# Patient Record
Sex: FEMALE | Race: WHITE | Employment: UNEMPLOYED | URBAN - METROPOLITAN AREA
[De-identification: names, ages, dates, MRNs, and addresses within clinical notes are randomized per-mention and may not be internally consistent; named-entity substitution may affect disease eponyms.]

---

## 2017-07-13 PROBLEM — M17.12 PRIMARY OSTEOARTHRITIS OF LEFT KNEE: Status: ACTIVE | Noted: 2017-07-13

## 2017-09-15 PROBLEM — S83.272A COMPLEX TEAR OF LATERAL MENISCUS OF LEFT KNEE AS CURRENT INJURY: Status: ACTIVE | Noted: 2017-09-15

## 2017-09-15 PROBLEM — M65.9 SYNOVITIS: Status: ACTIVE | Noted: 2017-09-15

## 2018-04-19 ENCOUNTER — OFFICE VISIT (OUTPATIENT)
Dept: ORTHOPEDIC SURGERY | Age: 43
End: 2018-04-19
Payer: COMMERCIAL

## 2018-04-19 VITALS — BODY MASS INDEX: 41.44 KG/M2 | WEIGHT: 264 LBS | HEIGHT: 67 IN | TEMPERATURE: 98 F

## 2018-04-19 DIAGNOSIS — M17.0 PRIMARY OSTEOARTHRITIS OF BOTH KNEES: Primary | ICD-10-CM

## 2018-04-19 PROCEDURE — 4004F PT TOBACCO SCREEN RCVD TLK: CPT | Performed by: ORTHOPAEDIC SURGERY

## 2018-04-19 PROCEDURE — G8427 DOCREV CUR MEDS BY ELIG CLIN: HCPCS | Performed by: ORTHOPAEDIC SURGERY

## 2018-04-19 PROCEDURE — G8417 CALC BMI ABV UP PARAM F/U: HCPCS | Performed by: ORTHOPAEDIC SURGERY

## 2018-04-19 PROCEDURE — 99213 OFFICE O/P EST LOW 20 MIN: CPT | Performed by: ORTHOPAEDIC SURGERY

## 2018-05-22 ENCOUNTER — OFFICE VISIT (OUTPATIENT)
Dept: ORTHOPEDIC SURGERY | Age: 43
End: 2018-05-22
Payer: COMMERCIAL

## 2018-05-22 DIAGNOSIS — M17.0 PRIMARY OSTEOARTHRITIS OF BOTH KNEES: Primary | ICD-10-CM

## 2018-05-22 PROCEDURE — 20610 DRAIN/INJ JOINT/BURSA W/O US: CPT | Performed by: ORTHOPAEDIC SURGERY

## 2018-07-09 ENCOUNTER — APPOINTMENT (OUTPATIENT)
Dept: GENERAL RADIOLOGY | Age: 43
End: 2018-07-09
Payer: COMMERCIAL

## 2018-07-09 ENCOUNTER — HOSPITAL ENCOUNTER (EMERGENCY)
Age: 43
Discharge: ANOTHER HOSPITAL | End: 2018-07-09
Attending: EMERGENCY MEDICINE
Payer: COMMERCIAL

## 2018-07-09 ENCOUNTER — APPOINTMENT (OUTPATIENT)
Dept: CT IMAGING | Age: 43
End: 2018-07-09
Payer: COMMERCIAL

## 2018-07-09 ENCOUNTER — HOSPITAL ENCOUNTER (OUTPATIENT)
Age: 43
Discharge: HOME | End: 2018-07-09
Payer: COMMERCIAL

## 2018-07-09 ENCOUNTER — APPOINTMENT (OUTPATIENT)
Dept: MRI IMAGING | Age: 43
DRG: 045 | End: 2018-07-09
Payer: COMMERCIAL

## 2018-07-09 ENCOUNTER — APPOINTMENT (OUTPATIENT)
Dept: CT IMAGING | Age: 43
DRG: 045 | End: 2018-07-09
Payer: COMMERCIAL

## 2018-07-09 ENCOUNTER — HOSPITAL ENCOUNTER (INPATIENT)
Age: 43
LOS: 1 days | Discharge: HOME | DRG: 045 | End: 2018-07-10
Attending: EMERGENCY MEDICINE | Admitting: HOSPITALIST
Payer: COMMERCIAL

## 2018-07-09 VITALS
OXYGEN SATURATION: 97 % | HEART RATE: 75 BPM | WEIGHT: 252.2 LBS | BODY MASS INDEX: 39.5 KG/M2 | TEMPERATURE: 97.9 F | SYSTOLIC BLOOD PRESSURE: 158 MMHG | DIASTOLIC BLOOD PRESSURE: 85 MMHG | RESPIRATION RATE: 14 BRPM

## 2018-07-09 DIAGNOSIS — R07.9 CHEST PAIN, UNSPECIFIED TYPE: Primary | ICD-10-CM

## 2018-07-09 DIAGNOSIS — R53.1 LEFT-SIDED WEAKNESS: ICD-10-CM

## 2018-07-09 DIAGNOSIS — I16.0 HYPERTENSIVE URGENCY: ICD-10-CM

## 2018-07-09 DIAGNOSIS — I63.9 CEREBROVASCULAR ACCIDENT (CVA), UNSPECIFIED MECHANISM (HCC): Primary | ICD-10-CM

## 2018-07-09 DIAGNOSIS — R52 PAIN: ICD-10-CM

## 2018-07-09 DIAGNOSIS — I63.9 CEREBROVASCULAR ACCIDENT (CVA), UNSPECIFIED MECHANISM (HCC): ICD-10-CM

## 2018-07-09 DIAGNOSIS — R07.9 CHEST PAIN, UNSPECIFIED TYPE: ICD-10-CM

## 2018-07-09 PROBLEM — F17.200 TOBACCO DEPENDENCY: Chronic | Status: ACTIVE | Noted: 2018-07-09

## 2018-07-09 PROBLEM — R29.90 STROKE-LIKE SYMPTOMS: Status: ACTIVE | Noted: 2018-07-09

## 2018-07-09 PROBLEM — E66.9 CLASS 2 OBESITY: Chronic | Status: ACTIVE | Noted: 2018-07-09

## 2018-07-09 PROBLEM — F32.A DEPRESSION: Chronic | Status: ACTIVE | Noted: 2018-07-09

## 2018-07-09 LAB
ACETAMINOPHEN LEVEL: <5 MCG/ML (ref 10–30)
ALBUMIN SERPL-MCNC: 3.9 G/DL (ref 3.5–5.2)
ALP BLD-CCNC: 83 U/L (ref 35–104)
ALT SERPL-CCNC: 10 U/L (ref 0–32)
AMPHETAMINE SCREEN, URINE: NOT DETECTED
AMYLASE: 30 U/L (ref 20–100)
ANION GAP SERPL CALCULATED.3IONS-SCNC: 12 MMOL/L (ref 7–16)
APTT: 29.5 SEC (ref 24.5–35.1)
AST SERPL-CCNC: 12 U/L (ref 0–31)
BACTERIA: ABNORMAL /HPF
BARBITURATE SCREEN URINE: NOT DETECTED
BENZODIAZEPINE SCREEN, URINE: NOT DETECTED
BILIRUB SERPL-MCNC: 0.2 MG/DL (ref 0–1.2)
BILIRUBIN DIRECT: <0.2 MG/DL (ref 0–0.3)
BILIRUBIN URINE: NEGATIVE
BILIRUBIN, INDIRECT: NORMAL MG/DL (ref 0–1)
BLOOD, URINE: NEGATIVE
BUN BLDV-MCNC: 7 MG/DL (ref 6–20)
CALCIUM SERPL-MCNC: 9 MG/DL (ref 8.6–10.2)
CANNABINOID SCREEN URINE: NOT DETECTED
CHLORIDE BLD-SCNC: 101 MMOL/L (ref 98–107)
CHOLESTEROL, FASTING: 201 MG/DL (ref 0–199)
CHP ED QC CHECK: YES
CK MB: 1.3 NG/ML (ref 0–4.3)
CLARITY: CLEAR
CO2: 27 MMOL/L (ref 22–29)
COCAINE METABOLITE SCREEN URINE: NOT DETECTED
COLOR: YELLOW
CREAT SERPL-MCNC: 0.7 MG/DL (ref 0.5–1)
D DIMER: <200 NG/ML DDU
EKG ATRIAL RATE: 66 BPM
EKG P AXIS: 43 DEGREES
EKG P-R INTERVAL: 146 MS
EKG Q-T INTERVAL: 412 MS
EKG QRS DURATION: 98 MS
EKG QTC CALCULATION (BAZETT): 431 MS
EKG R AXIS: 31 DEGREES
EKG T AXIS: 22 DEGREES
EKG VENTRICULAR RATE: 66 BPM
EPITHELIAL CELLS, UA: ABNORMAL /HPF
ETHANOL: <10 MG/DL (ref 0–0.08)
GFR AFRICAN AMERICAN: >60
GFR NON-AFRICAN AMERICAN: >60 ML/MIN/1.73
GLUCOSE BLD-MCNC: 83 MG/DL (ref 74–109)
GLUCOSE URINE: NEGATIVE MG/DL
HBA1C MFR BLD: 5.5 % (ref 4–5.6)
HCT VFR BLD CALC: 33.6 % (ref 34–48)
HDLC SERPL-MCNC: 34 MG/DL
HEMOGLOBIN: 10.1 G/DL (ref 11.5–15.5)
INR BLD: 1.1
KETONES, URINE: NEGATIVE MG/DL
LDL CHOLESTEROL CALCULATED: 126 MG/DL (ref 0–99)
LEUKOCYTE ESTERASE, URINE: ABNORMAL
LIPASE: 31 U/L (ref 13–60)
MAGNESIUM: 2.2 MG/DL (ref 1.6–2.6)
MCH RBC QN AUTO: 22 PG (ref 26–35)
MCHC RBC AUTO-ENTMCNC: 30.1 % (ref 32–34.5)
MCV RBC AUTO: 73 FL (ref 80–99.9)
METER GLUCOSE: 87 MG/DL (ref 70–110)
METER GLUCOSE: 93 MG/DL (ref 70–110)
METHADONE SCREEN, URINE: NOT DETECTED
NITRITE, URINE: NEGATIVE
OPIATE SCREEN URINE: NOT DETECTED
PDW BLD-RTO: 18.7 FL (ref 11.5–15)
PH UA: 6 (ref 5–9)
PHENCYCLIDINE SCREEN URINE: NOT DETECTED
PLATELET # BLD: 477 E9/L (ref 130–450)
PMV BLD AUTO: 9.6 FL (ref 7–12)
POTASSIUM SERPL-SCNC: 3.4 MMOL/L (ref 3.5–5)
PREGNANCY TEST URINE, POC: NORMAL
PRO-BNP: 243 PG/ML (ref 0–125)
PROPOXYPHENE SCREEN: NOT DETECTED
PROTEIN UA: NEGATIVE MG/DL
PROTHROMBIN TIME: 12.3 SEC (ref 9.3–12.4)
RBC # BLD: 4.6 E12/L (ref 3.5–5.5)
RBC UA: ABNORMAL /HPF (ref 0–2)
SALICYLATE, SERUM: <0.3 MG/DL (ref 0–30)
SODIUM BLD-SCNC: 140 MMOL/L (ref 132–146)
SPECIFIC GRAVITY UA: <=1.005 (ref 1–1.03)
TOTAL CK: 47 U/L (ref 20–180)
TOTAL PROTEIN: 6.8 G/DL (ref 6.4–8.3)
TRICYCLIC ANTIDEPRESSANTS SCREEN SERUM: NEGATIVE NG/ML
TRIGLYCERIDE, FASTING: 204 MG/DL (ref 0–149)
TROPONIN: <0.01 NG/ML (ref 0–0.03)
TROPONIN: <0.01 NG/ML (ref 0–0.03)
UROBILINOGEN, URINE: 0.2 E.U./DL
VLDLC SERPL CALC-MCNC: 41 MG/DL
WBC # BLD: 9.3 E9/L (ref 4.5–11.5)
WBC UA: ABNORMAL /HPF (ref 0–5)

## 2018-07-09 PROCEDURE — A0426 ALS 1: HCPCS

## 2018-07-09 PROCEDURE — 70498 CT ANGIOGRAPHY NECK: CPT

## 2018-07-09 PROCEDURE — 82150 ASSAY OF AMYLASE: CPT

## 2018-07-09 PROCEDURE — G8988 SELF CARE GOAL STATUS: HCPCS

## 2018-07-09 PROCEDURE — 70450 CT HEAD/BRAIN W/O DYE: CPT

## 2018-07-09 PROCEDURE — 2700000000 HC OXYGEN THERAPY PER DAY

## 2018-07-09 PROCEDURE — 85610 PROTHROMBIN TIME: CPT

## 2018-07-09 PROCEDURE — G8978 MOBILITY CURRENT STATUS: HCPCS

## 2018-07-09 PROCEDURE — 36415 COLL VENOUS BLD VENIPUNCTURE: CPT

## 2018-07-09 PROCEDURE — 82553 CREATINE MB FRACTION: CPT

## 2018-07-09 PROCEDURE — 80048 BASIC METABOLIC PNL TOTAL CA: CPT

## 2018-07-09 PROCEDURE — 85027 COMPLETE CBC AUTOMATED: CPT

## 2018-07-09 PROCEDURE — 97165 OT EVAL LOW COMPLEX 30 MIN: CPT

## 2018-07-09 PROCEDURE — 6370000000 HC RX 637 (ALT 250 FOR IP): Performed by: EMERGENCY MEDICINE

## 2018-07-09 PROCEDURE — 99285 EMERGENCY DEPT VISIT HI MDM: CPT

## 2018-07-09 PROCEDURE — 92523 SPEECH SOUND LANG COMPREHEN: CPT

## 2018-07-09 PROCEDURE — A0425 GROUND MILEAGE: HCPCS

## 2018-07-09 PROCEDURE — 71045 X-RAY EXAM CHEST 1 VIEW: CPT

## 2018-07-09 PROCEDURE — 81001 URINALYSIS AUTO W/SCOPE: CPT

## 2018-07-09 PROCEDURE — 6360000002 HC RX W HCPCS

## 2018-07-09 PROCEDURE — 0042T CT BRAIN PERFUSION: CPT

## 2018-07-09 PROCEDURE — 83880 ASSAY OF NATRIURETIC PEPTIDE: CPT

## 2018-07-09 PROCEDURE — G8989 SELF CARE D/C STATUS: HCPCS

## 2018-07-09 PROCEDURE — G8979 MOBILITY GOAL STATUS: HCPCS

## 2018-07-09 PROCEDURE — 6360000004 HC RX CONTRAST MEDICATION: Performed by: EMERGENCY MEDICINE

## 2018-07-09 PROCEDURE — 80307 DRUG TEST PRSMV CHEM ANLYZR: CPT

## 2018-07-09 PROCEDURE — G0480 DRUG TEST DEF 1-7 CLASSES: HCPCS

## 2018-07-09 PROCEDURE — 93005 ELECTROCARDIOGRAM TRACING: CPT | Performed by: EMERGENCY MEDICINE

## 2018-07-09 PROCEDURE — 84484 ASSAY OF TROPONIN QUANT: CPT

## 2018-07-09 PROCEDURE — 70496 CT ANGIOGRAPHY HEAD: CPT

## 2018-07-09 PROCEDURE — 97161 PT EVAL LOW COMPLEX 20 MIN: CPT

## 2018-07-09 PROCEDURE — 82550 ASSAY OF CK (CPK): CPT

## 2018-07-09 PROCEDURE — 85378 FIBRIN DEGRADE SEMIQUANT: CPT

## 2018-07-09 PROCEDURE — 83690 ASSAY OF LIPASE: CPT

## 2018-07-09 PROCEDURE — 83735 ASSAY OF MAGNESIUM: CPT

## 2018-07-09 PROCEDURE — 82962 GLUCOSE BLOOD TEST: CPT

## 2018-07-09 PROCEDURE — 80076 HEPATIC FUNCTION PANEL: CPT

## 2018-07-09 PROCEDURE — 83036 HEMOGLOBIN GLYCOSYLATED A1C: CPT

## 2018-07-09 PROCEDURE — 6370000000 HC RX 637 (ALT 250 FOR IP): Performed by: HOSPITALIST

## 2018-07-09 PROCEDURE — 2580000003 HC RX 258: Performed by: HOSPITALIST

## 2018-07-09 PROCEDURE — 80061 LIPID PANEL: CPT

## 2018-07-09 PROCEDURE — 85730 THROMBOPLASTIN TIME PARTIAL: CPT

## 2018-07-09 PROCEDURE — 2060000000 HC ICU INTERMEDIATE R&B

## 2018-07-09 PROCEDURE — G8987 SELF CARE CURRENT STATUS: HCPCS

## 2018-07-09 RX ORDER — HYDRALAZINE HYDROCHLORIDE 20 MG/ML
INJECTION INTRAMUSCULAR; INTRAVENOUS
Status: COMPLETED
Start: 2018-07-09 | End: 2018-07-09

## 2018-07-09 RX ORDER — SODIUM CHLORIDE 0.9 % (FLUSH) 0.9 %
10 SYRINGE (ML) INJECTION PRN
Status: DISCONTINUED | OUTPATIENT
Start: 2018-07-09 | End: 2018-07-10 | Stop reason: HOSPADM

## 2018-07-09 RX ORDER — SERTRALINE HYDROCHLORIDE 100 MG/1
100 TABLET, FILM COATED ORAL 2 TIMES DAILY
Status: DISCONTINUED | OUTPATIENT
Start: 2018-07-09 | End: 2018-07-10 | Stop reason: HOSPADM

## 2018-07-09 RX ORDER — ARIPIPRAZOLE 15 MG/1
15 TABLET ORAL DAILY
Status: DISCONTINUED | OUTPATIENT
Start: 2018-07-09 | End: 2018-07-10 | Stop reason: HOSPADM

## 2018-07-09 RX ORDER — HYDROXYZINE PAMOATE 25 MG/1
25 CAPSULE ORAL 3 TIMES DAILY
Status: DISCONTINUED | OUTPATIENT
Start: 2018-07-09 | End: 2018-07-10 | Stop reason: HOSPADM

## 2018-07-09 RX ORDER — ATORVASTATIN CALCIUM 40 MG/1
80 TABLET, FILM COATED ORAL NIGHTLY
Status: DISCONTINUED | OUTPATIENT
Start: 2018-07-09 | End: 2018-07-10 | Stop reason: HOSPADM

## 2018-07-09 RX ORDER — ASPIRIN 81 MG/1
324 TABLET, CHEWABLE ORAL ONCE
Status: COMPLETED | OUTPATIENT
Start: 2018-07-09 | End: 2018-07-09

## 2018-07-09 RX ORDER — LORAZEPAM 1 MG/1
0.5 TABLET ORAL NIGHTLY
Status: DISCONTINUED | OUTPATIENT
Start: 2018-07-09 | End: 2018-07-10 | Stop reason: HOSPADM

## 2018-07-09 RX ORDER — POTASSIUM CHLORIDE 20 MEQ/1
40 TABLET, EXTENDED RELEASE ORAL ONCE
Status: DISCONTINUED | OUTPATIENT
Start: 2018-07-09 | End: 2018-07-09 | Stop reason: HOSPADM

## 2018-07-09 RX ORDER — SODIUM CHLORIDE 0.9 % (FLUSH) 0.9 %
10 SYRINGE (ML) INJECTION EVERY 12 HOURS SCHEDULED
Status: DISCONTINUED | OUTPATIENT
Start: 2018-07-09 | End: 2018-07-10 | Stop reason: HOSPADM

## 2018-07-09 RX ORDER — ONDANSETRON 2 MG/ML
4 INJECTION INTRAMUSCULAR; INTRAVENOUS EVERY 6 HOURS PRN
Status: DISCONTINUED | OUTPATIENT
Start: 2018-07-09 | End: 2018-07-10 | Stop reason: HOSPADM

## 2018-07-09 RX ADMIN — HYDROXYZINE PAMOATE 25 MG: 25 CAPSULE ORAL at 22:13

## 2018-07-09 RX ADMIN — LORAZEPAM 0.5 MG: 1 TABLET ORAL at 22:13

## 2018-07-09 RX ADMIN — IOPAMIDOL 100 ML: 755 INJECTION, SOLUTION INTRAVENOUS at 04:01

## 2018-07-09 RX ADMIN — ATORVASTATIN CALCIUM 80 MG: 40 TABLET, FILM COATED ORAL at 22:13

## 2018-07-09 RX ADMIN — METOPROLOL TARTRATE 25 MG: 25 TABLET ORAL at 22:13

## 2018-07-09 RX ADMIN — NITROGLYCERIN 0.5 INCH: 20 OINTMENT TOPICAL at 04:08

## 2018-07-09 RX ADMIN — HYDROXYZINE PAMOATE 25 MG: 25 CAPSULE ORAL at 14:11

## 2018-07-09 RX ADMIN — Medication 10 ML: at 22:14

## 2018-07-09 RX ADMIN — HYDRALAZINE HYDROCHLORIDE 20 MG: 20 INJECTION INTRAMUSCULAR; INTRAVENOUS at 01:53

## 2018-07-09 RX ADMIN — ASPIRIN 81 MG CHEWABLE TABLET 324 MG: 81 TABLET CHEWABLE at 04:11

## 2018-07-09 RX ADMIN — SERTRALINE 100 MG: 100 TABLET, FILM COATED ORAL at 23:22

## 2018-07-09 ASSESSMENT — PAIN DESCRIPTION - FREQUENCY
FREQUENCY: CONTINUOUS
FREQUENCY: CONTINUOUS

## 2018-07-09 ASSESSMENT — ENCOUNTER SYMPTOMS
TROUBLE SWALLOWING: 0
VOMITING: 0
NAUSEA: 0

## 2018-07-09 ASSESSMENT — PAIN SCALES - GENERAL
PAINLEVEL_OUTOF10: 0
PAINLEVEL_OUTOF10: 0
PAINLEVEL_OUTOF10: 10
PAINLEVEL_OUTOF10: 0
PAINLEVEL_OUTOF10: 10

## 2018-07-09 ASSESSMENT — PAIN DESCRIPTION - PAIN TYPE: TYPE: ACUTE PAIN

## 2018-07-09 ASSESSMENT — PAIN DESCRIPTION - ONSET
ONSET: SUDDEN
ONSET: SUDDEN

## 2018-07-09 ASSESSMENT — PAIN DESCRIPTION - DESCRIPTORS
DESCRIPTORS: HEAVINESS
DESCRIPTORS: HEAVINESS

## 2018-07-09 ASSESSMENT — PAIN DESCRIPTION - LOCATION
LOCATION: CHEST
LOCATION: CHEST

## 2018-07-09 ASSESSMENT — PAIN DESCRIPTION - ORIENTATION
ORIENTATION: LEFT
ORIENTATION: LEFT

## 2018-07-09 ASSESSMENT — PAIN DESCRIPTION - PROGRESSION: CLINICAL_PROGRESSION: GRADUALLY WORSENING

## 2018-07-09 NOTE — ED NOTES
Bed: 16  Expected date:   Expected time:   Means of arrival:   Comments:  Lizzy [address] [facility]  07/09/18 9145

## 2018-07-09 NOTE — PROGRESS NOTES
SPEECH/LANGUAGE PATHOLOGY  SPEECH/LANGUAGE/COGNITIVE EVALUATION      PATIENT NAME:  Rodolfo Wolf      :  1975      TODAY'S DATE:  2018      SPEECH PATHOLOGY DIAGNOSIS:   Moderate dysarthria    THERAPY RECOMMENDATIONS:   []Speech Pathology intervention is not warranted at this time. [x]Speech Pathology intervention is recommended with emphasis on the following:      To improve oral motor strength and range of motion    To improve articulatory precision               MOTOR SPEECH       Oral Peripheral Examination   []Adequate lingual/labial strength   []Generalized oral weakness   [x]Right labiobuccal weakness   []Left labiobuccal weakness   [x]Right lingual deviation    []Left lingual deviation   []Inadequate velopharyngeal closure  []Oral apraxia      []CNT    Parameters of Speech Production  Respiration:  [x]WFL []SOB []Inadequate for speech production  Articulation:  []WFL [x]Distortions []Anticipatory struggle []CNT  Resonance:  [x]WFL []Hypernasal  []Hyponasal  []Nasal emission []CNT  Quality:   [x]WFL []Hoarse []Harsh []Strained [] Breathiness []CNT  Pitch:    [x]WFL []High []Low []CNT  Intensity: [x]WFL []Loud []Quiet []CNT  Fluency:  [x]Intact []Dysfluent []CNT  Prosody [x]Intact []Monotone []Irregular fluctuation      RECEPTIVE LANGUAGE    Comprehension of Yes/No Questions:   [x]WNL []Incomplete []Latent  []Inconsistent []Perseveration []Cueing  []Unable []CNT    Process  Simple Verbal Commands:   [x]WNL []Incomplete []Latent  []Inconsistent []Perseveration []Cueing  []Unable []CNT  Process Intermediate Verbal Commands:   [x]WNL []Incomplete []Latent  []Inconsistent []Perseveration []Cueing  []Unable []CNT  Process Complex Verbal Commands:   [x]WNL []Incomplete []Latent  []Inconsistent []Perseveration []Cueing  []Unable []CNT    Comprehension of Conversation:     [x]WNL []Incomplete []Latent  []Inconsistent []Perseveration []Cueing  []Unable []CNT       EXPRESSIVE LANGUAGE     Serials: LIST:   Patient Active Problem List   Diagnosis    TIA (transient ischemic attack)    Chest pain    Primary osteoarthritis of left knee    Synovitis    Complex tear of lateral meniscus of left knee as current injury    Cerebrovascular accident (CVA) ([facility] [address]    Stroke-like symptoms         Grey Ruano, Speech Therapy Student   7/9/2018

## 2018-07-09 NOTE — PROGRESS NOTES
educated on safety with mobility, transfers, gait. Patient response to education:   Pt verbalized understanding Pt demonstrated skill Pt requires further education in this area   Yes  Yes with verbal cues Reinforcement     Pts/ family goals   1. To go home. Patient and or family understand(s) diagnosis, prognosis, and plan of care. Yes     PLAN  PT orders will be discontinued as patient with no acute PT needs. Will place on Gait Team services to encourage out of room ambulation. Thank you for the opportunity to assist in the care of this patient.       Time in: 1138  Time out: 482 eLa Carrillo PT, DPT   License number:  [id number]

## 2018-07-09 NOTE — ED PROVIDER NOTES
strength. No cranial nerve deficit or sensory deficit. Coordination and gait normal. GCS eye subscore is 4. GCS verbal subscore is 5. GCS motor subscore is 6. Skin: Skin is warm and dry. No abrasion and no rash noted. Nursing note and vitals reviewed. Procedures    MDM      NIH Stroke Scale at time of initial evaluation:  1A: Level of Consciousness 0 - alert; keenly responsive   1B: Ask Month and Age 0 - answers both questions correctly   1C:  Tell Patient To Open and Close Eyes, then Hand  Squeeze 0 - performs both tasks correctly   2: Test Horizontal Extraocular Movements 0 - normal   3: Test Visual Fields 0 - no visual loss   4: Test Facial Palsy 0 - normal symmetric movement   5A: Test Left Arm Motor Drift 2 - some effort against gravity, limb cannot get to or maintain (if cued) 90 (or 45) degrees, drifts down to bed, but has some effort against gravity    5B: Test Right Arm Motor Drift 0 - no drift, limb holds 90 (or 45) degrees for full 10 seconds   6A: Test Left Leg Motor Drift 2 - some effort against gravity; leg falls to bed by 5 seconds but has some effort against gravity   6B: Test Right Leg Motor Drift 0 - no drift; leg holds 30 degree position for full 5 seconds   7: Test Limb Ataxia   (FNF/Heel-Shin) 0 - absent   8: Test Sensation 1 - mild to moderate sensory loss; patient feels pinprick is less sharp or is dull on the affected side; there is a loss of superficial pain with pinprick but patient is aware of being touched    9: Test Language/Aphasia 0 - no aphasia, normal   10: Test Dysarthria 1 - mild to moderate, patient slurs at least some words and at worst, can be understood with some difficulty   11: Test Extinction/Inattention 0 - no abnormality   Total 6       Acute CVA Core Measures:      - t-PA Eligibility: IV t-PA was considered and not given due to violations in inclusion criteria including stroke onset was greater than 3 hours prior to presentation   - The case has been discussed Value Ref Range    Troponin <0.01 0.00 - 0.03 ng/mL   CK MB   Result Value Ref Range    CK-MB 1.3 0.0 - 4.3 ng/mL   CK   Result Value Ref Range    Total CK 47 20 - 180 U/L   Hepatic Function Panel   Result Value Ref Range    Total Protein 6.8 6.4 - 8.3 g/dL    Alb 3.9 3.5 - 5.2 g/dL    Alkaline Phosphatase 83 35 - 104 U/L    ALT 10 0 - 32 U/L    AST 12 0 - 31 U/L    Total Bilirubin 0.2 0.0 - 1.2 mg/dL    Bilirubin, Direct <0.2 0.0 - 0.3 mg/dL    Bilirubin, Indirect see below 0.0 - 1.0 mg/dL   Lipase   Result Value Ref Range    Lipase 31 13 - 60 U/L   Amylase   Result Value Ref Range    Amylase 30 20 - 100 U/L   Magnesium   Result Value Ref Range    Magnesium 2.2 1.6 - 2.6 mg/dL   Brain Natriuretic Peptide   Result Value Ref Range    Pro- (H) 0 - 125 pg/mL   Protime-INR   Result Value Ref Range    Protime 12.3 9.3 - 12.4 sec    INR 1.1    APTT   Result Value Ref Range    aPTT 29.5 24.5 - 35.1 sec   Urinalysis   Result Value Ref Range    Color, UA Yellow Straw/Yellow    Clarity, UA Clear Clear    Glucose, Ur Negative Negative mg/dL    Bilirubin Urine Negative Negative    Ketones, Urine Negative Negative mg/dL    Specific Gravity, UA <=1.005 1.005 - 1.030    Blood, Urine Negative Negative    pH, UA 6.0 5.0 - 9.0    Protein, UA Negative Negative mg/dL    Urobilinogen, Urine 0.2 <2.0 E.U./dL    Nitrite, Urine Negative Negative    Leukocyte Esterase, Urine SMALL (A) Negative   Serum Drug Screen   Result Value Ref Range    Ethanol Lvl <10 mg/dL    Acetaminophen Level <5.0 (L) 10.0 - 68.2 mcg/mL    Salicylate, Serum <8.6 0.0 - 30.0 mg/dL   Urine Drug Screen   Result Value Ref Range    Amphetamine Screen, Urine NOT DETECTED Negative <1000 ng/mL    Barbiturate Screen, Ur NOT DETECTED Negative < 200 ng/mL    Benzodiazepine Screen, Urine NOT DETECTED Negative < 200 ng/mL    Cannabinoid Scrn, Ur NOT DETECTED Negative < 50ng/mL    COCAINE METABOLITE SCREEN URINE NOT DETECTED Negative < 300 ng/mL    Opiate Scrn, Ur NOT

## 2018-07-09 NOTE — ED PROVIDER NOTES
Department of Emergency Medicine   ED  Provider Note  Admit Date/RoomTime: 7/9/2018  3:23 AM  ED Room: 16/16 7/9/18  4:47 AM      HISTORY OF PRESENT ILLNESS:  (Nurses Notes Reviewed)    Chief Complaint:   Cerebrovascular Accident (LKW 2045, transfer from [facility]. Left side weakness, tongue deviation and slight slurred speech.)      Source of history provided by:  patient, EMS personnel and transferring ED physician. History/Exam Limitations: none. Patient is a 46-year-old female presenting to the ED transferred from an outside facility for stroke like symptoms. Patient last known well was 845 last night. Patient states she suddenly had left-sided weakness deviation of the tongue slightly slurred speech. Patient was evaluated at the ED at [facility] in [place]. Patient has strokelike symptoms, ED physician at that facility spoke with neurology who advised patient is no longer a TPA candidate though still no one for IR they recommended CT perfusion in CTA performed. That facility was unable to perform those tests in the ED. Patient was then transferred as an ED  To  ED transfer to our facility. Patient states he currently has chest pain states it is left-sided substernal. Patient has risk factors for cardiovascular disease. She is not taking any medication was not treated at the other facility for chest pain. Patient has a history of TIAs, hypertension, hyperlipidemia, history of blood clots, factor VIII deficiency. Code Status on file: Full Code. NIH Stroke Scale at time of initial evaluation: 7  1A: Level of Consciousness 0 - alert; keenly responsive   1B: Ask Month and Age 0 - answers both questions correctly   1C:  Tell Patient To Open and Close Eyes, then Hand  Squeeze 0 - performs both tasks correctly   2: Test Horizontal Extraocular Movements 0 - normal   3: Test Visual Fields 0 - no visual loss   4: Test Facial Palsy 0 - normal symmetric movement   5A: Test Left Arm Motor Drift 3 - no effort against gravity, limb falls   5B: Test Right Arm Motor Drift 0 - no drift, limb holds 90 (or 45) degrees for full 10 seconds   6A: Test Left Leg Motor Drift 3 - no effort against gravity; leg falls to bed immediately   6B: Test Right Leg Motor Drift 0 - no drift; leg holds 30 degree position for full 5 seconds   7: Test Limb Ataxia   (FNF/Heel-Shin) 0 - absent   8: Test Sensation 0 - normal; no sensory loss   9: Test Language/Aphasia 0 - no aphasia, normal   10: Test Dysarthria 1 - mild to moderate, patient slurs at least some words and at worst, can be understood with some difficulty   11: Test Extinction/Inattention 0 - no abnormality   Total 7             Past Medical History:  has a past medical history of Depression; Factor VIII deficiency ([facility] [address] Hx of blood clots; Hx of cardiovascular stress test; Hyperlipidemia; Hypertension; and TIA (transient ischemic attack). Past Surgical History:  has a past surgical history that includes Appendectomy; Cholecystectomy; Tonsillectomy; and Knee arthroscopy (Left, 09/15/2017). Social History:  reports that she has been smoking Cigarettes. She has a 22.00 pack-year smoking history. She has never used smokeless tobacco. She reports that she does not drink alcohol or use drugs. Family History: family history is not on file. The patients home medications have been reviewed. Allergies: Amlodipine; Azithromycin; Codeine; Corn oil; Erythromycin; Pcn [penicillins]; and Sulfa antibiotics      Review of Systems:   Pertinent positives and negatives are stated within HPI, all other systems reviewed and are negative.          ---------------------------------------------------PHYSICAL EXAM--------------------------------------    Constitutional/General: Alert and oriented x3, well appearing, non toxic in NAD  Head: Normocephalic and atraumatic  Eyes: PERRL, EOMI  Mouth: Oropharynx clear, handling secretions, no trismus.  No facial droop  Neck: 100 mL of 370 iso administered intravenously. COMPARISON:   CT CTA HEAD 2013-05-25 19:02      FINDINGS:   Right internal carotid artery:  No acute findings. Intracranial segment    is    patent with no significant stenosis. No aneurysm. Right anterior cerebral artery:  Normal.  No occlusion or significant    stenosis. No aneurysm. Right middle cerebral artery:  Normal.  No occlusion or significant    stenosis. No aneurysm. Right posterior cerebral artery:  Normal.  No occlusion or significant    stenosis. No aneurysm. Right vertebral artery:  Normal as visualized. Left internal carotid artery:  No acute findings. Intracranial segment is       patent with no significant stenosis. No aneurysm. Left anterior cerebral artery:  Normal.  No occlusion or significant    stenosis. No aneurysm. Left middle cerebral artery:  Normal.  No occlusion or significant    stenosis. No aneurysm. Left posterior cerebral artery:  Normal.  No occlusion or significant    stenosis. No aneurysm. Left vertebral artery:  Normal as visualized. Basilar artery:  Normal.  No occlusion or significant stenosis. No    aneurysm. IMPRESSION:           Normal head CTA. THIS REPORT CONTAINS FINDINGS THAT MAY BE CRITICAL TO PATIENT CARE. The    findings were verbally communicated via telephone conference with Dr. Nic Rodas    at 4:43 AM EDT on 7/9/2018. The findings were acknowledged and understood. This addendum has been electronically signed by Joann Tian MD.      Final      Normal head CTA. This report has been electronically signed by Joann Tian MD.      CTA Neck W Contrast   Final Result   Addendum 1 of 1   EXAM:   CT Angiography Neck With Intravenous Contrast      CLINICAL HISTORY:   41years old, female; Pain;  Additional info: Stroke      TECHNIQUE:   Axial computed tomographic angiography images of the neck with intravenous       contrast using CT angiography cerebral perfusion protocol. Post-processing parametric maps were created    and    reviewed. These include cerebral blood flow, cerebral blood volume and    mean    transit time. All CT scans at this facility use at least one of these    dose    optimization techniques: automated exposure control; mA and/or kV    adjustment    per patient size (includes targeted exams where dose is matched to    clinical    indication); or iterative reconstruction. MIP reconstructed images were created and reviewed. CONTRAST:   100 mL of 370 iso administered intravenously. COMPARISON:   CT HEAD WO CONTRAST 2018-07-09 01:39      FINDINGS:   Cerebral blood flow:  Normal.  Symmetric with no defects. Cerebral blood volume:  Normal.  Symmetric. Mean transit time:  Normal.  No delayed perfusion. Brain:  Normal.  No hemorrhage. No edema. Normal enhancement. Ventricles:  Normal.   Bones/joints:  No acute fracture. Soft tissues:  Normal.   Sinuses:  Normal.   Mastoid air cells:  Normal as visualized. No mastoid effusion. IMPRESSION:           Normal brain perfusion CT. THIS REPORT CONTAINS FINDINGS THAT MAY BE CRITICAL TO PATIENT CARE. The    findings were verbally communicated via telephone conference with Dr. Naima Mansfield    at 4:40 AM EDT on 7/9/2018. The findings were acknowledged and understood. This addendum has been electronically signed by Blaire Mccallum MD.      Final      Normal brain perfusion CT.       This report has been electronically signed by Blaire Mccallum MD.      [address]    (Results Pending)       EKG Interpretation  Interpreted by emergency department physician, Dr. Shakira Funez: normal sinus   Rate: normal  Axis: normal  Conduction: normal  ST Segments: no acute change  T Waves: no acute change    Clinical Impression: no acute changes  Comparison to Old EKG  No change          ------------------------- NURSING NOTES AND VITALS REVIEWED radiologist. Patient also evaluated from chest pain standpoint. Patient will be admitted for strokelike symptoms and chest pain. Re-Evaluations:             4:00 Re-evaluation. Patients symptoms are improving. Able to significantly move the left upper extremity left lower extremity is limited in motion the patient is able to wiggle her toes and move her foot lift up slightly. Patient is able to move her left upper extremity without issue. No change in speech. This patient's ED course included: a personal history and physicial examination, cardiac monitoring, continuous pulse oximetry and a personal history and physicial eaxmination    This patient has improved during their ED course. Consultations:             4:00 Dr Merline Lips spoke with Dr. Nasreen Perez from the hospitalist service. He agreed to admit patient      Counseling: The emergency provider has spoken with the patient and discussed todays results, in addition to providing specific details for the plan of care and counseling regarding the diagnosis and prognosis. Questions are answered at this time and they are agreeable with the plan.       --------------------------------- IMPRESSION AND DISPOSITION ---------------------------------    IMPRESSION  1. Cerebrovascular accident (CVA), unspecified mechanism ([address]    2. Chest pain, unspecified type    3. Pain        DISPOSITION  Disposition: Admit to telemetry  Patient condition is stable           Kirby Huerta DO  Resident  07/09/18 0501    ATTENDING PROVIDER ATTESTATION:     I have personally performed and/or participated in the history, exam, medical decision making, and procedures and agree with all pertinent clinical information. I have also reviewed and agree with the past medical, family and social history unless otherwise noted. I have discussed this patient in detail with the resident, and provided the instruction and education regarding stroke symptoms.     My findings/Plan: Patient presents because of left-sided weakness. Patient also reported chest pain that time of her weakness. Patient was seen at [facility] facility. She reports her symptoms started at 9 pm. Patient had lab work as well as EKG done at [facility] as well as CT head. Patient was sent here for further imaging and possible interventional radiology. Patient reports pain is left sided nonradiating. She reports numbness in her left arm though. She does report weakness in her left upper and lower extremity. Patient reporting no abdominal pain there is no vomiting there is no headache. Patient awake alert oriented ×3. Heart and lung exam within normal limits. Patient has noted weakness to left upper and lower extremity. Patient has no facial droop. Labs reviewed from [facility] as well as EKG was done. Patient medicated. Patient had CTA of the head as well as neck and perfusion there are all negative. Patient reexamination is much improved. Patient now moving left upper and lower extremity able to raise leg up off the bed. Patient speech is improved as well. I did speak to internal medicine. Patient will be admitted. Neurology was already consulted from [facility]. With patient improving here in the emergency department and CTA of head and neck and perfusion negative . Not feel interventional radiology would be needed. Patient made aware findings and admission. Please note that the withdrawal or failure to initiate urgent interventions for this patient would likely result in a life threatening deterioration or permanent disability. Accordingly this patient received 30 minutes of critical care time, excluding separately billable procedures.            Abebe Adkins MD  07/09/18 0909 Kuldeep ENCISO MD  07/09/18 5467

## 2018-07-09 NOTE — PROGRESS NOTES
Net             -580 ml       Labs:   Recent Labs      07/09/18   0135   WBC  9.3   HGB  10.1*   HCT  33.6*   PLT  477*       Recent Labs      07/09/18   0135   NA  140   K  3.4*   CL  101   CO2  27   BUN  7   CREATININE  0.7   CALCIUM  9.0       Recent Labs      07/09/18   0135   PROT  6.8   ALKPHOS  83   ALT  10   AST  12   BILITOT  0.2   AMYLASE  30   LIPASE  31       Recent Labs      07/09/18   0135   INR  1.1       Recent Labs      07/09/18   0135  07/09/18   0811   CKTOTAL  47   --    TROPONINI  <0.01  <0.01       Chronic labs:  Lab Results   Component Value Date    CHOL 206 (H) 10/06/2015    TRIG 201 (H) 10/06/2015    HDL 34 07/09/2018    LDLCALC 126 (H) 07/09/2018    TSH 1.140 10/06/2015    INR 1.1 07/09/2018    LABA1C 5.5 07/09/2018       Radiology:  Imaging studies reviewed today. ASSESSMENT:    Principal Problem:    Stroke-like symptoms  Active Problems:    Chest pain    Primary osteoarthritis of left knee    Depression    Tobacco dependency    Class 2 obesity  Resolved Problems:    * No resolved hospital problems. *       Body mass index is 38.69 kg/m². PLAN:  CTA head and neck, CT brain perfusion reviewed. Normal.  Echo and MRI brain pending  Cardio and neurology consultations placed by Dr. Kylah Flores  ? Anxiety depression related  Continue Vistaril, Abilify, Zoloft  Near perfect PTOT evaluation scores  Disposition pending further workup. Diet: DIET CARDIAC;  Code Status: Full Code     PT/OT Eval Status: [] Ordered [x] Evaluation noted [] Not applicable  DVT Prophylaxis: []Lovenox []Heparin []PCD [] [address] [x]Encouraged ambulation []Not applicable  Recommended disposition at discharge:  [x]Home [] Home with [facility] [] SNF/[facility] [] Acute Rehab [] LTAC []Other:    +++++++++++++++++++++++++++++++++++++++++++++++++  Clover Arnett MD, Hospitalist  +++++++++++++++++++++++++++++++++++++++++++++++++  NOTE: This report was transcribed using voice recognition software.  Every effort was made to ensure

## 2018-07-09 NOTE — ED NOTES
The patient was nonchalant about her symptoms, she said her symptoms started around 2100, she felt as if she was having a panic attack and thought the symptoms would go away.       Jay Cohen RN  07/09/18 5880

## 2018-07-09 NOTE — H&P
aneurysm. Right middle cerebral artery:  Normal.  No occlusion or significant    stenosis. No aneurysm. Right posterior cerebral artery:  Normal.  No occlusion or significant    stenosis. No aneurysm. Right vertebral artery:  Normal as visualized. Left internal carotid artery:  No acute findings. Intracranial segment is       patent with no significant stenosis. No aneurysm. Left anterior cerebral artery:  Normal.  No occlusion or significant    stenosis. No aneurysm. Left middle cerebral artery:  Normal.  No occlusion or significant    stenosis. No aneurysm. Left posterior cerebral artery:  Normal.  No occlusion or significant    stenosis. No aneurysm. Left vertebral artery:  Normal as visualized. Basilar artery:  Normal.  No occlusion or significant stenosis. No    aneurysm. IMPRESSION:           Normal head CTA. THIS REPORT CONTAINS FINDINGS THAT MAY BE CRITICAL TO PATIENT CARE. The    findings were verbally communicated via telephone conference with Dr. Fercho Mckeon    at 4:43 AM EDT on 7/9/2018. The findings were acknowledged and understood. This addendum has been electronically signed by Ricardo Muñoz MD.      Final      Normal head CTA. This report has been electronically signed by Ricardo Muñoz MD.      CTA Neck W Contrast   Final Result   Addendum 1 of 1   EXAM:   CT Angiography Neck With Intravenous Contrast      CLINICAL HISTORY:   41years old, female; Pain; Additional info: Stroke      TECHNIQUE:   Axial computed tomographic angiography images of the neck with intravenous       contrast using CT angiography protocol. All CT scans at this facility use    at    least one of these dose optimization techniques: automated exposure    control; mA    and/or kV adjustment per patient size (includes targeted exams where dose    is    matched to clinical indication); or iterative reconstruction. MIP reconstructed images were created and reviewed. CONTRAST:   100 mL of 370 iso administered intravenously. COMPARISON:   CT CTA HEAD 2013-05-25 19:02      FINDINGS:      VASCULATURE:   Right common carotid artery:  Normal.  No significant stenosis. No    dissection    or occlusion. Right internal carotid artery:  Normal.  Extracranial segment is patent    with no    significant stenosis. No dissection or occlusion. Right external carotid artery:  Normal.  No occlusion. Right vertebral artery:  Normal.  No significant stenosis. No dissection    or    occlusion. Left common carotid artery:  Normal.  No significant stenosis. No    dissection    or occlusion. Left internal carotid artery:  Normal.  Extracranial segment is patent    with no    significant stenosis. No dissection or occlusion. Left external carotid artery:  Normal.  No occlusion. Left vertebral artery:  Normal.  No significant stenosis. No dissection    or    occlusion. NECK:   Bones/joints:  No acute fracture. No dislocation. Soft tissues:  Normal as visualized. No mass. CAROTID STENOSIS REFERENCE USING NASCET CRITERIA:   % ICA stenosis = (1 - narrowest ICA diameter/diameter of distal cervical    ICA) x    100. Mild - <50% stenosis. Moderate - 50-69% stenosis. Severe - 70-94% stenosis. Near occlusion - 95-99% stenosis. Occluded - 100% stenosis. IMPRESSION:           Normal neck CTA. THIS REPORT CONTAINS FINDINGS THAT MAY BE CRITICAL TO PATIENT CARE. The    findings were verbally communicated via telephone conference with Dr. Elizabeth Jessica    at 4:42 AM EDT on 7/9/2018. The findings were acknowledged and understood. This addendum has been electronically signed by Ben Morris MD.      Final      Normal neck CTA.       This report has been electronically signed by Ben Morris MD.      [address] [place] [address]   Final Result   Addendum 1 of 1   EXAM:   CT Head Without Intravenous Contrast      CLINICAL HISTORY:   41years old, female; Pain; Other: Slurred speech; Additional info: Pain. Slurred speech      TECHNIQUE:   Axial computed tomography images of the head/brain without intravenous    contrast.  All CT scans at this facility use at least one of these dose    optimization techniques: automated exposure control; mA and/or kV    adjustment    per patient size (includes targeted exams where dose is matched to    clinical    indication); or iterative reconstruction. Coronal and sagittal reformatted images were created and reviewed. COMPARISON:   CT HEAD WO CONTRAST 2018-07-09 01:39      FINDINGS:   Brain:  Normal.   Ventricles:  Normal.   Bones/joints:  Normal.  No acute fracture. Soft tissues:  Normal.   Sinuses:  Normal.   Mastoid air cells:  Normal as visualized. No mastoid effusion. IMPRESSION:           Normal head/brain CT. THIS REPORT CONTAINS FINDINGS THAT MAY BE CRITICAL TO PATIENT CARE. The    findings were verbally communicated via telephone conference with Dr. Deb Antony    at 4:42 AM EDT on 7/9/2018. The findings were acknowledged and understood. This addendum has been electronically signed by Suzie Snell MD.      Final      Normal head/brain CT. This report has been electronically signed by Suzie Snell MD.      CT Brain Perfusion   Final Result   Addendum 1 of 1   EXAM:   CT Brain Perfusion With Intravenous Contrast      CLINICAL HISTORY:   41years old, female; Pain; Headache; Additional info: Focal neuro    deficit,    new, fixed or worsening, less than 3 hours      TECHNIQUE:   Axial computed tomography images of the brain with intravenous contrast    using    cerebral perfusion protocol. Post-processing parametric maps were created    and    reviewed. These include cerebral blood flow, cerebral blood volume and    mean    transit time.   All CT scans at this facility use at least one of these    dose    optimization techniques: automated exposure control; mA and/or kV    adjustment    per patient size (includes targeted exams where dose is matched to    clinical    indication); or iterative reconstruction. MIP reconstructed images were created and reviewed. CONTRAST:   100 mL of 370 iso administered intravenously. COMPARISON:   CT HEAD WO CONTRAST 2018-07-09 01:39      FINDINGS:   Cerebral blood flow:  Normal.  Symmetric with no defects. Cerebral blood volume:  Normal.  Symmetric. Mean transit time:  Normal.  No delayed perfusion. Brain:  Normal.  No hemorrhage. No edema. Normal enhancement. Ventricles:  Normal.   Bones/joints:  No acute fracture. Soft tissues:  Normal.   Sinuses:  Normal.   Mastoid air cells:  Normal as visualized. No mastoid effusion. IMPRESSION:           Normal brain perfusion CT. THIS REPORT CONTAINS FINDINGS THAT MAY BE CRITICAL TO PATIENT CARE. The    findings were verbally communicated via telephone conference with Dr. Nic Rodas    at 4:40 AM EDT on 7/9/2018. The findings were acknowledged and understood. This addendum has been electronically signed by Joann Tian MD.      Final      Normal brain perfusion CT.       This report has been electronically signed by Joann Tian MD.      Flori Soriano [facility]    (Results Pending)       ASSESSMENT:    Active Hospital Problems    Diagnosis Date Noted    Stroke-like symptoms [R29.90] 07/09/2018       40 yo obese female hx TIA's, depression, factor VIII deficiency hx blood clots, hlp, htn came to OSH for chest pain, sob/left arm weakness/numbness/ and left leg weakness, and slurred speech, since 9 pm, denies pleuretic chest pain on multiple asking, has 10/10 chest pain, radiating to left arm, elephant sitting , denies fever chills, no n/v/c, no ent c/o, no cold , no wheezing, taking her meds, smokes, at OSH [facility] 6, ct head, cta neck/head/ct perfusion all negative, pt evaluated by Alexsander Javed 36 and no TPA needed, further stroke w/u advised  She denies CHU, exertional chest pain, palpitations,

## 2018-07-09 NOTE — ED NOTES
Telestroke consult placed, awaiting call back     [address]  07/09/18 1928

## 2018-07-09 NOTE — CARE COORDINATION
CM attempted to meet with patient at bedside, however, she is currently resting with her eyes closed; echo and mri brain still pending; pt/ot evals reviewed; no needs anticipated at time of discharge; will dc home when medically stable

## 2018-07-10 VITALS
HEART RATE: 66 BPM | WEIGHT: 247 LBS | OXYGEN SATURATION: 98 % | BODY MASS INDEX: 38.77 KG/M2 | RESPIRATION RATE: 18 BRPM | HEIGHT: 67 IN | TEMPERATURE: 97.7 F | SYSTOLIC BLOOD PRESSURE: 130 MMHG | DIASTOLIC BLOOD PRESSURE: 70 MMHG

## 2018-07-10 PROBLEM — R29.90 STROKE-LIKE SYMPTOMS: Status: RESOLVED | Noted: 2018-07-09 | Resolved: 2018-07-10

## 2018-07-10 LAB
ANION GAP SERPL CALCULATED.3IONS-SCNC: 10 MMOL/L (ref 7–16)
BUN BLDV-MCNC: 9 MG/DL (ref 6–20)
CALCIUM SERPL-MCNC: 8.6 MG/DL (ref 8.6–10.2)
CHLORIDE BLD-SCNC: 104 MMOL/L (ref 98–107)
CO2: 25 MMOL/L (ref 22–29)
CREAT SERPL-MCNC: 0.6 MG/DL (ref 0.5–1)
GFR AFRICAN AMERICAN: >60
GFR NON-AFRICAN AMERICAN: >60 ML/MIN/1.73
GLUCOSE BLD-MCNC: 96 MG/DL (ref 74–109)
HCT VFR BLD CALC: 32.5 % (ref 34–48)
HEMOGLOBIN: 9.4 G/DL (ref 11.5–15.5)
LV EF: 60 %
LVEF MODALITY: NORMAL
MAGNESIUM: 2.1 MG/DL (ref 1.6–2.6)
MCH RBC QN AUTO: 21.1 PG (ref 26–35)
MCHC RBC AUTO-ENTMCNC: 28.9 % (ref 32–34.5)
MCV RBC AUTO: 73 FL (ref 80–99.9)
PDW BLD-RTO: 19.1 FL (ref 11.5–15)
PLATELET # BLD: 424 E9/L (ref 130–450)
PMV BLD AUTO: 9.5 FL (ref 7–12)
POTASSIUM REFLEX MAGNESIUM: 4.1 MMOL/L (ref 3.5–5)
RBC # BLD: 4.45 E12/L (ref 3.5–5.5)
SODIUM BLD-SCNC: 139 MMOL/L (ref 132–146)
WBC # BLD: 7.5 E9/L (ref 4.5–11.5)

## 2018-07-10 PROCEDURE — 83735 ASSAY OF MAGNESIUM: CPT

## 2018-07-10 PROCEDURE — 36415 COLL VENOUS BLD VENIPUNCTURE: CPT

## 2018-07-10 PROCEDURE — 80048 BASIC METABOLIC PNL TOTAL CA: CPT

## 2018-07-10 PROCEDURE — 6370000000 HC RX 637 (ALT 250 FOR IP): Performed by: HOSPITALIST

## 2018-07-10 PROCEDURE — 85027 COMPLETE CBC AUTOMATED: CPT

## 2018-07-10 PROCEDURE — 2580000003 HC RX 258: Performed by: HOSPITALIST

## 2018-07-10 PROCEDURE — 2700000000 HC OXYGEN THERAPY PER DAY

## 2018-07-10 PROCEDURE — 6360000002 HC RX W HCPCS: Performed by: HOSPITALIST

## 2018-07-10 PROCEDURE — 93306 TTE W/DOPPLER COMPLETE: CPT

## 2018-07-10 RX ORDER — ASPIRIN 81 MG/1
81 TABLET ORAL DAILY
Qty: 30 TABLET | Refills: 3 | Status: SHIPPED | OUTPATIENT
Start: 2018-07-10 | End: 2020-07-14 | Stop reason: ALTCHOICE

## 2018-07-10 RX ORDER — ATORVASTATIN CALCIUM 80 MG/1
80 TABLET, FILM COATED ORAL NIGHTLY
Qty: 30 TABLET | Refills: 3 | Status: SHIPPED | OUTPATIENT
Start: 2018-07-10 | End: 2020-07-14 | Stop reason: ALTCHOICE

## 2018-07-10 RX ADMIN — Medication 10 ML: at 08:58

## 2018-07-10 RX ADMIN — SERTRALINE 100 MG: 100 TABLET, FILM COATED ORAL at 08:58

## 2018-07-10 RX ADMIN — HYDROXYZINE PAMOATE 25 MG: 25 CAPSULE ORAL at 14:04

## 2018-07-10 RX ADMIN — Medication 10 ML: at 09:01

## 2018-07-10 RX ADMIN — ENOXAPARIN SODIUM 40 MG: 100 INJECTION SUBCUTANEOUS at 09:00

## 2018-07-10 RX ADMIN — HYDROXYZINE PAMOATE 25 MG: 25 CAPSULE ORAL at 08:57

## 2018-07-10 RX ADMIN — METOPROLOL TARTRATE 25 MG: 25 TABLET ORAL at 08:58

## 2018-07-10 RX ADMIN — ARIPIPRAZOLE 15 MG: 15 TABLET ORAL at 08:57

## 2018-07-10 ASSESSMENT — PAIN SCALES - GENERAL
PAINLEVEL_OUTOF10: 0
PAINLEVEL_OUTOF10: 0

## 2018-07-10 NOTE — PROGRESS NOTES
Patient requested to see Dr. Carol Chambers this admission, will defer consulted to him at this time. Thank you.

## 2018-07-10 NOTE — PROGRESS NOTES
Instructed patient that she was not allowed to leave room to smoke and offered to request a nicotine patch.  Patient stated \"I don't want one I need a real smoke\"

## 2018-07-10 NOTE — CONSULTS
or palpitations  No SOB  No vertigo, lightheadedness or loss of consciousness  No falls, tripping or stumbling  No incontinence of bowels or bladder  No itching or bruising appreciated  No numbness, tingling or focal arm/leg weakness    ROS is otherwise negative     Family History:     Family History   Problem Relation Age of Onset    Diabetes Mother     Stroke Mother     Heart Disease Mother     Cancer Father     Heart Disease Father         History of Present Illness:     Patient is a 41year old female who presented to the ED with left sided weakness, tongue deviation and slurred speech. Patient has a history of TIAs, HTN, HLD, and right sided stroke in the past. Patient also has a history of Factor VIII deficiency, for which she was on coumadin but was stopped as the patient states that it was stopped because she was too young to be put on life long anticoagulation. Patient states that yesterday she began having left sided weakness, which lasted for approximately 5-6 hours, and gradually went away. Patient had normal head CTA, perfusion, CTA neck, CTA head. Patient has had similar tongue \"swelling\" on the right side, but states that the swelling had decreased since being in the hospital. She currently feels like she is back to normal. Patient is not on any aspirin or cholesterol medication. Objective:     BP (!) 140/90   Pulse 61   Temp 97.8 °F (36.6 °C) (Temporal)   Resp 16   Ht 5' 7\" (1.702 m)   Wt 247 lb (112 kg)   SpO2 98%   BMI 38.69 kg/m²     General appearance: alert, appears stated age, cooperative and no distress  Head: normocephalic, without obvious abnormality, atraumatic  Eyes: conjunctivae/corneas clear. .  Mouth: moist mucous membranes, Appears to be mild atrophy on the left side compares to the right side of the tongue. Tongue deviates to the right however.      Neck: no adenopathy, no carotid bruit, supple, symmetrical, trachea midline and thyroid not enlarged, symmetric, no Contrast       CLINICAL HISTORY:   41years old, female; Pain; Additional info: Stroke       TECHNIQUE:   Axial computed tomographic angiography images of the neck with intravenous    contrast using CT angiography protocol.  All CT scans at this facility use at    least one of these dose optimization techniques: automated exposure control; mA    and/or kV adjustment per patient size (includes targeted exams where dose is    matched to clinical indication); or iterative reconstruction. MIP reconstructed images were created and reviewed.       CONTRAST:   100 mL of 370 iso administered intravenously.         COMPARISON:   CT CTA HEAD 2013-05-25 19:02       FINDINGS:       VASCULATURE:   Right common carotid artery:  Normal.  No significant stenosis.  No dissection    or occlusion. Right internal carotid artery:  Normal.  Extracranial segment is patent with no    significant stenosis.  No dissection or occlusion. Right external carotid artery:  Normal.  No occlusion. Right vertebral artery:  Normal.  No significant stenosis.  No dissection or    occlusion.       Left common carotid artery:  Normal.  No significant stenosis.  No dissection    or occlusion. Left internal carotid artery:  Normal.  Extracranial segment is patent with no    significant stenosis.  No dissection or occlusion. Left external carotid artery:  Normal.  No occlusion. Left vertebral artery:  Normal.  No significant stenosis.  No dissection or    occlusion.       NECK:   Bones/joints:  No acute fracture.  No dislocation. Soft tissues:  Normal as visualized.  No mass.       CAROTID STENOSIS REFERENCE USING NASCET CRITERIA:   % ICA stenosis = (1 - narrowest ICA diameter/diameter of distal cervical ICA) x    100. Mild - <50% stenosis. Moderate - 50-69% stenosis. Severe - 70-94% stenosis. Near occlusion - 95-99% stenosis. Occluded - 100% stenosis.      EXAM:   CT Angiography Head With Intravenous Contrast       CLINICAL HISTORY: with intravenous contrast using    cerebral perfusion protocol.  Post-processing parametric maps were created and    reviewed.  These include cerebral blood flow, cerebral blood volume and mean    transit time.  All CT scans at this facility use at least one of these dose    optimization techniques: automated exposure control; mA and/or kV adjustment    per patient size (includes targeted exams where dose is matched to clinical    indication); or iterative reconstruction. MIP reconstructed images were created and reviewed.       CONTRAST:   100 mL of 370 iso administered intravenously.         COMPARISON:   CT HEAD WO CONTRAST 2018-07-09 01:39       FINDINGS:   Cerebral blood flow:  Normal.  Symmetric with no defects. Cerebral blood volume:  Normal.  Symmetric. Mean transit time:  Normal.  No delayed perfusion.       Brain:  Normal.  No hemorrhage.  No edema.  Normal enhancement. Ventricles:  Normal.   Bones/joints:  No acute fracture. Soft tissues:  Normal.   Sinuses:  Normal.   Mastoid air cells:  Normal as visualized.  No mastoid effusion. MRI brain: Patient refused    I labs and imaging studies were reviewed with attending. Assessment and Plan   1. Neuro: Left sided weakness, resolved. Tongue deviation to the right with atrophy of the left side. Perhaps swelling to the right side of the tongue since if true atrophy of the left was seen, the tongue would deviate to the left, rather than deviate to the right. At baseline for patient. Patient's scans were unremarkable. Next step would be MRI, however patient refused. Patient not on aspirin or anticholesterol medication. Would consider starting patient on aspirin 81 mg QD as well as Lipitor. Patient feels improved. 2. CV: Echo pending  3. Encourage smoking cessation.         FARHAN Zhang PGY - 2  Neurology Service  9:00 AM  7/10/2018

## 2018-07-10 NOTE — PROGRESS NOTES
Hospital Medicine Progress Note      Date of Admission: 7/9/2018  Chief Complaint:  had concerns including Cerebrovascular Accident (LKW 2045, transfer from [facility]. Left side weakness, tongue deviation and slight slurred speech.). Primary diagnoses: Stroke-like symptoms    Subjective   Patient seen and examined. Patient has no symptoms that she presented with. Patient is chest pain-free. Patient has no shortness of breath or dyspnea. Patient has been reportedly going out for severe smoking without any symptoms. Patient refused MRI brain due to claustrophobia yesterday. Requesting to leave today as patient is clinically improving. Feeling better. Stable overnight. No other overnight issues reported. Exam:    BP (!) 140/90   Pulse 61   Temp 97.8 °F (36.6 °C) (Temporal)   Resp 16   Ht 5' 7\" (1.702 m)   Wt 247 lb (112 kg)   SpO2 98%   BMI 38.69 kg/m²     General appearance: No apparent distress, appears stated age and cooperative. HEENT: Pupils equal, round, and reactive to light. Conjunctivae/corneas clear. Neck: Supple. No jugular venous distention. Trachea midline. Respiratory:  Normal respiratory effort. Clear to auscultation, bilaterally without Rales/Wheezes/Rhonchi. Cardiovascular: Regular rate and rhythm with normal S1/S2 without murmurs, rubs or gallops. Abdomen: Soft, non-tender, non-distended with normal bowel sounds. Musculoskeletal: No clubbing, cyanosis or edema bilaterally. Brisk capillary refill. 2+ lower extremity pulses (dorsalis pedis).    Skin:  No rashes    Neurologic: awake, alert and following commands; grossly nonfocal physical exam.     Medications:  Reviewed    Infusion Medications   Scheduled Medications    ARIPiprazole  15 mg Oral Daily    LORazepam  0.5 mg Oral Nightly    hydrOXYzine  25 mg Oral TID    sertraline  100 mg Oral BID    metoprolol tartrate  25 mg Oral BID    sodium chloride flush  10 mL Intravenous 2 times per day    enoxaparin  40 mg Subcutaneous Daily    atorvastatin  80 mg Oral Nightly     PRN Meds: sodium chloride flush, magnesium hydroxide, ondansetron    I/O    Intake/Output Summary (Last 24 hours) at 07/10/18 0900  Last data filed at 07/10/18 0603   Gross per 24 hour   Intake             1560 ml   Output             1300 ml   Net              260 ml       Labs:   Recent Labs      07/09/18   0135  07/10/18   0419   WBC  9.3  7.5   HGB  10.1*  9.4*   HCT  33.6*  32.5*   PLT  477*  424       Recent Labs      07/09/18   0135  07/10/18   0419   NA  140  139   K  3.4*  4.1   CL  101  104   CO2  27  25   BUN  7  9   CREATININE  0.7  0.6   CALCIUM  9.0  8.6       Recent Labs      07/09/18 0135   PROT  6.8   ALKPHOS  83   ALT  10   AST  12   BILITOT  0.2   AMYLASE  30   LIPASE  31       Recent Labs      07/09/18 0135   INR  1.1       Recent Labs      07/09/18 0135 07/09/18   0811   CKTOTAL  47   --    TROPONINI  <0.01  <0.01       Chronic labs:  Lab Results   Component Value Date    CHOL 206 (H) 10/06/2015    TRIG 201 (H) 10/06/2015    HDL 34 07/09/2018    LDLCALC 126 (H) 07/09/2018    TSH 1.140 10/06/2015    INR 1.1 07/09/2018    LABA1C 5.5 07/09/2018       Radiology:  Imaging studies reviewed today. ASSESSMENT:    Principal Problem:    Stroke-like symptoms  Active Problems:    Chest pain    Primary osteoarthritis of left knee    Depression    Tobacco dependency    Class 2 obesity  Resolved Problems:    * No resolved hospital problems. *       Body mass index is 38.69 kg/m². PLAN:  CTA head and neck, CT brain perfusion reviewed. Are normal  Echo is reassuring  Cannot tolerate MRI brain.  Given no focal symptoms today and full resolution and normal brain perfusion studies on admission will cancel MRI  Continue Vistaril, Abilify, Zoloft  Near perfect PTOT evaluation scores  Discharge home today    Diet: DIET CARDIAC;  Code Status: Full Code     PT/OT Eval Status: [] Ordered [x] Evaluation noted [] Not applicable  DVT Prophylaxis:

## 2018-07-11 LAB
EKG ATRIAL RATE: 66 BPM
EKG P AXIS: 48 DEGREES
EKG P-R INTERVAL: 148 MS
EKG Q-T INTERVAL: 422 MS
EKG QRS DURATION: 104 MS
EKG QTC CALCULATION (BAZETT): 442 MS
EKG R AXIS: 33 DEGREES
EKG T AXIS: 42 DEGREES
EKG VENTRICULAR RATE: 66 BPM

## 2018-08-27 ENCOUNTER — OFFICE VISIT (OUTPATIENT)
Dept: ORTHOPEDIC SURGERY | Age: 43
End: 2018-08-27
Payer: COMMERCIAL

## 2018-08-27 VITALS — HEIGHT: 67 IN | BODY MASS INDEX: 37.67 KG/M2 | WEIGHT: 240 LBS | TEMPERATURE: 98 F

## 2018-08-27 DIAGNOSIS — M17.0 PRIMARY OSTEOARTHRITIS OF BOTH KNEES: Primary | ICD-10-CM

## 2018-08-27 PROCEDURE — G8598 ASA/ANTIPLAT THER USED: HCPCS | Performed by: ORTHOPAEDIC SURGERY

## 2018-08-27 PROCEDURE — G8427 DOCREV CUR MEDS BY ELIG CLIN: HCPCS | Performed by: ORTHOPAEDIC SURGERY

## 2018-08-27 PROCEDURE — 99213 OFFICE O/P EST LOW 20 MIN: CPT | Performed by: ORTHOPAEDIC SURGERY

## 2018-08-27 PROCEDURE — 4004F PT TOBACCO SCREEN RCVD TLK: CPT | Performed by: ORTHOPAEDIC SURGERY

## 2018-08-27 PROCEDURE — G8417 CALC BMI ABV UP PARAM F/U: HCPCS | Performed by: ORTHOPAEDIC SURGERY

## 2018-08-27 NOTE — PROGRESS NOTES
Chief Complaint   Patient presents with    Knee Pain     Bilateral Knee F/U, had Zilretta injection on 5/22/18 with good results. Subjective:     Patient ID: Joshua Biggs is a 41 y.o..  female    Knee Pain  Patient complains of Bilateral knee pain. She has had Pamelia Blizzard in May 2018 with good relief. Past Medical History:   Diagnosis Date    Arthritis     [facility] occlusion [facility]     Depression     Factor VIII deficiency ([facility]     Hx of blood clots 2014    caratid artery blood clot was on coumadin approx 1 yr    Hx of cardiovascular stress test 10/7/2015    lexiscan    Hyperlipidemia     mildly elevated    Hypertension     TIA (transient ischemic attack)     x2  last one 2014  no deficits       Past Surgical History:   Procedure Laterality Date    APPENDECTOMY      CHOLECYSTECTOMY      KNEE ARTHROSCOPY Left 09/15/2017    left knee arthroscopy with chondroplasty, synovectomy and lateral meniscectomy    TONSILLECTOMY         Current Outpatient Prescriptions:     atorvastatin (LIPITOR) 80 MG tablet, Take 1 tablet by mouth nightly, Disp: 30 tablet, Rfl: 3    nicotine polacrilex (NICORETTE) 2 MG gum, Take 1 each by mouth as needed for Smoking cessation Maximum dose: 24 pieces/24 hours. , Disp: 110 each, Rfl: 3    aspirin EC 81 MG EC tablet, Take 1 tablet by mouth daily, Disp: 30 tablet, Rfl: 3    metoprolol tartrate (LOPRESSOR) 25 MG tablet, take 1 tablet by mouth twice a day, Disp: , Rfl: 0    eszopiclone (LUNESTA) 3 MG TABS, Take 3 mg by mouth nightly, Disp: , Rfl:     sertraline (ZOLOFT) 100 MG tablet, Take 100 mg by mouth 2 times daily , Disp: , Rfl:     ARIPiprazole (ABILIFY) 15 MG tablet, Take 15 mg by mouth daily, Disp: , Rfl:   Allergies   Allergen Reactions    Amlodipine Swelling     Leg swelling    Azithromycin Hives    Codeine Hives    Corn Oil Other (See Comments)     Causes deafness    Erythromycin Hives    Pcn [Penicillins] PERRLA/EOMI sclerea are white. Conjunctivae are clear. TM's are intact. Pharynx is pink and moist.  Uvula and tongue are midline. Heart: Positive S1 and positive S2 with regular rate and rhythm. Lungs: Clear to auscultation bilaterally without rales, rhonchi or wheezes. Abdomen: soft, nontender. Positive bowel sounds. No organomegaly. No guarding or rigidity. Constitution:    Psycihatric:    The patient is alert and oriented x 3, appears to be stated age and in no distress. Respiratory:    Respiratory effort is not labored. Patient is not gasping. Palpation of the chest reveals no tactile fremitus. Skin:    Upon inspection: the skin appears warm, dry and intact. There is  a previous scar over the affected area. There is any cellulitis, lymphedema or cutaneous lesions noted in the lower extremities. Upon palpation there is no induration noted. Neurologic:    Gait: antalgic; Motor exam of the lower extremities show ; quadriceps, hamstrings, foot dorsi and plantar flexors intact R.  5/5 and L. 5/5. Deep tendon reflexes are 2/4 at the knees and 2/4 at the ankles with strong extensor hallicus longus motor strength bilaterally. Sensory to both feet is intact to all sensory roots. Cardiovascular: The vascular exam is normal and is well perfused to distal extremities. Distal pulses DP/PT: R. 2+; L. 2+. There is cap refill noted less than two seconds in all digits. There is not edema of the bilateral lower extremities. There is not varicosities noted in the distal extremities. Lymph:    Upon palpation,  there is no lymphadenopathy noted in bilateral lower extremities. Musculoskeletal:      Gait: antalgic; examination of the nails and digits reveal no cyanosis or clubbing. Lumbar exam:    On visual inspection, there is not deformity of the spine. full range of motion, no tenderness, palpable spasm or pain on motion.  Special tests: Straight Leg Raise negative, Jane test negative. Hip exam-     Upon inspection, there is not deformity noted. Upon palpation there is not tenderness. ROM: is  full and symmetrical.   Strength: Hip Flexors 5/5; Hip Abductors 5/5; Hip Adduction 5/5. Knee exam    Knee exam     Left knee exam shows;  range of motion of R. Knee is 0 to 120, and L. Knee is 0 to 110. The patient does have  pain on motion, effusion is mild, there is tenderness over the  global region, there are not any masses, there is not ligamentous instability, there is not  deformity noted. Knee exam: left positive for moderate crepitations, some mild tenderness laxity is not noted with anmy stress. There is not a popliteal cyst.     R. Knee:  Lachman's negative, Anterior Drawer negative, Posterior Drawer negative  Kenisha's negative, Thallasy  negative,   PF grind test negative, Apprehension test negative, Patellar J sign  negative  L. Knee:  Lachman's negative, Anterior Drawer negative, Posterior Drawer negative  Kenisha's negative, Thallasy  negative,   PF grind test positive, Apprehension test negative,  Patellar J sign  negative       Xray Exam:  Findings:   AP, lateral and tangent views of the  right knee obtained.  Bony   alignment intact.  No marked joint space narrowing, fracture or joint   effusion. Radiographic findings reviewed with patient      Assessment:  Encounter Diagnoses   Name Primary?  Primary osteoarthritis of both knees Yes           Plan:  Natural history and expected course discussed. Questions answered. Educational materials distributed. Rest, ice, compression, and elevation (RICE) therapy. Reduction in offending activity. The patient has failed conservative measures such as NSAIDS, HEP, and cortisone injections. She is an excellent candidate for Zilretta injections  in the Bilateral knee.  We will contact the patient's insurance company and see them back in the office once we have received approval.

## 2018-09-29 ENCOUNTER — HOSPITAL ENCOUNTER (EMERGENCY)
Age: 43
Discharge: HOME | End: 2018-09-30
Payer: COMMERCIAL

## 2018-09-29 ENCOUNTER — APPOINTMENT (OUTPATIENT)
Dept: CT IMAGING | Age: 43
End: 2018-09-29
Payer: COMMERCIAL

## 2018-09-29 VITALS
RESPIRATION RATE: 18 BRPM | HEIGHT: 67 IN | OXYGEN SATURATION: 100 % | DIASTOLIC BLOOD PRESSURE: 84 MMHG | SYSTOLIC BLOOD PRESSURE: 140 MMHG | WEIGHT: 239 LBS | HEART RATE: 64 BPM | BODY MASS INDEX: 37.51 KG/M2 | TEMPERATURE: 97.9 F

## 2018-09-29 DIAGNOSIS — S05.10XA CONTUSION OF EYE, UNSPECIFIED LATERALITY, INITIAL ENCOUNTER: Primary | ICD-10-CM

## 2018-09-29 LAB — HCG(URINE) PREGNANCY TEST: NEGATIVE

## 2018-09-29 PROCEDURE — 99283 EMERGENCY DEPT VISIT LOW MDM: CPT

## 2018-09-29 PROCEDURE — 6370000000 HC RX 637 (ALT 250 FOR IP)

## 2018-09-29 PROCEDURE — 6360000002 HC RX W HCPCS: Performed by: NURSE PRACTITIONER

## 2018-09-29 PROCEDURE — 81025 URINE PREGNANCY TEST: CPT

## 2018-09-29 PROCEDURE — 96372 THER/PROPH/DIAG INJ SC/IM: CPT

## 2018-09-29 PROCEDURE — 70480 CT ORBIT/EAR/FOSSA W/O DYE: CPT

## 2018-09-29 RX ORDER — TETRACAINE HYDROCHLORIDE 5 MG/ML
2 SOLUTION OPHTHALMIC ONCE
Status: COMPLETED | OUTPATIENT
Start: 2018-09-29 | End: 2018-09-29

## 2018-09-29 RX ORDER — KETOROLAC TROMETHAMINE 30 MG/ML
30 INJECTION, SOLUTION INTRAMUSCULAR; INTRAVENOUS ONCE
Status: COMPLETED | OUTPATIENT
Start: 2018-09-29 | End: 2018-09-29

## 2018-09-29 RX ORDER — TETRACAINE HYDROCHLORIDE 5 MG/ML
SOLUTION OPHTHALMIC
Status: COMPLETED
Start: 2018-09-29 | End: 2018-09-29

## 2018-09-29 RX ADMIN — TETRACAINE HYDROCHLORIDE 2 DROP: 5 SOLUTION OPHTHALMIC at 21:44

## 2018-09-29 RX ADMIN — FLUORESCEIN SODIUM 1 MG: 1 STRIP OPHTHALMIC at 21:44

## 2018-09-29 RX ADMIN — KETOROLAC TROMETHAMINE 30 MG: 30 INJECTION, SOLUTION INTRAMUSCULAR at 22:13

## 2018-09-29 ASSESSMENT — VISUAL ACUITY
OU: 20/50
OS: 20/40
OD: 20/50

## 2018-09-29 ASSESSMENT — PAIN SCALES - GENERAL
PAINLEVEL_OUTOF10: 7
PAINLEVEL_OUTOF10: 4
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
## 2018-11-15 ENCOUNTER — OFFICE VISIT (OUTPATIENT)
Dept: ORTHOPEDIC SURGERY | Age: 43
End: 2018-11-15
Payer: COMMERCIAL

## 2018-11-15 DIAGNOSIS — M17.0 PRIMARY OSTEOARTHRITIS OF BOTH KNEES: Primary | ICD-10-CM

## 2018-11-15 PROCEDURE — 20610 DRAIN/INJ JOINT/BURSA W/O US: CPT | Performed by: ORTHOPAEDIC SURGERY

## 2018-11-16 NOTE — PROGRESS NOTES
I will proceed with a cortisone injection in the Right knee. Verbal and written consent was obtained for the injections. The skin was prepped with alcohol. A prepared mixture of 32 mg of Zilretta and 5mL diluent was injected to Right knee. The injection was given through the lateral side of the knee. The patient tolerated the injection well. I will proceed with a cortisone injection in the Left knee. Verbal and written consent was obtained for the injections. The skin was prepped with alcohol. A prepared mixture of 32 mg of Zilretta and 5mL diluent was injected to Left knee. The injection was given through the lateral side of the knee. The patient tolerated the injection well.  I will see the patient back prn

## 2018-12-19 ENCOUNTER — HOSPITAL ENCOUNTER (OUTPATIENT)
Age: 43
Discharge: HOME | End: 2018-12-19
Payer: COMMERCIAL

## 2018-12-19 ENCOUNTER — HOSPITAL ENCOUNTER (OUTPATIENT)
Age: 43
Discharge: HOME | End: 2018-12-21
Payer: COMMERCIAL

## 2018-12-19 ENCOUNTER — HOSPITAL ENCOUNTER (OUTPATIENT)
Dept: ULTRASOUND IMAGING | Age: 43
Discharge: HOME | End: 2018-12-19
Payer: COMMERCIAL

## 2018-12-19 ENCOUNTER — HOSPITAL ENCOUNTER (OUTPATIENT)
Dept: GENERAL RADIOLOGY | Age: 43
Discharge: HOME | End: 2018-12-21
Payer: COMMERCIAL

## 2018-12-19 ENCOUNTER — HOSPITAL ENCOUNTER (OUTPATIENT)
Dept: MAMMOGRAPHY | Age: 43
Discharge: HOME | End: 2018-12-21
Payer: COMMERCIAL

## 2018-12-19 DIAGNOSIS — R52 PAIN: ICD-10-CM

## 2018-12-19 DIAGNOSIS — J41.0 SIMPLE CHRONIC BRONCHITIS (HCC): ICD-10-CM

## 2018-12-19 DIAGNOSIS — Z12.39 BREAST CANCER SCREENING: ICD-10-CM

## 2018-12-19 LAB
ALBUMIN SERPL-MCNC: 4.1 G/DL (ref 3.5–5.2)
ALP BLD-CCNC: 79 U/L (ref 35–104)
ALT SERPL-CCNC: 14 U/L (ref 0–32)
ANION GAP SERPL CALCULATED.3IONS-SCNC: 10 MMOL/L (ref 7–16)
ANISOCYTOSIS: ABNORMAL
AST SERPL-CCNC: 15 U/L (ref 0–31)
BACTERIA: ABNORMAL /HPF
BASOPHILS ABSOLUTE: 0.07 E9/L (ref 0–0.2)
BASOPHILS RELATIVE PERCENT: 0.8 % (ref 0–2)
BILIRUB SERPL-MCNC: <0.2 MG/DL (ref 0–1.2)
BILIRUBIN URINE: NEGATIVE
BLOOD, URINE: ABNORMAL
BUN BLDV-MCNC: 14 MG/DL (ref 6–20)
CALCIUM SERPL-MCNC: 8.8 MG/DL (ref 8.6–10.2)
CHLORIDE BLD-SCNC: 102 MMOL/L (ref 98–107)
CHOLESTEROL, TOTAL: 216 MG/DL (ref 0–199)
CLARITY: ABNORMAL
CO2: 24 MMOL/L (ref 22–29)
COLOR: YELLOW
CREAT SERPL-MCNC: 0.6 MG/DL (ref 0.5–1)
EOSINOPHILS ABSOLUTE: 0.19 E9/L (ref 0.05–0.5)
EOSINOPHILS RELATIVE PERCENT: 2 % (ref 0–6)
EPITHELIAL CELLS, UA: ABNORMAL /HPF
GFR AFRICAN AMERICAN: >60
GFR NON-AFRICAN AMERICAN: >60 ML/MIN/1.73
GLUCOSE BLD-MCNC: 139 MG/DL (ref 74–99)
GLUCOSE URINE: NEGATIVE MG/DL
HCT VFR BLD CALC: 37.1 % (ref 34–48)
HDLC SERPL-MCNC: 50 MG/DL
HEMOGLOBIN: 10.6 G/DL (ref 11.5–15.5)
HYPOCHROMIA: ABNORMAL
IMMATURE GRANULOCYTES #: 0.04 E9/L
IMMATURE GRANULOCYTES %: 0.4 % (ref 0–5)
KETONES, URINE: NEGATIVE MG/DL
LDL CHOLESTEROL CALCULATED: 145 MG/DL (ref 0–99)
LEUKOCYTE ESTERASE, URINE: ABNORMAL
LYMPHOCYTES ABSOLUTE: 3.11 E9/L (ref 1.5–4)
LYMPHOCYTES RELATIVE PERCENT: 33.5 % (ref 20–42)
MCH RBC QN AUTO: 21.5 PG (ref 26–35)
MCHC RBC AUTO-ENTMCNC: 28.6 % (ref 32–34.5)
MCV RBC AUTO: 75.1 FL (ref 80–99.9)
MONOCYTES ABSOLUTE: 0.56 E9/L (ref 0.1–0.95)
MONOCYTES RELATIVE PERCENT: 6 % (ref 2–12)
NEUTROPHILS ABSOLUTE: 5.31 E9/L (ref 1.8–7.3)
NEUTROPHILS RELATIVE PERCENT: 57.3 % (ref 43–80)
NITRITE, URINE: NEGATIVE
OVALOCYTES: ABNORMAL
PDW BLD-RTO: 19.3 FL (ref 11.5–15)
PH UA: 5.5 (ref 5–9)
PLATELET # BLD: 482 E9/L (ref 130–450)
PMV BLD AUTO: 9.4 FL (ref 7–12)
POIKILOCYTES: ABNORMAL
POLYCHROMASIA: ABNORMAL
POTASSIUM SERPL-SCNC: 4.1 MMOL/L (ref 3.5–5)
PROTEIN UA: NEGATIVE MG/DL
RBC # BLD: 4.94 E12/L (ref 3.5–5.5)
RBC UA: ABNORMAL /HPF (ref 0–2)
SODIUM BLD-SCNC: 136 MMOL/L (ref 132–146)
SPECIFIC GRAVITY UA: 1.02 (ref 1–1.03)
T3 UPTAKE PERCENT: 30.9 % (ref 22.5–37)
T4 TOTAL: 7.2 MCG/DL (ref 4.5–11.7)
TARGET CELLS: ABNORMAL
TOTAL PROTEIN: 7.2 G/DL (ref 6.4–8.3)
TRIGL SERPL-MCNC: 104 MG/DL (ref 0–149)
TSH SERPL DL<=0.05 MIU/L-ACNC: 1.57 UIU/ML (ref 0.27–4.2)
UROBILINOGEN, URINE: 0.2 E.U./DL
VLDLC SERPL CALC-MCNC: 21 MG/DL
WBC # BLD: 9.3 E9/L (ref 4.5–11.5)
WBC UA: >20 /HPF (ref 0–5)

## 2018-12-19 PROCEDURE — 71046 X-RAY EXAM CHEST 2 VIEWS: CPT

## 2018-12-19 PROCEDURE — 80053 COMPREHEN METABOLIC PANEL: CPT

## 2018-12-19 PROCEDURE — 76856 US EXAM PELVIC COMPLETE: CPT

## 2018-12-19 PROCEDURE — 81001 URINALYSIS AUTO W/SCOPE: CPT

## 2018-12-19 PROCEDURE — 84479 ASSAY OF THYROID (T3 OR T4): CPT

## 2018-12-19 PROCEDURE — 80061 LIPID PANEL: CPT

## 2018-12-19 PROCEDURE — 84443 ASSAY THYROID STIM HORMONE: CPT

## 2018-12-19 PROCEDURE — 77067 SCR MAMMO BI INCL CAD: CPT

## 2018-12-19 PROCEDURE — 85025 COMPLETE CBC W/AUTO DIFF WBC: CPT

## 2018-12-19 PROCEDURE — 84436 ASSAY OF TOTAL THYROXINE: CPT

## 2018-12-19 PROCEDURE — 36415 COLL VENOUS BLD VENIPUNCTURE: CPT

## 2019-01-22 ENCOUNTER — HOSPITAL ENCOUNTER (OUTPATIENT)
Age: 44
Discharge: HOME | End: 2019-01-22
Payer: COMMERCIAL

## 2019-01-22 LAB
BASOPHILS ABSOLUTE: 0.07 E9/L (ref 0–0.2)
BASOPHILS RELATIVE PERCENT: 0.7 % (ref 0–2)
EOSINOPHILS ABSOLUTE: 0.24 E9/L (ref 0.05–0.5)
EOSINOPHILS RELATIVE PERCENT: 2.4 % (ref 0–6)
FERRITIN: 6 NG/ML
HCT VFR BLD CALC: 33.9 % (ref 34–48)
HEMOGLOBIN: 9.9 G/DL (ref 11.5–15.5)
IMMATURE GRANULOCYTES #: 0.04 E9/L
IMMATURE GRANULOCYTES %: 0.4 % (ref 0–5)
IRON SATURATION: 5 % (ref 15–50)
IRON: 21 MCG/DL (ref 37–145)
LYMPHOCYTES ABSOLUTE: 3.01 E9/L (ref 1.5–4)
LYMPHOCYTES RELATIVE PERCENT: 30 % (ref 20–42)
MCH RBC QN AUTO: 21.9 PG (ref 26–35)
MCHC RBC AUTO-ENTMCNC: 29.2 % (ref 32–34.5)
MCV RBC AUTO: 75 FL (ref 80–99.9)
MONOCYTES ABSOLUTE: 0.62 E9/L (ref 0.1–0.95)
MONOCYTES RELATIVE PERCENT: 6.2 % (ref 2–12)
NEUTROPHILS ABSOLUTE: 6.04 E9/L (ref 1.8–7.3)
NEUTROPHILS RELATIVE PERCENT: 60.3 % (ref 43–80)
PDW BLD-RTO: 19.7 FL (ref 11.5–15)
PLATELET # BLD: 421 E9/L (ref 130–450)
PMV BLD AUTO: 9.4 FL (ref 7–12)
RBC # BLD: 4.52 E12/L (ref 3.5–5.5)
TOTAL IRON BINDING CAPACITY: 406 MCG/DL (ref 250–450)
WBC # BLD: 10 E9/L (ref 4.5–11.5)

## 2019-01-22 PROCEDURE — 85025 COMPLETE CBC W/AUTO DIFF WBC: CPT

## 2019-01-22 PROCEDURE — 36415 COLL VENOUS BLD VENIPUNCTURE: CPT

## 2019-01-22 PROCEDURE — 82728 ASSAY OF FERRITIN: CPT

## 2019-01-22 PROCEDURE — 83540 ASSAY OF IRON: CPT

## 2019-01-22 PROCEDURE — 83550 IRON BINDING TEST: CPT

## 2019-02-01 ENCOUNTER — HOSPITAL ENCOUNTER (EMERGENCY)
Age: 44
Discharge: HOME | End: 2019-02-01
Attending: EMERGENCY MEDICINE
Payer: COMMERCIAL

## 2019-02-01 ENCOUNTER — APPOINTMENT (OUTPATIENT)
Dept: CT IMAGING | Age: 44
End: 2019-02-01
Payer: COMMERCIAL

## 2019-02-01 VITALS
OXYGEN SATURATION: 99 % | HEIGHT: 69 IN | BODY MASS INDEX: 38.66 KG/M2 | SYSTOLIC BLOOD PRESSURE: 141 MMHG | TEMPERATURE: 98.3 F | DIASTOLIC BLOOD PRESSURE: 70 MMHG | RESPIRATION RATE: 16 BRPM | HEART RATE: 66 BPM | WEIGHT: 261 LBS

## 2019-02-01 DIAGNOSIS — R10.32 ABDOMINAL PAIN, LEFT LOWER QUADRANT: Primary | ICD-10-CM

## 2019-02-01 LAB
ALBUMIN SERPL-MCNC: 4.2 G/DL (ref 3.5–5.2)
ALP BLD-CCNC: 80 U/L (ref 35–104)
ALT SERPL-CCNC: 19 U/L (ref 0–32)
ANION GAP SERPL CALCULATED.3IONS-SCNC: 10 MMOL/L (ref 7–16)
AST SERPL-CCNC: 38 U/L (ref 0–31)
BACTERIA: ABNORMAL /HPF
BASOPHILS ABSOLUTE: 0.08 E9/L (ref 0–0.2)
BASOPHILS RELATIVE PERCENT: 0.8 % (ref 0–2)
BILIRUB SERPL-MCNC: <0.2 MG/DL (ref 0–1.2)
BILIRUBIN URINE: NEGATIVE
BLOOD, URINE: NEGATIVE
BUN BLDV-MCNC: 15 MG/DL (ref 6–20)
CALCIUM SERPL-MCNC: 9 MG/DL (ref 8.6–10.2)
CHLORIDE BLD-SCNC: 103 MMOL/L (ref 98–107)
CLARITY: CLEAR
CO2: 21 MMOL/L (ref 22–29)
COLOR: YELLOW
CREAT SERPL-MCNC: 0.6 MG/DL (ref 0.5–1)
EOSINOPHILS ABSOLUTE: 0.29 E9/L (ref 0.05–0.5)
EOSINOPHILS RELATIVE PERCENT: 2.9 % (ref 0–6)
EPITHELIAL CELLS, UA: ABNORMAL /HPF
GFR AFRICAN AMERICAN: >60
GFR NON-AFRICAN AMERICAN: >60 ML/MIN/1.73
GLUCOSE BLD-MCNC: 94 MG/DL (ref 74–99)
GLUCOSE URINE: NEGATIVE MG/DL
HCG, URINE, POC: NEGATIVE
HCT VFR BLD CALC: 35.4 % (ref 34–48)
HEMOGLOBIN: 10.4 G/DL (ref 11.5–15.5)
IMMATURE GRANULOCYTES #: 0.02 E9/L
IMMATURE GRANULOCYTES %: 0.2 % (ref 0–5)
KETONES, URINE: NEGATIVE MG/DL
LACTIC ACID: 0.9 MMOL/L (ref 0.5–2.2)
LEUKOCYTE ESTERASE, URINE: ABNORMAL
LIPASE: 36 U/L (ref 13–60)
LYMPHOCYTES ABSOLUTE: 2.98 E9/L (ref 1.5–4)
LYMPHOCYTES RELATIVE PERCENT: 30.1 % (ref 20–42)
Lab: NORMAL
MCH RBC QN AUTO: 21.4 PG (ref 26–35)
MCHC RBC AUTO-ENTMCNC: 29.4 % (ref 32–34.5)
MCV RBC AUTO: 72.7 FL (ref 80–99.9)
MONOCYTES ABSOLUTE: 0.9 E9/L (ref 0.1–0.95)
MONOCYTES RELATIVE PERCENT: 9.1 % (ref 2–12)
NEGATIVE QC PASS/FAIL: NORMAL
NEUTROPHILS ABSOLUTE: 5.63 E9/L (ref 1.8–7.3)
NEUTROPHILS RELATIVE PERCENT: 56.9 % (ref 43–80)
NITRITE, URINE: NEGATIVE
PDW BLD-RTO: 19.8 FL (ref 11.5–15)
PH UA: 6.5 (ref 5–9)
PLATELET # BLD: 456 E9/L (ref 130–450)
PMV BLD AUTO: 9.9 FL (ref 7–12)
POSITIVE QC PASS/FAIL: NORMAL
POTASSIUM SERPL-SCNC: 4.3 MMOL/L (ref 3.5–5)
PROTEIN UA: NEGATIVE MG/DL
RBC # BLD: 4.87 E12/L (ref 3.5–5.5)
RBC UA: ABNORMAL /HPF (ref 0–2)
SODIUM BLD-SCNC: 134 MMOL/L (ref 132–146)
SPECIFIC GRAVITY UA: <=1.005 (ref 1–1.03)
TOTAL PROTEIN: 7.1 G/DL (ref 6.4–8.3)
UROBILINOGEN, URINE: 0.2 E.U./DL
WBC # BLD: 9.9 E9/L (ref 4.5–11.5)
WBC UA: ABNORMAL /HPF (ref 0–5)

## 2019-02-01 PROCEDURE — 99284 EMERGENCY DEPT VISIT MOD MDM: CPT

## 2019-02-01 PROCEDURE — 81001 URINALYSIS AUTO W/SCOPE: CPT

## 2019-02-01 PROCEDURE — 74177 CT ABD & PELVIS W/CONTRAST: CPT

## 2019-02-01 PROCEDURE — 6360000004 HC RX CONTRAST MEDICATION: Performed by: RADIOLOGY

## 2019-02-01 PROCEDURE — 85025 COMPLETE CBC W/AUTO DIFF WBC: CPT

## 2019-02-01 PROCEDURE — 6360000002 HC RX W HCPCS: Performed by: EMERGENCY MEDICINE

## 2019-02-01 PROCEDURE — 80053 COMPREHEN METABOLIC PANEL: CPT

## 2019-02-01 PROCEDURE — 36415 COLL VENOUS BLD VENIPUNCTURE: CPT

## 2019-02-01 PROCEDURE — 2580000003 HC RX 258: Performed by: EMERGENCY MEDICINE

## 2019-02-01 PROCEDURE — 96374 THER/PROPH/DIAG INJ IV PUSH: CPT

## 2019-02-01 PROCEDURE — 83605 ASSAY OF LACTIC ACID: CPT

## 2019-02-01 PROCEDURE — 83690 ASSAY OF LIPASE: CPT

## 2019-02-01 PROCEDURE — 96375 TX/PRO/DX INJ NEW DRUG ADDON: CPT

## 2019-02-01 RX ORDER — ONDANSETRON 2 MG/ML
4 INJECTION INTRAMUSCULAR; INTRAVENOUS ONCE
Status: COMPLETED | OUTPATIENT
Start: 2019-02-01 | End: 2019-02-01

## 2019-02-01 RX ORDER — FENTANYL CITRATE 50 UG/ML
50 INJECTION, SOLUTION INTRAMUSCULAR; INTRAVENOUS ONCE
Status: COMPLETED | OUTPATIENT
Start: 2019-02-01 | End: 2019-02-01

## 2019-02-01 RX ORDER — 0.9 % SODIUM CHLORIDE 0.9 %
1000 INTRAVENOUS SOLUTION INTRAVENOUS ONCE
Status: COMPLETED | OUTPATIENT
Start: 2019-02-01 | End: 2019-02-01

## 2019-02-01 RX ADMIN — ONDANSETRON 4 MG: 2 INJECTION INTRAMUSCULAR; INTRAVENOUS at 06:54

## 2019-02-01 RX ADMIN — IOHEXOL 50 ML: 240 INJECTION, SOLUTION INTRATHECAL; INTRAVASCULAR; INTRAVENOUS; ORAL at 08:26

## 2019-02-01 RX ADMIN — FENTANYL CITRATE 50 MCG: 50 INJECTION, SOLUTION INTRAMUSCULAR; INTRAVENOUS at 06:54

## 2019-02-01 RX ADMIN — IOPAMIDOL 80 ML: 755 INJECTION, SOLUTION INTRAVENOUS at 08:26

## 2019-02-01 RX ADMIN — SODIUM CHLORIDE 1000 ML: 9 INJECTION, SOLUTION INTRAVENOUS at 06:54

## 2019-02-01 ASSESSMENT — ENCOUNTER SYMPTOMS
DIARRHEA: 0
VOMITING: 0
ABDOMINAL PAIN: 1
NAUSEA: 0
SHORTNESS OF BREATH: 0
CONSTIPATION: 0
COLOR CHANGE: 0
COUGH: 0
BLOOD IN STOOL: 0

## 2019-02-01 ASSESSMENT — PAIN DESCRIPTION - LOCATION: LOCATION: ABDOMEN

## 2019-02-01 ASSESSMENT — PAIN SCALES - GENERAL
PAINLEVEL_OUTOF10: 10
PAINLEVEL_OUTOF10: 10

## 2019-02-01 ASSESSMENT — PAIN DESCRIPTION - ORIENTATION: ORIENTATION: LEFT;LOWER

## 2019-02-01 ASSESSMENT — PAIN DESCRIPTION - DESCRIPTORS: DESCRIPTORS: ACHING

## 2019-02-01 ASSESSMENT — PAIN DESCRIPTION - FREQUENCY: FREQUENCY: INTERMITTENT

## 2019-11-12 ENCOUNTER — APPOINTMENT (OUTPATIENT)
Dept: CT IMAGING | Age: 44
End: 2019-11-12

## 2019-11-12 ENCOUNTER — HOSPITAL ENCOUNTER (EMERGENCY)
Age: 44
Discharge: HOME | End: 2019-11-12
Attending: EMERGENCY MEDICINE

## 2019-11-12 VITALS
WEIGHT: 245 LBS | TEMPERATURE: 98.6 F | DIASTOLIC BLOOD PRESSURE: 70 MMHG | OXYGEN SATURATION: 95 % | BODY MASS INDEX: 38.45 KG/M2 | HEIGHT: 67 IN | RESPIRATION RATE: 20 BRPM | SYSTOLIC BLOOD PRESSURE: 152 MMHG | HEART RATE: 62 BPM

## 2019-11-12 DIAGNOSIS — R10.32 LEFT LOWER QUADRANT ABDOMINAL PAIN: Primary | ICD-10-CM

## 2019-11-12 LAB
ALBUMIN SERPL-MCNC: 4.5 G/DL (ref 3.5–5.2)
ALP BLD-CCNC: 98 U/L (ref 35–104)
ALT SERPL-CCNC: 14 U/L (ref 0–32)
ANION GAP SERPL CALCULATED.3IONS-SCNC: 12 MMOL/L (ref 7–16)
AST SERPL-CCNC: 19 U/L (ref 0–31)
BACTERIA: ABNORMAL /HPF
BASOPHILS ABSOLUTE: 0.07 E9/L (ref 0–0.2)
BASOPHILS RELATIVE PERCENT: 1 % (ref 0–2)
BILIRUB SERPL-MCNC: 0.3 MG/DL (ref 0–1.2)
BILIRUBIN URINE: NEGATIVE
BLOOD, URINE: NEGATIVE
BUN BLDV-MCNC: 5 MG/DL (ref 6–20)
CALCIUM SERPL-MCNC: 9 MG/DL (ref 8.6–10.2)
CHLORIDE BLD-SCNC: 105 MMOL/L (ref 98–107)
CLARITY: CLEAR
CO2: 25 MMOL/L (ref 22–29)
COLOR: YELLOW
CREAT SERPL-MCNC: 0.7 MG/DL (ref 0.5–1)
EOSINOPHILS ABSOLUTE: 0.3 E9/L (ref 0.05–0.5)
EOSINOPHILS RELATIVE PERCENT: 4.3 % (ref 0–6)
EPITHELIAL CELLS, UA: ABNORMAL /HPF
GFR AFRICAN AMERICAN: >60
GFR NON-AFRICAN AMERICAN: >60 ML/MIN/1.73
GLUCOSE BLD-MCNC: 109 MG/DL (ref 74–99)
GLUCOSE URINE: NEGATIVE MG/DL
HCG, URINE, POC: NEGATIVE
HCT VFR BLD CALC: 36.8 % (ref 34–48)
HEMOGLOBIN: 10.8 G/DL (ref 11.5–15.5)
IMMATURE GRANULOCYTES #: 0.03 E9/L
IMMATURE GRANULOCYTES %: 0.4 % (ref 0–5)
KETONES, URINE: NEGATIVE MG/DL
LACTIC ACID: 1.4 MMOL/L (ref 0.5–2.2)
LEUKOCYTE ESTERASE, URINE: NEGATIVE
LIPASE: 28 U/L (ref 13–60)
LYMPHOCYTES ABSOLUTE: 2.12 E9/L (ref 1.5–4)
LYMPHOCYTES RELATIVE PERCENT: 30.5 % (ref 20–42)
Lab: NORMAL
MCH RBC QN AUTO: 21.8 PG (ref 26–35)
MCHC RBC AUTO-ENTMCNC: 29.3 % (ref 32–34.5)
MCV RBC AUTO: 74.3 FL (ref 80–99.9)
MONOCYTES ABSOLUTE: 0.44 E9/L (ref 0.1–0.95)
MONOCYTES RELATIVE PERCENT: 6.3 % (ref 2–12)
NEGATIVE QC PASS/FAIL: NORMAL
NEUTROPHILS ABSOLUTE: 4 E9/L (ref 1.8–7.3)
NEUTROPHILS RELATIVE PERCENT: 57.5 % (ref 43–80)
NITRITE, URINE: NEGATIVE
PDW BLD-RTO: 18 FL (ref 11.5–15)
PH UA: 6.5 (ref 5–9)
PLATELET # BLD: 434 E9/L (ref 130–450)
PMV BLD AUTO: 9.7 FL (ref 7–12)
POSITIVE QC PASS/FAIL: NORMAL
POTASSIUM SERPL-SCNC: 3.8 MMOL/L (ref 3.5–5)
PROTEIN UA: NORMAL MG/DL
RBC # BLD: 4.95 E12/L (ref 3.5–5.5)
RBC UA: ABNORMAL /HPF (ref 0–2)
SODIUM BLD-SCNC: 142 MMOL/L (ref 132–146)
SPECIFIC GRAVITY UA: 1.02 (ref 1–1.03)
TOTAL PROTEIN: 7.3 G/DL (ref 6.4–8.3)
UROBILINOGEN, URINE: 1 E.U./DL
WBC # BLD: 7 E9/L (ref 4.5–11.5)
WBC UA: ABNORMAL /HPF (ref 0–5)

## 2019-11-12 PROCEDURE — 83690 ASSAY OF LIPASE: CPT

## 2019-11-12 PROCEDURE — 80053 COMPREHEN METABOLIC PANEL: CPT

## 2019-11-12 PROCEDURE — 6360000004 HC RX CONTRAST MEDICATION: Performed by: RADIOLOGY

## 2019-11-12 PROCEDURE — 2580000003 HC RX 258: Performed by: EMERGENCY MEDICINE

## 2019-11-12 PROCEDURE — 96375 TX/PRO/DX INJ NEW DRUG ADDON: CPT

## 2019-11-12 PROCEDURE — 99284 EMERGENCY DEPT VISIT MOD MDM: CPT

## 2019-11-12 PROCEDURE — 81001 URINALYSIS AUTO W/SCOPE: CPT

## 2019-11-12 PROCEDURE — 6360000002 HC RX W HCPCS: Performed by: EMERGENCY MEDICINE

## 2019-11-12 PROCEDURE — 96374 THER/PROPH/DIAG INJ IV PUSH: CPT

## 2019-11-12 PROCEDURE — 85025 COMPLETE CBC W/AUTO DIFF WBC: CPT

## 2019-11-12 PROCEDURE — 36415 COLL VENOUS BLD VENIPUNCTURE: CPT

## 2019-11-12 PROCEDURE — 83605 ASSAY OF LACTIC ACID: CPT

## 2019-11-12 PROCEDURE — 74177 CT ABD & PELVIS W/CONTRAST: CPT

## 2019-11-12 RX ORDER — FENTANYL CITRATE 50 UG/ML
50 INJECTION, SOLUTION INTRAMUSCULAR; INTRAVENOUS ONCE
Status: COMPLETED | OUTPATIENT
Start: 2019-11-12 | End: 2019-11-12

## 2019-11-12 RX ORDER — ONDANSETRON 2 MG/ML
4 INJECTION INTRAMUSCULAR; INTRAVENOUS ONCE
Status: COMPLETED | OUTPATIENT
Start: 2019-11-12 | End: 2019-11-12

## 2019-11-12 RX ORDER — ONDANSETRON 4 MG/1
4 TABLET, ORALLY DISINTEGRATING ORAL EVERY 8 HOURS PRN
Qty: 10 TABLET | Refills: 0 | Status: SHIPPED | OUTPATIENT
Start: 2019-11-12 | End: 2020-07-14 | Stop reason: ALTCHOICE

## 2019-11-12 RX ORDER — 0.9 % SODIUM CHLORIDE 0.9 %
1000 INTRAVENOUS SOLUTION INTRAVENOUS ONCE
Status: COMPLETED | OUTPATIENT
Start: 2019-11-12 | End: 2019-11-12

## 2019-11-12 RX ADMIN — FENTANYL CITRATE 50 MCG: 50 INJECTION, SOLUTION INTRAMUSCULAR; INTRAVENOUS at 05:56

## 2019-11-12 RX ADMIN — SODIUM CHLORIDE 1000 ML: 9 INJECTION, SOLUTION INTRAVENOUS at 05:55

## 2019-11-12 RX ADMIN — ONDANSETRON 4 MG: 2 INJECTION INTRAMUSCULAR; INTRAVENOUS at 05:55

## 2019-11-12 RX ADMIN — IOPAMIDOL 80 ML: 755 INJECTION, SOLUTION INTRAVENOUS at 07:01

## 2019-11-12 ASSESSMENT — ENCOUNTER SYMPTOMS
DIARRHEA: 0
SORE THROAT: 0
SHORTNESS OF BREATH: 0
NAUSEA: 1
COUGH: 0
EYE DISCHARGE: 0
VOMITING: 1
ABDOMINAL PAIN: 1
ABDOMINAL DISTENTION: 0
SINUS PRESSURE: 0
EYE PAIN: 0
WHEEZING: 0
EYE REDNESS: 0
BACK PAIN: 0

## 2019-11-12 ASSESSMENT — PAIN DESCRIPTION - FREQUENCY: FREQUENCY: CONTINUOUS

## 2019-11-12 ASSESSMENT — PAIN SCALES - GENERAL
PAINLEVEL_OUTOF10: 9
PAINLEVEL_OUTOF10: 0
PAINLEVEL_OUTOF10: 10

## 2019-11-12 ASSESSMENT — PAIN DESCRIPTION - PAIN TYPE: TYPE: ACUTE PAIN

## 2019-11-12 ASSESSMENT — PAIN DESCRIPTION - LOCATION: LOCATION: ABDOMEN

## 2019-11-12 ASSESSMENT — PAIN DESCRIPTION - ORIENTATION: ORIENTATION: LEFT;LOWER

## 2019-11-12 ASSESSMENT — PAIN DESCRIPTION - DESCRIPTORS: DESCRIPTORS: ACHING

## 2019-12-11 ENCOUNTER — APPOINTMENT (OUTPATIENT)
Dept: GENERAL RADIOLOGY | Age: 44
End: 2019-12-11
Payer: COMMERCIAL

## 2019-12-11 ENCOUNTER — HOSPITAL ENCOUNTER (EMERGENCY)
Age: 44
Discharge: HOME | End: 2019-12-11
Attending: EMERGENCY MEDICINE
Payer: COMMERCIAL

## 2019-12-11 VITALS
OXYGEN SATURATION: 98 % | TEMPERATURE: 98 F | DIASTOLIC BLOOD PRESSURE: 91 MMHG | SYSTOLIC BLOOD PRESSURE: 163 MMHG | RESPIRATION RATE: 17 BRPM | HEIGHT: 67 IN | BODY MASS INDEX: 36.57 KG/M2 | WEIGHT: 233 LBS | HEART RATE: 60 BPM

## 2019-12-11 DIAGNOSIS — S86.912A STRAIN OF LEFT KNEE, INITIAL ENCOUNTER: Primary | ICD-10-CM

## 2019-12-11 DIAGNOSIS — M25.562 ACUTE PAIN OF LEFT KNEE: ICD-10-CM

## 2019-12-11 PROCEDURE — 99283 EMERGENCY DEPT VISIT LOW MDM: CPT

## 2019-12-11 PROCEDURE — 73560 X-RAY EXAM OF KNEE 1 OR 2: CPT

## 2019-12-11 PROCEDURE — 6370000000 HC RX 637 (ALT 250 FOR IP): Performed by: EMERGENCY MEDICINE

## 2019-12-11 RX ORDER — IBUPROFEN 800 MG/1
800 TABLET ORAL ONCE
Status: COMPLETED | OUTPATIENT
Start: 2019-12-11 | End: 2019-12-11

## 2019-12-11 RX ADMIN — IBUPROFEN 800 MG: 800 TABLET, FILM COATED ORAL at 06:40

## 2019-12-11 ASSESSMENT — PAIN SCALES - GENERAL
PAINLEVEL_OUTOF10: 8
PAINLEVEL_OUTOF10: 10
PAINLEVEL_OUTOF10: 10

## 2019-12-11 ASSESSMENT — PAIN DESCRIPTION - ONSET
ONSET: SUDDEN
ONSET: SUDDEN

## 2019-12-11 ASSESSMENT — PAIN DESCRIPTION - LOCATION
LOCATION: KNEE
LOCATION: KNEE

## 2019-12-11 ASSESSMENT — PAIN - FUNCTIONAL ASSESSMENT: PAIN_FUNCTIONAL_ASSESSMENT: INTOLERABLE, UNABLE TO DO ANY ACTIVE OR PASSIVE ACTIVITIES

## 2019-12-11 ASSESSMENT — PAIN DESCRIPTION - PAIN TYPE
TYPE: ACUTE PAIN
TYPE: ACUTE PAIN

## 2019-12-11 ASSESSMENT — PAIN DESCRIPTION - DESCRIPTORS: DESCRIPTORS: BURNING;THROBBING;SHARP

## 2019-12-11 ASSESSMENT — PAIN DESCRIPTION - FREQUENCY
FREQUENCY: CONTINUOUS
FREQUENCY: CONTINUOUS

## 2019-12-11 ASSESSMENT — PAIN DESCRIPTION - PROGRESSION
CLINICAL_PROGRESSION: NOT CHANGED
CLINICAL_PROGRESSION: GRADUALLY WORSENING

## 2019-12-11 ASSESSMENT — PAIN DESCRIPTION - ORIENTATION
ORIENTATION: LEFT
ORIENTATION: LEFT

## 2019-12-13 ENCOUNTER — OFFICE VISIT (OUTPATIENT)
Dept: ORTHOPEDIC SURGERY | Age: 44
End: 2019-12-13
Payer: COMMERCIAL

## 2019-12-13 VITALS — HEIGHT: 67 IN | TEMPERATURE: 98 F | WEIGHT: 233 LBS | BODY MASS INDEX: 36.57 KG/M2

## 2019-12-13 DIAGNOSIS — S83.207A TEAR OF MENISCUS OF LEFT KNEE, UNSPECIFIED MENISCUS, UNSPECIFIED TEAR TYPE, UNSPECIFIED WHETHER OLD OR CURRENT TEAR: Primary | ICD-10-CM

## 2019-12-13 PROCEDURE — 99203 OFFICE O/P NEW LOW 30 MIN: CPT | Performed by: ORTHOPAEDIC SURGERY

## 2019-12-13 RX ORDER — MELOXICAM 15 MG/1
15 TABLET ORAL DAILY
Qty: 30 TABLET | Refills: 2 | Status: SHIPPED
Start: 2019-12-13 | End: 2020-07-14 | Stop reason: ALTCHOICE

## 2020-01-02 ENCOUNTER — OFFICE VISIT (OUTPATIENT)
Dept: ORTHOPEDIC SURGERY | Age: 45
End: 2020-01-02
Payer: COMMERCIAL

## 2020-01-02 VITALS — WEIGHT: 233 LBS | HEIGHT: 67 IN | BODY MASS INDEX: 36.57 KG/M2

## 2020-01-02 PROCEDURE — 99214 OFFICE O/P EST MOD 30 MIN: CPT | Performed by: ORTHOPAEDIC SURGERY

## 2020-01-02 NOTE — PATIENT INSTRUCTIONS
Patient Education        Knee Arthroscopy: Before Your Surgery  What is knee arthroscopy? Arthroscopy is a way to find problems and do surgery inside a joint without making a large cut (incision). Your doctor puts a lighted tube with a tiny camera and surgical tools through small incisions in your knee. The camera is called an arthroscope, or scope. In this surgery, your doctor may:  · Remove or repair a torn piece of cartilage or loose bone. · Replace a torn anterior cruciate ligament (ACL) with a piece of tissue. This repair is called a graft. · Smooth the rough surfaces of your joint. Most people go home on the day of the surgery or the next day. If you have a simple injury, it may take at least 6 weeks to recover. It may take longer if your doctor had to repair damaged tissue. You will need to limit activity while your knee heals. You may need to have physical therapy (rehab) to help your knee get stronger. If you have a desk job, you may be able to go back to work a few days after treatment of a simple injury. If you lift things or stand or walk a lot at work, it may be 2 to 6 weeks before you can go back. After surgery and rehab, you will probably have less pain. Your knee should be stronger. You should be able to use your knee and leg better. Some people have to avoid lifting heavy objects. Follow-up care is a key part of your treatment and safety. Be sure to make and go to all appointments, and call your doctor if you are having problems. It's also a good idea to know your test results and keep a list of the medicines you take. What happens before surgery?   Surgery can be stressful. This information will help you understand what you can expect. And it will help you safely prepare for surgery.   Preparing for surgery    · Understand exactly what surgery is planned, along with the risks, benefits, and other options.     · Tell your doctors ALL the medicines, vitamins, supplements, and herbal doctor uses a lighted tube called an arthroscope, or scope. He or she puts the scope and other surgical tools through small cuts in your knee. These cuts are called incisions. They leave scars that usually fade with time. The surgery will take at least 1 hour. Most people go home the same day of the surgery. You may have to use crutches after surgery. If so, be sure you have a backpack or clothes with a lot of pockets to carry items. How soon you can go back to work and your normal routine depends on your job and the type of surgery you had. If you sit at work, you may be able to go back in 1 to 2 weeks. But if you are on your feet at work, it may take 4 to 6 weeks. If you are very physically active in your job, it may take 3 to 6 months. You may need physical rehabilitation (rehab) after surgery. The program may last for several months. At first, your physical therapist will work with you. Later, you will get exercises to do on your own. After surgery and rehab, you are likely to have less pain and more flexibility in your knee. How soon you can return to sports or exercise depends on how well you follow your rehab program and how well your knee heals. If you had a partial meniscectomy, you might be able to play sports in about 1 to 2 months. If you had meniscus repair, it may be 3 to 6 months before you can play sports. Follow-up care is a key part of your treatment and safety. Be sure to make and go to all appointments, and call your doctor if you are having problems. It's also a good idea to know your test results and keep a list of the medicines you take. What happens before surgery?   Surgery can be stressful. This information will help you understand what you can expect. And it will help you safely prepare for surgery.   Preparing for surgery    · Understand exactly what surgery is planned, along with the risks, benefits, and other options.     · Tell your doctors ALL the medicines, vitamins,

## 2020-06-24 ENCOUNTER — OFFICE VISIT (OUTPATIENT)
Dept: ORTHOPEDIC SURGERY | Age: 45
End: 2020-06-24
Payer: COMMERCIAL

## 2020-06-24 VITALS — TEMPERATURE: 98 F | BODY MASS INDEX: 36.1 KG/M2 | HEIGHT: 67 IN | WEIGHT: 230 LBS

## 2020-06-24 PROCEDURE — 99214 OFFICE O/P EST MOD 30 MIN: CPT | Performed by: ORTHOPAEDIC SURGERY

## 2020-07-15 ENCOUNTER — HOSPITAL ENCOUNTER (OUTPATIENT)
Age: 45
Discharge: HOME | End: 2020-07-17
Payer: COMMERCIAL

## 2020-07-15 ENCOUNTER — HOSPITAL ENCOUNTER (OUTPATIENT)
Age: 45
Discharge: HOME | End: 2020-07-15
Payer: COMMERCIAL

## 2020-07-15 LAB
ANION GAP SERPL CALCULATED.3IONS-SCNC: 11 MMOL/L (ref 7–16)
BUN BLDV-MCNC: 14 MG/DL (ref 6–20)
CALCIUM SERPL-MCNC: 8.9 MG/DL (ref 8.6–10.2)
CHLORIDE BLD-SCNC: 100 MMOL/L (ref 98–107)
CO2: 28 MMOL/L (ref 22–29)
CREAT SERPL-MCNC: 1 MG/DL (ref 0.5–1)
GFR AFRICAN AMERICAN: >60
GFR NON-AFRICAN AMERICAN: >60 ML/MIN/1.73
GLUCOSE BLD-MCNC: 140 MG/DL (ref 74–99)
HCT VFR BLD CALC: 34.9 % (ref 34–48)
HEMOGLOBIN: 10.1 G/DL (ref 11.5–15.5)
MCH RBC QN AUTO: 21.4 PG (ref 26–35)
MCHC RBC AUTO-ENTMCNC: 28.9 % (ref 32–34.5)
MCV RBC AUTO: 74.1 FL (ref 80–99.9)
PDW BLD-RTO: 18.6 FL (ref 11.5–15)
PLATELET # BLD: 424 E9/L (ref 130–450)
PMV BLD AUTO: 9.4 FL (ref 7–12)
POTASSIUM SERPL-SCNC: 4 MMOL/L (ref 3.5–5)
RBC # BLD: 4.71 E12/L (ref 3.5–5.5)
SODIUM BLD-SCNC: 139 MMOL/L (ref 132–146)
WBC # BLD: 10 E9/L (ref 4.5–11.5)

## 2020-07-15 PROCEDURE — 80048 BASIC METABOLIC PNL TOTAL CA: CPT

## 2020-07-15 PROCEDURE — 85027 COMPLETE CBC AUTOMATED: CPT

## 2020-07-15 PROCEDURE — U0003 INFECTIOUS AGENT DETECTION BY NUCLEIC ACID (DNA OR RNA); SEVERE ACUTE RESPIRATORY SYNDROME CORONAVIRUS 2 (SARS-COV-2) (CORONAVIRUS DISEASE [COVID-19]), AMPLIFIED PROBE TECHNIQUE, MAKING USE OF HIGH THROUGHPUT TECHNOLOGIES AS DESCRIBED BY CMS-2020-01-R: HCPCS

## 2020-07-15 PROCEDURE — 36415 COLL VENOUS BLD VENIPUNCTURE: CPT

## 2020-07-16 ENCOUNTER — HOSPITAL ENCOUNTER (OUTPATIENT)
Age: 45
Discharge: HOME | End: 2020-07-16
Payer: COMMERCIAL

## 2020-07-16 PROCEDURE — 93005 ELECTROCARDIOGRAM TRACING: CPT | Performed by: ANESTHESIOLOGY

## 2020-07-17 LAB
EKG ATRIAL RATE: 70 BPM
EKG P AXIS: 47 DEGREES
EKG P-R INTERVAL: 152 MS
EKG Q-T INTERVAL: 398 MS
EKG QRS DURATION: 96 MS
EKG QTC CALCULATION (BAZETT): 429 MS
EKG R AXIS: 29 DEGREES
EKG T AXIS: 111 DEGREES
EKG VENTRICULAR RATE: 70 BPM
SARS-COV-2: NOT DETECTED
SOURCE: NORMAL

## 2020-08-25 ENCOUNTER — APPOINTMENT (OUTPATIENT)
Dept: CT IMAGING | Age: 45
End: 2020-08-25

## 2020-08-25 ENCOUNTER — HOSPITAL ENCOUNTER (EMERGENCY)
Age: 45
Discharge: HOME | End: 2020-08-25
Attending: EMERGENCY MEDICINE

## 2020-08-25 VITALS
TEMPERATURE: 97.1 F | HEART RATE: 78 BPM | HEIGHT: 67 IN | DIASTOLIC BLOOD PRESSURE: 78 MMHG | WEIGHT: 240 LBS | OXYGEN SATURATION: 98 % | BODY MASS INDEX: 37.67 KG/M2 | RESPIRATION RATE: 18 BRPM | SYSTOLIC BLOOD PRESSURE: 132 MMHG

## 2020-08-25 LAB
ALBUMIN SERPL-MCNC: 3.9 G/DL (ref 3.5–5.2)
ALP BLD-CCNC: 102 U/L (ref 35–104)
ALT SERPL-CCNC: 16 U/L (ref 0–32)
ANION GAP SERPL CALCULATED.3IONS-SCNC: 13 MMOL/L (ref 7–16)
AST SERPL-CCNC: 24 U/L (ref 0–31)
BILIRUB SERPL-MCNC: 0.2 MG/DL (ref 0–1.2)
BUN BLDV-MCNC: 16 MG/DL (ref 6–20)
CALCIUM SERPL-MCNC: 9.7 MG/DL (ref 8.6–10.2)
CHLORIDE BLD-SCNC: 103 MMOL/L (ref 98–107)
CO2: 22 MMOL/L (ref 22–29)
CREAT SERPL-MCNC: 0.8 MG/DL (ref 0.5–1)
GFR AFRICAN AMERICAN: >60
GFR NON-AFRICAN AMERICAN: >60 ML/MIN/1.73
GLUCOSE BLD-MCNC: 116 MG/DL (ref 74–99)
HCT VFR BLD CALC: 41.8 % (ref 34–48)
HEMOGLOBIN: 12.5 G/DL (ref 11.5–15.5)
MCH RBC QN AUTO: 23.1 PG (ref 26–35)
MCHC RBC AUTO-ENTMCNC: 29.9 % (ref 32–34.5)
MCV RBC AUTO: 77.1 FL (ref 80–99.9)
PDW BLD-RTO: 24.4 FL (ref 11.5–15)
PLATELET # BLD: 396 E9/L (ref 130–450)
PMV BLD AUTO: 9.4 FL (ref 7–12)
POTASSIUM SERPL-SCNC: 4.3 MMOL/L (ref 3.5–5)
PRO-BNP: 633 PG/ML (ref 0–125)
RBC # BLD: 5.42 E12/L (ref 3.5–5.5)
SODIUM BLD-SCNC: 138 MMOL/L (ref 132–146)
TOTAL PROTEIN: 7 G/DL (ref 6.4–8.3)
TROPONIN: <0.01 NG/ML (ref 0–0.03)
TROPONIN: <0.01 NG/ML (ref 0–0.03)
WBC # BLD: 10.4 E9/L (ref 4.5–11.5)

## 2020-08-25 PROCEDURE — 71275 CT ANGIOGRAPHY CHEST: CPT

## 2020-08-25 PROCEDURE — 6360000004 HC RX CONTRAST MEDICATION: Performed by: RADIOLOGY

## 2020-08-25 PROCEDURE — 85027 COMPLETE CBC AUTOMATED: CPT

## 2020-08-25 PROCEDURE — 83880 ASSAY OF NATRIURETIC PEPTIDE: CPT

## 2020-08-25 PROCEDURE — 6370000000 HC RX 637 (ALT 250 FOR IP): Performed by: EMERGENCY MEDICINE

## 2020-08-25 PROCEDURE — 36415 COLL VENOUS BLD VENIPUNCTURE: CPT

## 2020-08-25 PROCEDURE — 99285 EMERGENCY DEPT VISIT HI MDM: CPT

## 2020-08-25 PROCEDURE — 96375 TX/PRO/DX INJ NEW DRUG ADDON: CPT

## 2020-08-25 PROCEDURE — 96374 THER/PROPH/DIAG INJ IV PUSH: CPT

## 2020-08-25 PROCEDURE — 84484 ASSAY OF TROPONIN QUANT: CPT

## 2020-08-25 PROCEDURE — 99284 EMERGENCY DEPT VISIT MOD MDM: CPT

## 2020-08-25 PROCEDURE — 6360000002 HC RX W HCPCS: Performed by: EMERGENCY MEDICINE

## 2020-08-25 PROCEDURE — 93005 ELECTROCARDIOGRAM TRACING: CPT | Performed by: EMERGENCY MEDICINE

## 2020-08-25 PROCEDURE — 80053 COMPREHEN METABOLIC PANEL: CPT

## 2020-08-25 RX ORDER — NITROGLYCERIN 0.4 MG/1
0.4 TABLET SUBLINGUAL EVERY 5 MIN PRN
Status: DISCONTINUED | OUTPATIENT
Start: 2020-08-25 | End: 2020-08-26 | Stop reason: HOSPADM

## 2020-08-25 RX ORDER — LORAZEPAM 2 MG/ML
1 INJECTION INTRAMUSCULAR ONCE
Status: COMPLETED | OUTPATIENT
Start: 2020-08-25 | End: 2020-08-25

## 2020-08-25 RX ORDER — KETOROLAC TROMETHAMINE 30 MG/ML
30 INJECTION, SOLUTION INTRAMUSCULAR; INTRAVENOUS ONCE
Status: COMPLETED | OUTPATIENT
Start: 2020-08-25 | End: 2020-08-25

## 2020-08-25 RX ADMIN — LORAZEPAM 1 MG: 2 INJECTION INTRAMUSCULAR; INTRAVENOUS at 21:30

## 2020-08-25 RX ADMIN — KETOROLAC TROMETHAMINE 30 MG: 30 INJECTION, SOLUTION INTRAMUSCULAR at 19:48

## 2020-08-25 RX ADMIN — NITROGLYCERIN 0.4 MG: 0.4 TABLET, ORALLY DISINTEGRATING SUBLINGUAL at 19:33

## 2020-08-25 RX ADMIN — IOPAMIDOL 75 ML: 755 INJECTION, SOLUTION INTRAVENOUS at 20:20

## 2020-08-25 ASSESSMENT — ENCOUNTER SYMPTOMS
CHEST TIGHTNESS: 0
ABDOMINAL PAIN: 0
CHOKING: 0
SHORTNESS OF BREATH: 0

## 2020-08-25 ASSESSMENT — PAIN DESCRIPTION - LOCATION: LOCATION: CHEST

## 2020-08-25 ASSESSMENT — PAIN SCALES - GENERAL
PAINLEVEL_OUTOF10: 5
PAINLEVEL_OUTOF10: 5

## 2020-08-25 NOTE — ED PROVIDER NOTES
43-year-old female presenting with 10 days of chest discomfort. Feels like today the pain got worse with radiation of the left arm. History of what she describes as some bowel problems from a WILLIAM done many years ago at an outside hospital.  No known coronary artery disease, hypertensive upon arrival, no hypoxia, no tachypnea or respiratory distress. She states that she feels a grabbing type sensation in her chest.  Was given aspirin and nitroglycerin prior to arrival.  Says that the nitroglycerin did help her to feel better. Timing is been ongoing for a couple of weeks, quality is a squeezing type discomfort, severity was mild and intermittent but now is worsening today, duration is as described, no associated hypoxia or respiratory distress. So far nitro improves it but other makes it worse. Family History   Problem Relation Age of Onset    Diabetes Mother     Stroke Mother     Heart Disease Mother     Cancer Father     Heart Disease Father      Past Surgical History:   Procedure Laterality Date    APPENDECTOMY      CHOLECYSTECTOMY      KNEE ARTHROSCOPY Left 09/15/2017    left knee arthroscopy with chondroplasty, synovectomy and lateral meniscectomy    TONSILLECTOMY         Review of Systems   Constitutional: Negative for chills and fever. HENT: Negative. Respiratory: Negative for choking, chest tightness and shortness of breath. Cardiovascular: Positive for chest pain. Gastrointestinal: Negative for abdominal pain. All other systems reviewed and are negative. Physical Exam  Constitutional:       General: She is not in acute distress. Appearance: She is well-developed. HENT:      Head: Normocephalic and atraumatic. Eyes:      Conjunctiva/sclera: Conjunctivae normal.      Pupils: Pupils are equal, round, and reactive to light. Neck:      Musculoskeletal: Normal range of motion. Thyroid: No thyromegaly.    Cardiovascular:      Rate and Rhythm: Normal rate and regular rhythm. Pulmonary:      Effort: Pulmonary effort is normal. No respiratory distress. Breath sounds: Normal breath sounds. Abdominal:      General: There is no distension. Palpations: Abdomen is soft. Tenderness: There is no abdominal tenderness. There is no guarding or rebound. Musculoskeletal: Normal range of motion. General: No tenderness. Skin:     General: Skin is warm and dry. Findings: No erythema. Neurological:      Mental Status: She is alert and oriented to person, place, and time. Cranial Nerves: No cranial nerve deficit. Coordination: Coordination normal.          Procedures     MDM     ED Course as of Aug 27 2234   Tue Aug 25, 2020   2106 Sinus rhythm, rate of 82, normal axis, no ST elevation or depressions, no T wave abnormalities, prior EKG reviewed of about 1 month ago, questionable T wave flattening with inversions in the lateral leads then. These have resolved and are no longer present.    [SO]   2200 Patient feeling better after the Ativan. Updated regarding the results of her evaluation, understands return to the ED for any problems or worsening. We will follow-up with her family doctor.    [SO]      ED Course User Index  [SO] Cathy Lyons DO        ED Course as of Aug 27 2234   Tue Aug 25, 2020   2106 Sinus rhythm, rate of 82, normal axis, no ST elevation or depressions, no T wave abnormalities, prior EKG reviewed of about 1 month ago, questionable T wave flattening with inversions in the lateral leads then. These have resolved and are no longer present.    [SO]   2200 Patient feeling better after the Ativan. Updated regarding the results of her evaluation, understands return to the ED for any problems or worsening.   We will follow-up with her family doctor.    [SO]      ED Course User Index  [SO] Cathy Lyons, DO       --------------------------------------------- PAST HISTORY ---------------------------------------------  Past Medical History:  has a past medical history of Arthritis, Cerebral artery occlusion with cerebral infarction ([facility] [address] Depression, Factor VIII deficiency ([facility] [address] H. pylori infection, Hx of blood clots, Hx of cardiovascular stress test, Hyperlipidemia, Hypertension, and TIA (transient ischemic attack). Past Surgical History:  has a past surgical history that includes Appendectomy; Cholecystectomy; Tonsillectomy; and Knee arthroscopy (Left, 09/15/2017). Social History:  reports that she has been smoking cigarettes. She has a 16.50 pack-year smoking history. She has never used smokeless tobacco. She reports that she does not drink alcohol or use drugs. Family History: family history includes Cancer in her father; Diabetes in her mother; Heart Disease in her father and mother; Stroke in her mother. The patients home medications have been reviewed. Allergies: Amlodipine; Azithromycin; Codeine;  Corn oil; Erythromycin; Pcn [penicillins]; and Sulfa antibiotics    -------------------------------------------------- RESULTS -------------------------------------------------  Labs:  Results for orders placed or performed during the hospital encounter of 08/25/20   CBC   Result Value Ref Range    WBC 10.4 4.5 - 11.5 E9/L    RBC 5.42 3.50 - 5.50 E12/L    Hemoglobin 12.5 11.5 - 15.5 g/dL    Hematocrit 41.8 34.0 - 48.0 %    MCV 77.1 (L) 80.0 - 99.9 fL    MCH 23.1 (L) 26.0 - 35.0 pg    MCHC 29.9 (L) 32.0 - 34.5 %    RDW 24.4 (H) 11.5 - 15.0 fL    Platelets 030 578 - 026 E9/L    MPV 9.4 7.0 - 12.0 fL   Comprehensive metabolic panel   Result Value Ref Range    Sodium 138 132 - 146 mmol/L    Potassium 4.3 3.5 - 5.0 mmol/L    Chloride 103 98 - 107 mmol/L    CO2 22 22 - 29 mmol/L    Anion Gap 13 7 - 16 mmol/L    Glucose 116 (H) 74 - 99 mg/dL    BUN 16 6 - 20 mg/dL    CREATININE 0.8 0.5 - 1.0 mg/dL    GFR Non-African American >60 >=60 mL/min/1.73    GFR African American >60     Calcium 9.7 8.6 - 10.2 mg/dL    Total Protein 7.0 6.4 - 8.3 g/dL    Alb 3.9 3.5 - 5.2 g/dL    Total Bilirubin 0.2 0.0 - 1.2 mg/dL    Alkaline Phosphatase 102 35 - 104 U/L    ALT 16 0 - 32 U/L    AST 24 0 - 31 U/L   Troponin   Result Value Ref Range    Troponin <0.01 0.00 - 0.03 ng/mL   Brain Natriuretic Peptide   Result Value Ref Range    Pro- (H) 0 - 125 pg/mL   Troponin   Result Value Ref Range    Troponin <0.01 0.00 - 0.03 ng/mL   EKG 12 Lead   Result Value Ref Range    Ventricular Rate 82 BPM    Atrial Rate 82 BPM    P-R Interval 132 ms    QRS Duration 94 ms    Q-T Interval 374 ms    QTc Calculation (Bazett) 436 ms    P Axis 8 degrees    R Axis -7 degrees    T Axis 63 degrees       Radiology:  CTA CHEST W CONTRAST   Final Result            Patient MRN:  [id number]   : 1975   Age: 39 years   Gender: Female      Order Date:  2020 7:58 PM      EXAM: CTA CHEST W CONTRAST      COMPARISON: Correlation made with prior from 2010      INDICATION:  cp, ro PE    cp, ro PE       TECHNIQUE: Axial thin section CT imaging was obtained from the apices   of the lungs through the lung bases following a rapid bolus   administration of IV contrast. Coronal and sagittal MIP images were   obtained for aid in interpretation. No 3D imaging performed. Low-dose   CT acquisition technique included one of the following options; 1. Automated exposure control, 2. Adjustment of mA and/or kV according to   the patient's size or 3. Use of iterative reconstruction. FINDINGS:   Limited examination due to suboptimal timing of contrast. Repeat   imaging was performed. There is no large central pulmonary embolism. Segmental and subsegmental pulmonary arteries are not optimally   evaluated. The heart is normal in size. No pericardial effusion. There   are no airspace opacities or pleural effusion. There is no   pneumothorax. No mediastinal or hilar lymphadenopathy. No axillary   lymphadenopathy.  View of the upper abdomen shows normal bilateral   adrenal cecy. IMPRESSION:      1. Limited examination due to suboptimal timing of contrast. No large   central pulmonary embolism. Segmental and subsegmental pulmonary   arteries are not assessed on this examination. If clinical concern   persists for pulmonary embolism, ventilation perfusion lung scan may   be helpful for further evaluation. 2. No pneumonia or pleural effusion.          ------------------------- NURSING NOTES AND VITALS REVIEWED ---------------------------  Date / Time Roomed:  8/25/2020  4:07 PM  ED Bed Assignment:  01/01    The nursing notes within the ED encounter and vital signs as below have been reviewed. /78   Pulse 78   Temp 97.1 °F (36.2 °C) (Temporal)   Resp 18   Ht 5' 7\" (1.702 m)   Wt 240 lb (108.9 kg)   SpO2 98%   BMI 37.59 kg/m²   Oxygen Saturation Interpretation: Normal      ------------------------------------------ PROGRESS NOTES ------------------------------------------  I have spoken with the patient and discussed todays results, in addition to providing specific details for the plan of care and counseling regarding the diagnosis and prognosis. Their questions are answered at this time and they are agreeable with the plan. I discussed at length with them reasons for immediate return here for re evaluation. They will followup with primary care by calling their office tomorrow. Patient says she is feeling so much better at this point. She did receive maybe little bit of improvement with the nitroglycerin but when she was given the Ativan she states that she felt completely better at this point. She understands return to the ED for any problems or any worsening.   Otherwise she is to follow-up with her family doctor and other specialist as well.    --------------------------------- ADDITIONAL PROVIDER NOTES ---------------------------------  At this time the patient is without objective evidence of an acute process requiring hospitalization or inpatient management. They have remained hemodynamically stable throughout their entire ED visit and are stable for discharge with outpatient follow-up. The plan has been discussed in detail and they are aware of the specific conditions for emergent return, as well as the importance of follow-up. Discharge Medication List as of 8/25/2020 10:07 PM          Diagnosis:  1. Chest pain, unspecified type        Disposition:  Patient's disposition: Discharge to home  Patient's condition is stable.          Kisha Grissom DO  08/27/20 2236

## 2020-08-26 LAB
EKG ATRIAL RATE: 82 BPM
EKG P AXIS: 8 DEGREES
EKG P-R INTERVAL: 132 MS
EKG Q-T INTERVAL: 374 MS
EKG QRS DURATION: 94 MS
EKG QTC CALCULATION (BAZETT): 436 MS
EKG R AXIS: -7 DEGREES
EKG T AXIS: 63 DEGREES
EKG VENTRICULAR RATE: 82 BPM

## 2020-08-26 PROCEDURE — 93010 ELECTROCARDIOGRAM REPORT: CPT | Performed by: INTERNAL MEDICINE

## 2020-09-10 ENCOUNTER — TELEPHONE (OUTPATIENT)
Dept: ORTHOPEDIC SURGERY | Age: 45
End: 2020-09-10

## 2020-12-23 ENCOUNTER — TELEPHONE (OUTPATIENT)
Dept: ORTHOPEDIC SURGERY | Age: 45
End: 2020-12-23

## 2020-12-23 NOTE — TELEPHONE ENCOUNTER
Patient called in wanting another extension on her [employer]. I stated to her that we have not seen her since 06- and she needs to make an appointment and that I would not do an extension until seen in the office. Patient was approved for surgery but I tried multiple Physicians for Cardiac clearance and they do except [employer]. I left it in her hands to find a Cardiac Physician and I have not heard back from her until now because a [employer] is due. If she can not find a Cardiac Physician and  if she has a secondary insurance we might need to use it in order to get cardiac clearance. Once she shows up for her appointment another [employer] 15 will be completed.

## 2020-12-29 ENCOUNTER — OFFICE VISIT (OUTPATIENT)
Dept: ORTHOPEDIC SURGERY | Age: 45
End: 2020-12-29
Payer: COMMERCIAL

## 2020-12-29 VITALS — WEIGHT: 240 LBS | TEMPERATURE: 98 F | BODY MASS INDEX: 37.67 KG/M2 | HEIGHT: 67 IN

## 2020-12-29 PROCEDURE — 99214 OFFICE O/P EST MOD 30 MIN: CPT | Performed by: ORTHOPAEDIC SURGERY

## 2020-12-29 NOTE — PROGRESS NOTES
Chief Complaint   Patient presents with    Knee Pain     left knee pain still in alot of pain using ice heat advil and nothing is helping         HPI:    Patient is 39 y.o. female complaining of a noncardiac twisting injury while at work 3 days prior. 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 Patient continued to have pain and swelling that moment. She admits to stiffness, deep, aching pain, swelling, difficulty with stairs and ambulating far distances. She admits to gross instability specifically in her right knee. Previous treatments include brace, ice, crutches. Returns for follow up stating still having knee issues and connot find cardiac or pulmonary care coverage. ROS:    Skin: (-) rash,(-) psoriasis,(-) eczema, (-)skin cancer. Neurologic: (-)numbness, (-)tingling, (-)headaches, (-) LOC. Cardiovascular: (-) Chest pain, (-) swelling in legs/feet, (-) SOB, (-) cramping in legs/feet with walking. All other review of systems negative except stated above or in HPI      Past Medical History:   Diagnosis Date    Arthritis     [facility]bral [facility] occlusion [facility] infarction [address] [address]     Depression     Factor VIII deficiency ([facility] [address]     H. pylori infection 2018    per pt.  Hx of blood clots 2014    caratid artery blood clot was on coumadin approx 1 yr    Hx of cardiovascular stress test 10/7/2015    lexiscan    Hyperlipidemia     mildly elevated    Hypertension     TIA (transient ischemic attack)     x2  last one 2014  no deficits       Past Surgical History:   Procedure Laterality Date    APPENDECTOMY      CHOLECYSTECTOMY      KNEE ARTHROSCOPY Left 09/15/2017    left knee arthroscopy with chondroplasty, synovectomy and lateral meniscectomy    TONSILLECTOMY       No current outpatient medications on file.   Allergies   Allergen Reactions    Amlodipine Swelling     Leg swelling    Azithromycin Hives    Codeine Hives    Corn Oil Other (See Comments)     Causes deafness    Erythromycin Hives    Pcn [Penicillins] Hives    Sulfa Antibiotics Hives     Social History     Socioeconomic History    Marital status:      Spouse name: Not on file    Number of children: Not on file    Years of education: Not on file    Highest education level: Not on file   Occupational History    Not on file   Social Needs    Financial resource strain: Not on file    Food insecurity     Worry: Not on file     Inability: Not on file    Transportation needs     Medical: Not on file     Non-medical: Not on file   Tobacco Use    Smoking status: Current Every Day Smoker     Packs/day: 0.75     Years: 22.00     Pack years: 16.50     Types: Cigarettes    Smokeless tobacco: Never Used   Substance and Sexual Activity    Alcohol use: No    Drug use: No    Sexual activity: Never   Lifestyle    Physical activity     Days per week: Not on file     Minutes per session: Not on file    Stress: Not on file   Relationships    Social connections     Talks on phone: Not on file     Gets together: Not on file     Attends Cheondoism service: Not on file     Active member of club or organization: Not on file     Attends meetings of clubs or organizations: Not on file     Relationship status: Not on file    Intimate partner violence     Fear of current or ex partner: Not on file     Emotionally abused: Not on file     Physically abused: Not on file     Forced sexual activity: Not on file   Other Topics Concern    Not on file   Social History Narrative    Not on file     Family History   Problem Relation Age of Onset    Diabetes Mother     Stroke Mother     Heart Disease Mother     Cancer Father     Heart Disease Father            Physical Exam:    Temp 98 °F (36.7 °C)   Ht 5' 7\" (1.702 m)   Wt 240 lb (108.9 kg)   BMI 37.59 kg/m²     GENERAL: alert, appears stated age, cooperative, no acute distress    HEENT: Head is normocephalic, atraumatic. PERRLA. SKIN: Clean, dry, intact. There is not any cellulitis or cutaneous lesions noted in the lower extremities except noted below in MSK    PULMONARY: breathing is regular and unlabored, no acute distress    CV: The bilateral upper and lower extremities are warm and well-perfused with brisk capillary refill. 2+ pulses UE and LE bilateral.     PSYCHIATRY: Pleasant mood, appropriate behavior, follows commands    NEURO: Sensation is intact distally with light touch with no alteration. Motor exam of the lower extremities show quadriceps, hamstrings, foot dorsiflexion and plantarflexion grossly intact 5/5. LYMPH: No lymphedema present distally in upper or lower extremity. MUSCULOSKELETAL:    Left knee Exam:    mild effusion noted. No erythema/induration/fluctuance. Posterior medial joint line TTP. Stable to varus and valgus at 0 and 30 degrees of flexion. Negative Lachman's and posterior drawer. Negative patellar grind test and J sign. Compartments soft and compressible throughout leg. Active range of motion 0-120 with pain. Positive Kenisha's Positive Apley's, gait is antalgic        Imaging:  Xr Knee Left (1-2 Views)    Result Date: 12/11/2019  Location:100 Exam: XR KNEE LEFT (1-2 VIEWS) History:  Knee gave out while moving patient. Left knee pain. Unable to bear weight or bend knee Findings: Comparison: Previous study dated 5/1/2017 was reviewed. . 2 views of the left knee were obtained. No acute fracture or dislocation is seen. No radiopaque foreign body or abnormal soft tissue calcification is seen. No suprapatellar joint effusion is identified. No acute fracture or dislocation. If clinical suspicion of an acute fracture persists, 7 to 10 day followup films may be obtained     Mri Knee Left Wo Contrast    Result Date: 12/27/2019  LOCATION: 200 EXAM: MRI KNEE LEFT WO CONTRAST COMPARISON: None. HISTORY: Knee pain.  TECHNIQUE: Routine multiplanar multisequence imaging was performed of the left knee. No contrast was administered. FINDINGS: There is partial-thickness cartilage loss in the medial compartment with a large complex type tear of the body posterior horn medial meniscus. The dominant component is a horizontal tear extending to the undersurface. This appears to extend into the root although no radial tear is seen. No significant extrusion is identified. The lateral meniscus appears normal. The ACL and PCL are intact. The collateral ligament complexes are normal. There is subchondral reactive marrow involving the medial tibial plateau anteriorly. There is partial-thickness cartilage loss in the anterior compartment. 1. Large tear of the body posterior horn medial meniscus as detailed. 2. Probable contusion versus reactive marrow involving the medial tibial plateau. 3. 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 Ivette Zaman was seen today for knee pain. Diagnoses and all orders for this visit:    Other tear of medial meniscus of left knee as current injury, subsequent encounter        Patient seen and examined. X-rays reviewed. Patient has little to no arthritis noted on x-rays today. However patient's main complaint is instability of symptomatic knee. Exam and history is consistent with possible meniscus tear. MRI recommended for further evaluation management and we will ask for additional allowances for this. Follow-up after MRI    Patient seen and examined. MRI reviewed with patient in detail. Natural history and course discussed with patient in long discussion  Treatment options discussed with patient in detail including risks and benefits. The risks and benefits of a knee arthroscopy were discussed with the patient. The risks are including but not limited to: infection, injuries to blood vessels and nerves, non relief of symptoms, arthrofibrosis of knee, need for further operative intervention, blood loss, PE/DVT, MI and death.   The risks are understood by the patient and they wish to proceed with knee arthroscopy, partial menisectomy and debridement. C9 will be submitted     In a 15 minute assessment and discussion, patient was counseled on continued weight loss, diet, and physical activity relating to this condition. She was educated with options in detail including nutrition, joining a health club/ weight loss program, and use of cardio equipment such as the Arc Trainer and the importance of use as well as range of motion and HEP exercises for weight loss. Patient educated about the healing rates with this condition. Patient does smoke and does have secondhand smoke exposure. In this discussion of approximately 5 minutes, patient was counseled about decrease in smoking exposure regards to healing and overall good health. Patient seems reluctant but is willing to listen to the discussion in detail. She states that she will try to cut down as much as possible and states that she will try to quit completely by the next visit. Melina Bello DO        25 minutes was spent with patient. 50% or greater was spent counseling the patient.

## 2021-03-31 ENCOUNTER — OFFICE VISIT (OUTPATIENT)
Dept: ORTHOPEDIC SURGERY | Age: 46
End: 2021-03-31
Payer: COMMERCIAL

## 2021-03-31 VITALS — HEIGHT: 67 IN | TEMPERATURE: 98 F | WEIGHT: 240 LBS | BODY MASS INDEX: 37.67 KG/M2

## 2021-03-31 DIAGNOSIS — S83.242D OTHER TEAR OF MEDIAL MENISCUS OF LEFT KNEE AS CURRENT INJURY, SUBSEQUENT ENCOUNTER: Primary | ICD-10-CM

## 2021-03-31 PROCEDURE — 99214 OFFICE O/P EST MOD 30 MIN: CPT | Performed by: ORTHOPAEDIC SURGERY

## 2021-03-31 NOTE — PROGRESS NOTES
Chief Complaint   Patient presents with    Knee Pain     left knee keeps swelling up taking ibproufen. not helping at all         HPI:    Patient is 39 y.o. female complaining of a noncardiac twisting injury while at work 3 days prior. 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 Patient continued to have pain and swelling that moment. She admits to stiffness, deep, aching pain, swelling, difficulty with stairs and ambulating far distances. She admits to gross instability specifically in her right knee. Previous treatments include brace, ice, crutches. Returns for follow up stating still having knee issues and connot find cardiac or pulmonary care coverage. ROS:    Skin: (-) rash,(-) psoriasis,(-) eczema, (-)skin cancer. Neurologic: (-)numbness, (-)tingling, (-)headaches, (-) LOC. Cardiovascular: (-) Chest pain, (-) swelling in legs/feet, (-) SOB, (-) cramping in legs/feet with walking. All other review of systems negative except stated above or in HPI      Past Medical History:   Diagnosis Date    Arthritis     [facility]bral [facility] occlusion [facility] infarction ([facility] [address]     Depression     Factor VIII deficiency ([facility] [address]     H. pylori infection 2018    per pt.  Hx of blood clots 2014    caratid artery blood clot was on coumadin approx 1 yr    Hx of cardiovascular stress test 10/7/2015    lexiscan    Hyperlipidemia     mildly elevated    Hypertension     TIA (transient ischemic attack)     x2  last one 2014  no deficits       Past Surgical History:   Procedure Laterality Date    APPENDECTOMY      CHOLECYSTECTOMY      KNEE ARTHROSCOPY Left 09/15/2017    left knee arthroscopy with chondroplasty, synovectomy and lateral meniscectomy    TONSILLECTOMY       No current outpatient medications on file.   Allergies   Allergen Reactions    Amlodipine Swelling     Leg swelling    Azithromycin Hives    Codeine Hives    Corn Oil Other (See Comments)     Causes deafness    Erythromycin Hives    Pcn [Penicillins] Hives    Sulfa Antibiotics Hives     Social History     Socioeconomic History    Marital status:      Spouse name: Not on file    Number of children: Not on file    Years of education: Not on file    Highest education level: Not on file   Occupational History    Not on file   Social Needs    Financial resource strain: Not on file    Food insecurity     Worry: Not on file     Inability: Not on file    Transportation needs     Medical: Not on file     Non-medical: Not on file   Tobacco Use    Smoking status: Current Every Day Smoker     Packs/day: 0.75     Years: 22.00     Pack years: 16.50     Types: Cigarettes    Smokeless tobacco: Never Used   Substance and Sexual Activity    Alcohol use: No    Drug use: No    Sexual activity: Never   Lifestyle    Physical activity     Days per week: Not on file     Minutes per session: Not on file    Stress: Not on file   Relationships    Social connections     Talks on phone: Not on file     Gets together: Not on file     Attends Sikhism service: Not on file     Active member of club or organization: Not on file     Attends meetings of clubs or organizations: Not on file     Relationship status: Not on file    Intimate partner violence     Fear of current or ex partner: Not on file     Emotionally abused: Not on file     Physically abused: Not on file     Forced sexual activity: Not on file   Other Topics Concern    Not on file   Social History Narrative    Not on file     Family History   Problem Relation Age of Onset    Diabetes Mother     Stroke Mother     Heart Disease Mother     Cancer Father     Heart Disease Father            Physical Exam:    Temp 98 °F (36.7 °C)   Ht 5' 7\" (1.702 m)   Wt 240 lb (108.9 kg)   BMI 37.59 kg/m²     GENERAL: alert, appears stated age, cooperative, no acute distress    HEENT: Head is normocephalic, atraumatic. PERRLA. SKIN: Clean, dry, intact. There is not any cellulitis or cutaneous lesions noted in the lower extremities except noted below in MSK    PULMONARY: breathing is regular and unlabored, no acute distress    CV: The bilateral upper and lower extremities are warm and well-perfused with brisk capillary refill. 2+ pulses UE and LE bilateral.     PSYCHIATRY: Pleasant mood, appropriate behavior, follows commands    NEURO: Sensation is intact distally with light touch with no alteration. Motor exam of the lower extremities show quadriceps, hamstrings, foot dorsiflexion and plantarflexion grossly intact 5/5. LYMPH: No lymphedema present distally in upper or lower extremity. MUSCULOSKELETAL:    Left knee Exam:    mild effusion noted. No erythema/induration/fluctuance. Posterior medial joint line TTP. Stable to varus and valgus at 0 and 30 degrees of flexion. Negative Lachman's and posterior drawer. Negative patellar grind test and J sign. Compartments soft and compressible throughout leg. Active range of motion 0-120 with pain. Positive Kenisha's Positive Apley's, gait is antalgic        Imaging:  Xr Knee Left (1-2 Views)    Result Date: 12/11/2019  Location:100 Exam: XR KNEE LEFT (1-2 VIEWS) History:  Knee gave out while moving patient. Left knee pain. Unable to bear weight or bend knee Findings: Comparison: Previous study dated 5/1/2017 was reviewed. . 2 views of the left knee were obtained. No acute fracture or dislocation is seen. No radiopaque foreign body or abnormal soft tissue calcification is seen. No suprapatellar joint effusion is identified. No acute fracture or dislocation. If clinical suspicion of an acute fracture persists, 7 to 10 day followup films may be obtained     Mri Knee Left Wo Contrast    Result Date: 12/27/2019  LOCATION: 200 EXAM: MRI KNEE LEFT WO CONTRAST COMPARISON: None. HISTORY: Knee pain.  TECHNIQUE: Routine multiplanar multisequence imaging was performed of the left knee. No contrast was administered. FINDINGS: There is partial-thickness cartilage loss in the medial compartment with a large complex type tear of the body posterior horn medial meniscus. The dominant component is a horizontal tear extending to the undersurface. This appears to extend into the root although no radial tear is seen. No significant extrusion is identified. The lateral meniscus appears normal. The ACL and PCL are intact. The collateral ligament complexes are normal. There is subchondral reactive marrow involving the medial tibial plateau anteriorly. There is partial-thickness cartilage loss in the anterior compartment. 1. Large tear of the body posterior horn medial meniscus as detailed. 2. Probable contusion versus reactive marrow involving the medial tibial plateau. 3. 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 Tobias Benson was seen today for knee pain. Diagnoses and all orders for this visit:    Other tear of medial meniscus of left knee as current injury, subsequent encounter        Patient seen and examined. X-rays reviewed. Patient has little to no arthritis noted on x-rays today. However patient's main complaint is instability of symptomatic knee. Exam and history is consistent with possible meniscus tear. MRI recommended for further evaluation management and we will ask for additional allowances for this. Follow-up after MRI    Patient seen and examined. MRI reviewed with patient in detail. Natural history and course discussed with patient in long discussion  Treatment options discussed with patient in detail including risks and benefits. The risks and benefits of a knee arthroscopy were discussed with the patient. The risks are including but not limited to: infection, injuries to blood vessels and nerves, non relief of symptoms, arthrofibrosis of knee, need for further operative intervention, blood loss, PE/DVT, MI and death.   Patient verbalized understanding of the discussed procedure along with risks and benefits and wishes to proceed with Left knee arthroscopy, partial menisectomy and debridement. The patient was counseled at length about the risks of cristal Covid-19 during their perioperative period and any recovery window from their procedure. The patient was made aware that cristal Covid-19  may worsen their prognosis for recovering from their procedure  and lend to a higher morbidity and/or mortality risk. All material risks, benefits, and reasonable alternatives including postponing the procedure were discussed. The patient does wish to proceed with the procedure at this time. In a 15 minute assessment and discussion, patient was counseled on continued weight loss, diet, and physical activity relating to this condition. She was educated with options in detail including nutrition, joining a health club/ weight loss program, and use of cardio equipment such as the Arc Trainer and the importance of use as well as range of motion and HEP exercises for weight loss. Patient educated about the healing rates with this condition. Patient does smoke and does have secondhand smoke exposure. In this discussion of approximately 5 minutes, patient was counseled about decrease in smoking exposure regards to healing and overall good health. Patient seems reluctant but is willing to listen to the discussion in detail. She states that she will try to cut down as much as possible and states that she will try to quit completely by the next visit. Becka Torres DO        25 minutes was spent with patient. 50% or greater was spent counseling the patient.

## 2021-06-10 ENCOUNTER — OFFICE VISIT (OUTPATIENT)
Dept: ORTHOPEDIC SURGERY | Age: 46
End: 2021-06-10
Payer: COMMERCIAL

## 2021-06-10 VITALS — BODY MASS INDEX: 37.67 KG/M2 | WEIGHT: 240 LBS | HEIGHT: 67 IN

## 2021-06-10 DIAGNOSIS — S83.242D OTHER TEAR OF MEDIAL MENISCUS OF LEFT KNEE AS CURRENT INJURY, SUBSEQUENT ENCOUNTER: Primary | ICD-10-CM

## 2021-06-10 PROCEDURE — 99214 OFFICE O/P EST MOD 30 MIN: CPT | Performed by: ORTHOPAEDIC SURGERY

## 2021-06-10 RX ORDER — IBUPROFEN 200 MG
200 TABLET ORAL EVERY 6 HOURS PRN
COMMUNITY

## 2021-06-10 NOTE — PROGRESS NOTES
Chief Complaint   Patient presents with    Knee Pain     Left knee  follow up. Increased pain since being evaluated by antonio Cerda physician around the end of April. She is slated to have court hearing in October. HPI:    Patient is 42 y.o. female complaining of a noncardiac twisting injury while at work 3 days prior. 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 Patient continued to have pain and swelling that moment. She admits to stiffness, deep, aching pain, swelling, difficulty with stairs and ambulating far distances. She admits to gross instability specifically in her right knee. Previous treatments include brace, ice, crutches. Returns for follow up stating still having knee issues and connot find cardiac or pulmonary care coverage. She also had a recent Worker's Comp. evaluation which cause her more pain since that visit. ROS:    Skin: (-) rash,(-) psoriasis,(-) eczema, (-)skin cancer. Neurologic: (-)numbness, (-)tingling, (-)headaches, (-) LOC. Cardiovascular: (-) Chest pain, (-) swelling in legs/feet, (-) SOB, (-) cramping in legs/feet with walking. All other review of systems negative except stated above or in HPI      Past Medical History:   Diagnosis Date    Arthritis     [facility]erebral artery occlusion with [facility] infarction [address] [address]     Depression     Factor VIII deficiency ([facility] [address]     H. pylori infection 2018    per pt.     Hx of blood clots 2014    caratid artery blood clot was on coumadin approx 1 yr    Hx of cardiovascular stress test 10/7/2015    lexiscan    Hyperlipidemia     mildly elevated    Hypertension     TIA (transient ischemic attack)     x2  last one 2014  no deficits       Past Surgical History:   Procedure Laterality Date    APPENDECTOMY      CHOLECYSTECTOMY      KNEE ARTHROSCOPY Left 09/15/2017    left knee arthroscopy with chondroplasty, synovectomy and lateral meniscectomy  TONSILLECTOMY         Current Outpatient Medications:     ibuprofen (ADVIL;MOTRIN) 200 MG tablet, Take 200 mg by mouth every 6 hours as needed for Pain, Disp: , Rfl:   Allergies   Allergen Reactions    Amlodipine Swelling     Leg swelling    Azithromycin Hives    Codeine Hives    Corn Oil Other (See Comments)     Causes deafness    Erythromycin Hives    Pcn [Penicillins] Hives    Sulfa Antibiotics Hives     Social History     Socioeconomic History    Marital status:      Spouse name: Not on file    Number of children: Not on file    Years of education: Not on file    Highest education level: Not on file   Occupational History    Not on file   Tobacco Use    Smoking status: Current Every Day Smoker     Packs/day: 0.75     Years: 22.00     Pack years: 16.50     Types: Cigarettes    Smokeless tobacco: Never Used   Vaping Use    Vaping Use: Never used   Substance and Sexual Activity    Alcohol use: No    Drug use: No    Sexual activity: Never   Other Topics Concern    Not on file   Social History Narrative    Not on file     Social Determinants of Health     Financial Resource Strain:     Difficulty of Paying Living Expenses:    Food Insecurity:     Worried About Running Out of Food in the Last Year:     Ran Out of Food in the Last Year:    Transportation Needs:     Lack of Transportation (Medical):      Lack of Transportation (Non-Medical):    Physical Activity:     Days of Exercise per Week:     Minutes of Exercise per Session:    Stress:     Feeling of Stress :    Social Connections:     Frequency of Communication with Friends and Family:     Frequency of Social Gatherings with Friends and Family:     Attends Restorationist Services:     Active Member of Clubs or Organizations:     Attends Club or Organization Meetings:     Marital Status:    Intimate Partner Violence:     Fear of Current or Ex-Partner:     Emotionally Abused:     Physically Abused:     Sexually Abused: Family History   Problem Relation Age of Onset    Diabetes Mother     Stroke Mother     Heart Disease Mother     Cancer Father     Heart Disease Father            Physical Exam:    Ht 5' 7\" (1.702 m)   Wt 240 lb (108.9 kg)   BMI 37.59 kg/m²     GENERAL: alert, appears stated age, cooperative, no acute distress    HEENT: Head is normocephalic, atraumatic. PERRLA. SKIN: Clean, dry, intact. There is not any cellulitis or cutaneous lesions noted in the lower extremities except noted below in MSK    PULMONARY: breathing is regular and unlabored, no acute distress    CV: The bilateral upper and lower extremities are warm and well-perfused with brisk capillary refill. 2+ pulses UE and LE bilateral.     PSYCHIATRY: Pleasant mood, appropriate behavior, follows commands    NEURO: Sensation is intact distally with light touch with no alteration. Motor exam of the lower extremities show quadriceps, hamstrings, foot dorsiflexion and plantarflexion grossly intact 5/5. LYMPH: No lymphedema present distally in upper or lower extremity. MUSCULOSKELETAL:    Left knee Exam:    mild effusion noted. No erythema/induration/fluctuance. Posterior medial joint line TTP. Stable to varus and valgus at 0 and 30 degrees of flexion. Negative Lachman's and posterior drawer. Negative patellar grind test and J sign. Compartments soft and compressible throughout leg. Active range of motion 0-120 with pain. Positive Kenisha's Positive Apley's, gait is antalgic        Imaging:  Xr Knee Left (1-2 Views)    Result Date: 12/11/2019  Location:100 Exam: XR KNEE LEFT (1-2 VIEWS) History:  Knee gave out while moving patient. Left knee pain. Unable to bear weight or bend knee Findings: Comparison: Previous study dated 5/1/2017 was reviewed. . 2 views of the left knee were obtained. No acute fracture or dislocation is seen. No radiopaque foreign body or abnormal soft tissue calcification is seen.  No suprapatellar joint effusion is identified. No acute fracture or dislocation. If clinical suspicion of an acute fracture persists, 7 to 10 day followup films may be obtained     Mri Knee Left Wo Contrast    Result Date: 12/27/2019  LOCATION: 200 EXAM: MRI KNEE LEFT WO CONTRAST COMPARISON: None. HISTORY: Knee pain. TECHNIQUE: Routine multiplanar multisequence imaging was performed of the left knee. No contrast was administered. FINDINGS: There is partial-thickness cartilage loss in the medial compartment with a large complex type tear of the body posterior horn medial meniscus. The dominant component is a horizontal tear extending to the undersurface. This appears to extend into the root although no radial tear is seen. No significant extrusion is identified. The lateral meniscus appears normal. The ACL and PCL are intact. The collateral ligament complexes are normal. There is subchondral reactive marrow involving the medial tibial plateau anteriorly. There is partial-thickness cartilage loss in the anterior compartment. 1. Large tear of the body posterior horn medial meniscus as detailed. 2. Probable contusion versus reactive marrow involving the medial tibial plateau. 3. 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 Sree Thayer was seen today for knee pain. Diagnoses and all orders for this visit:    Other tear of medial meniscus of left knee as current injury, subsequent encounter        Patient seen and examined. X-rays reviewed. Patient has little to no arthritis noted on x-rays today. However patient's main complaint is instability of symptomatic knee. Exam and history is consistent with possible meniscus tear. MRI recommended for further evaluation management and we will ask for additional allowances for this. Follow-up after MRI    Patient seen and examined. MRI reviewed with patient in detail.   Natural history and course discussed with patient in long discussion  Treatment options discussed with patient in detail including risks and benefits. The risks and benefits of a knee arthroscopy were discussed with the patient. The risks are including but not limited to: infection, injuries to blood vessels and nerves, non relief of symptoms, arthrofibrosis of knee, need for further operative intervention, blood loss, PE/DVT, MI and death. Patient verbalized understanding of the discussed procedure along with risks and benefits and wishes to proceed with Left knee arthroscopy, partial menisectomy and debridement. The patient was counseled at length about the risks of cristal Covid-19 during their perioperative period and any recovery window from their procedure. The patient was made aware that cristal Covid-19  may worsen their prognosis for recovering from their procedure  and lend to a higher morbidity and/or mortality risk. All material risks, benefits, and reasonable alternatives including postponing the procedure were discussed. The patient does wish to proceed with the procedure at this time. In a 15 minute assessment and discussion, patient was counseled on continued weight loss, diet, and physical activity relating to this condition. She was educated with options in detail including nutrition, joining a health club/ weight loss program, and use of cardio equipment such as the Arc Trainer and the importance of use as well as range of motion and HEP exercises for weight loss. Patient educated about the healing rates with this condition. Patient does smoke and does have secondhand smoke exposure. In this discussion of approximately 5 minutes, patient was counseled about decrease in smoking exposure regards to healing and overall good health. Patient seems reluctant but is willing to listen to the discussion in detail. She states that she will try to cut down as much as possible and states that she will try to quit completely by the next visit.       Marquis Barbosa, DO        25 minutes was spent with patient. 50% or greater was spent counseling the patient.

## 2021-08-20 ENCOUNTER — OFFICE VISIT (OUTPATIENT)
Dept: ORTHOPEDIC SURGERY | Age: 46
End: 2021-08-20
Payer: COMMERCIAL

## 2021-08-20 VITALS — BODY MASS INDEX: 37.67 KG/M2 | TEMPERATURE: 98 F | WEIGHT: 240 LBS | HEIGHT: 67 IN

## 2021-08-20 DIAGNOSIS — S83.242D OTHER TEAR OF MEDIAL MENISCUS OF LEFT KNEE AS CURRENT INJURY, SUBSEQUENT ENCOUNTER: Primary | ICD-10-CM

## 2021-08-20 PROCEDURE — 99214 OFFICE O/P EST MOD 30 MIN: CPT | Performed by: ORTHOPAEDIC SURGERY

## 2021-08-20 NOTE — PROGRESS NOTES
Chief Complaint   Patient presents with    Knee Pain     left knee pain, still not doing good. HPI:    Patient is 42 y.o. female complaining of a noncardiac twisting injury while at work 3 days prior. 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 Patient continued to have pain and swelling that moment. She admits to stiffness, deep, aching pain, swelling, difficulty with stairs and ambulating far distances. She admits to gross instability specifically in her right knee. Previous treatments include brace, ice, crutches. Returns for follow up stating still having knee issues and is in the process of finding cardiac or pulmonary care coverage. She also had a recent Worker's Comp. evaluation which cause her more pain since that visit. ROS:    Skin: (-) rash,(-) psoriasis,(-) eczema, (-)skin cancer. Neurologic: (-)numbness, (-)tingling, (-)headaches, (-) LOC. Cardiovascular: (-) Chest pain, (-) swelling in legs/feet, (-) SOB, (-) cramping in legs/feet with walking. All other review of systems negative except stated above or in HPI      Past Medical History:   Diagnosis Date    Arthritis     Cerebral artery occlusion with cerebral infarction ([facility] [address]     Depression     Factor VIII deficiency ([facility] [address]     H. pylori infection 2018    per pt.     Hx of blood clots 2014    caratid artery blood clot was on coumadin approx 1 yr    Hx of cardiovascular stress test 10/7/2015    lexiscan    Hyperlipidemia     mildly elevated    Hypertension     TIA (transient ischemic attack)     x2  last one 2014  no deficits       Past Surgical History:   Procedure Laterality Date    APPENDECTOMY      CHOLECYSTECTOMY      KNEE ARTHROSCOPY Left 09/15/2017    left knee arthroscopy with chondroplasty, synovectomy and lateral meniscectomy    TONSILLECTOMY         Current Outpatient Medications:     ibuprofen (ADVIL;MOTRIN) 200 MG tablet, Take 200 mg by mouth every 6 hours as needed for Pain, Disp: , Rfl:   Allergies   Allergen Reactions    Amlodipine Swelling     Leg swelling    Azithromycin Hives    Codeine Hives    Corn Oil Other (See Comments)     Causes deafness    Erythromycin Hives    Pcn [Penicillins] Hives    Sulfa Antibiotics Hives     Social History     Socioeconomic History    Marital status:      Spouse name: Not on file    Number of children: Not on file    Years of education: Not on file    Highest education level: Not on file   Occupational History    Not on file   Tobacco Use    Smoking status: Current Every Day Smoker     Packs/day: 0.75     Years: 22.00     Pack years: 16.50     Types: Cigarettes    Smokeless tobacco: Never Used   Vaping Use    Vaping Use: Never used   Substance and Sexual Activity    Alcohol use: No    Drug use: No    Sexual activity: Never   Other Topics Concern    Not on file   Social History Narrative    Not on file     Social Determinants of Health     Financial Resource Strain:     Difficulty of Paying Living Expenses:    Food Insecurity:     Worried About Running Out of Food in the Last Year:     Ran Out of Food in the Last Year:    Transportation Needs:     Lack of Transportation (Medical):      Lack of Transportation (Non-Medical):    Physical Activity:     Days of Exercise per Week:     Minutes of Exercise per Session:    Stress:     Feeling of Stress :    Social Connections:     Frequency of Communication with Friends and Family:     Frequency of Social Gatherings with Friends and Family:     Attends Spiritism Services:     Active Member of Clubs or Organizations:     Attends Club or Organization Meetings:     Marital Status:    Intimate Partner Violence:     Fear of Current or Ex-Partner:     Emotionally Abused:     Physically Abused:     Sexually Abused:      Family History   Problem Relation Age of Onset    Diabetes Mother     Stroke Mother     Heart Disease Mother     Cancer Father     Heart Disease Father            Physical Exam:    Temp 98 °F (36.7 °C)   Ht 5' 7\" (1.702 m)   Wt 240 lb (108.9 kg)   BMI 37.59 kg/m²     GENERAL: alert, appears stated age, cooperative, no acute distress    HEENT: Head is normocephalic, atraumatic. PERRLA. SKIN: Clean, dry, intact. There is not any cellulitis or cutaneous lesions noted in the lower extremities except noted below in MSK    PULMONARY: breathing is regular and unlabored, no acute distress    CV: The bilateral upper and lower extremities are warm and well-perfused with brisk capillary refill. 2+ pulses UE and LE bilateral.     PSYCHIATRY: Pleasant mood, appropriate behavior, follows commands    NEURO: Sensation is intact distally with light touch with no alteration. Motor exam of the lower extremities show quadriceps, hamstrings, foot dorsiflexion and plantarflexion grossly intact 5/5. LYMPH: No lymphedema present distally in upper or lower extremity. MUSCULOSKELETAL:    Left knee Exam:    mild effusion noted. No erythema/induration/fluctuance. Posterior medial joint line TTP. Stable to varus and valgus at 0 and 30 degrees of flexion. Negative Lachman's and posterior drawer. Negative patellar grind test and J sign. Compartments soft and compressible throughout leg. Active range of motion 0-120 with pain. Positive Kenisha's Positive Apley's, gait is antalgic    no change since last visit. Imaging:  Xr Knee Left (1-2 Views)    Result Date: 12/11/2019  Location:100 Exam: XR KNEE LEFT (1-2 VIEWS) History:  Knee gave out while moving patient. Left knee pain. Unable to bear weight or bend knee Findings: Comparison: Previous study dated 5/1/2017 was reviewed. . 2 views of the left knee were obtained. No acute fracture or dislocation is seen. No radiopaque foreign body or abnormal soft tissue calcification is seen. No suprapatellar joint effusion is identified. No acute fracture or dislocation. If clinical suspicion of an acute fracture persists, 7 to 10 day followup films may be obtained     Mri Knee Left Wo Contrast    Result Date: 12/27/2019  LOCATION: 200 EXAM: MRI KNEE LEFT WO CONTRAST COMPARISON: None. HISTORY: Knee pain. TECHNIQUE: Routine multiplanar multisequence imaging was performed of the left knee. No contrast was administered. FINDINGS: There is partial-thickness cartilage loss in the medial compartment with a large complex type tear of the body posterior horn medial meniscus. The dominant component is a horizontal tear extending to the undersurface. This appears to extend into the root although no radial tear is seen. No significant extrusion is identified. The lateral meniscus appears normal. The ACL and PCL are intact. The collateral ligament complexes are normal. There is subchondral reactive marrow involving the medial tibial plateau anteriorly. There is partial-thickness cartilage loss in the anterior compartment. 1. Large tear of the body posterior horn medial meniscus as detailed. 2. Probable contusion versus reactive marrow involving the medial tibial plateau. 3. 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 Iam Arredondo was seen today for knee pain. Diagnoses and all orders for this visit:    Other tear of medial meniscus of left knee as current injury, subsequent encounter        Patient seen and examined. X-rays reviewed. Patient has little to no arthritis noted on x-rays today. However patient's main complaint is instability of symptomatic knee. Exam and history is consistent with possible meniscus tear. MRI recommended for further evaluation management and we will ask for additional allowances for this. Follow-up after MRI    Patient seen and examined. MRI reviewed with patient in detail. Natural history and course discussed with patient in long discussion  Treatment options discussed with patient in detail including risks and benefits.         The risks counseling the patient.

## 2022-08-01 ENCOUNTER — APPOINTMENT (OUTPATIENT)
Dept: CT IMAGING | Age: 47
End: 2022-08-01

## 2022-08-01 ENCOUNTER — HOSPITAL ENCOUNTER (EMERGENCY)
Age: 47
Discharge: HOME | End: 2022-08-01
Attending: EMERGENCY MEDICINE

## 2022-08-01 VITALS
WEIGHT: 230 LBS | BODY MASS INDEX: 36.02 KG/M2 | OXYGEN SATURATION: 97 % | TEMPERATURE: 98.8 F | SYSTOLIC BLOOD PRESSURE: 175 MMHG | RESPIRATION RATE: 20 BRPM | DIASTOLIC BLOOD PRESSURE: 105 MMHG | HEART RATE: 112 BPM

## 2022-08-01 DIAGNOSIS — H91.93 BILATERAL HEARING LOSS, UNSPECIFIED HEARING LOSS TYPE: Primary | ICD-10-CM

## 2022-08-01 PROCEDURE — 70450 CT HEAD/BRAIN W/O DYE: CPT

## 2022-08-01 PROCEDURE — 99284 EMERGENCY DEPT VISIT MOD MDM: CPT

## 2022-08-01 ASSESSMENT — ENCOUNTER SYMPTOMS
WHEEZING: 0
EYE DISCHARGE: 0
NAUSEA: 0
BACK PAIN: 0
ABDOMINAL PAIN: 0
EYE PAIN: 0
CHEST TIGHTNESS: 0
RHINORRHEA: 0
COUGH: 0
VOMITING: 0
DIARRHEA: 0
SINUS PRESSURE: 0
FACIAL SWELLING: 0
EYE REDNESS: 0
SHORTNESS OF BREATH: 0
SORE THROAT: 0

## 2022-08-01 NOTE — ED PROVIDER NOTES
Chief complaint:  Hearing loss    HPI history provided by the patient  Patient is a history at baseline of hearing loss and has hearing aids. Apparently today it is worse, she states she turned her hearing aids up and still cannot hear anything out of either ear today. No associated speech difficulty or facial droop. No headache. No confusion. No arm or leg numbness, tingling, paresthesias or weakness. No chest pain or shortness of breath. No recent trauma or head injury. Apparently she ate some corn and feels that she is allergic to that and that is what triggered it although has no rash or facial swelling. No difficulty breathing. No treatment prior to arrival.  Nothing makes her better or worse. Review of Systems   Constitutional:  Negative for chills, diaphoresis, fatigue and fever. HENT:  Positive for hearing loss. Negative for congestion, ear discharge, ear pain, facial swelling, postnasal drip, rhinorrhea, sinus pressure and sore throat. Eyes:  Negative for pain, discharge and redness. Respiratory:  Negative for cough, chest tightness, shortness of breath and wheezing. Cardiovascular:  Negative for chest pain. Gastrointestinal:  Negative for abdominal pain, diarrhea, nausea and vomiting. Genitourinary:  Negative for dysuria, flank pain and frequency. Musculoskeletal:  Negative for arthralgias, back pain, gait problem, joint swelling, myalgias, neck pain and neck stiffness. Skin:  Negative for rash and wound. Neurological:  Negative for dizziness, seizures, syncope, facial asymmetry, speech difficulty, weakness, light-headedness, numbness and headaches. All other systems reviewed and are negative. Physical Exam  Vitals and nursing note reviewed. Constitutional:       General: She is awake. She is not in acute distress. Appearance: She is well-developed. She is not ill-appearing, toxic-appearing or diaphoretic. HENT:      Head: Normocephalic and atraumatic. Right Ear: Tympanic membrane, ear canal and external ear normal.      Left Ear: Tympanic membrane, ear canal and external ear normal.      Nose: Nose normal.      Mouth/Throat:      Pharynx: Oropharynx is clear. Uvula midline. Eyes:      General: No scleral icterus. Extraocular Movements: Extraocular movements intact. Conjunctiva/sclera: Conjunctivae normal.      Pupils: Pupils are equal, round, and reactive to light. Neck:      Trachea: Trachea and phonation normal.   Cardiovascular:      Rate and Rhythm: Normal rate and regular rhythm. Heart sounds: Normal heart sounds. No murmur heard. Pulmonary:      Effort: Pulmonary effort is normal. No respiratory distress. Breath sounds: Normal breath sounds. No stridor, decreased air movement or transmitted upper airway sounds. No decreased breath sounds, wheezing, rhonchi or rales. Chest:      Chest wall: No tenderness. Abdominal:      General: Bowel sounds are normal. There is no distension. Palpations: Abdomen is soft. Tenderness: There is no abdominal tenderness. There is no right CVA tenderness, left CVA tenderness, guarding or rebound. Musculoskeletal:         General: No swelling, tenderness, deformity or signs of injury. Cervical back: Normal range of motion and neck supple. No signs of trauma or rigidity. No spinous process tenderness or muscular tenderness. Normal range of motion. Right lower leg: No edema. Left lower leg: No edema. Comments: Arms and legs are neurovascular intact with no pretibial edema or calf pain. Skin:     General: Skin is warm and dry. Coloration: Skin is not cyanotic, jaundiced, mottled or pale. Findings: No erythema or rash. Neurological:      General: No focal deficit present. Mental Status: She is alert and oriented to person, place, and time. GCS: GCS eye subscore is 4. GCS verbal subscore is 5. GCS motor subscore is 6.       Cranial Nerves: Cranial nerves are intact. No cranial nerve deficit. Sensory: Sensation is intact. Motor: Motor function is intact. Coordination: Coordination is intact. Coordination normal.      Gait: Gait is intact. Psychiatric:         Behavior: Behavior is cooperative. Procedures     [facility]                  --------------------------------------------- PAST HISTORY ---------------------------------------------  Past Medical History:  has a past medical history of Arthritis, Cerebral artery occlusion with cerebral infarction ([facility] [address] Depression, [facility] H. pylori infection, Hx of blood clots, Hx of cardiovascular stress test, Hyperlipidemia, Hypertension, and TIA (transient ischemic attack). Past Surgical History:  has a past surgical history that includes Appendectomy; Cholecystectomy; Tonsillectomy; and Knee arthroscopy (Left, 09/15/2017). Social History:  reports that she has been smoking cigarettes. She has a 16.50 pack-year smoking history. She has never used smokeless tobacco. She reports that she does not drink alcohol and does not use drugs. Family History: family history includes Cancer in her father; Diabetes in her mother; Heart Disease in her father and mother; Stroke in her mother. The patients home medications have been reviewed. Allergies: Amlodipine, Azithromycin, Codeine, Corn oil, Erythromycin, Pcn [penicillins], and Sulfa antibiotics    -------------------------------------------------- RESULTS -------------------------------------------------  Labs:  No results found for this visit on 08/01/22. Radiology:  CT HEAD WO CONTRAST   Final Result   No acute intracranial abnormality.             ------------------------- NURSING NOTES AND VITALS REVIEWED ---------------------------  Date / Time Roomed:  8/1/2022  1:44 PM  ED Bed Assignment:  24/24    The nursing notes within the ED encounter and vital signs as below have been reviewed.    BP (!) 175/105   Pulse (!) 112   Temp 98.8 °F (37.1 °C) (Oral)   Resp 20   Wt 230 lb (104.3 kg)   SpO2 97%   BMI 36.02 kg/m²   Oxygen Saturation Interpretation: Normal      ------------------------------------------ PROGRESS NOTES ------------------------------------------  I have spoken with the patient and discussed todays results, in addition to providing specific details for the plan of care and counseling regarding the diagnosis and prognosis. Their questions are answered at this time and they are agreeable with the plan. I discussed at length with them reasons for immediate return here for re evaluation. They will followup with primary care by calling their office tomorrow. --------------------------------- ADDITIONAL PROVIDER NOTES ---------------------------------  At this time the patient is without objective evidence of an acute process requiring hospitalization or inpatient management. They have remained hemodynamically stable throughout their entire ED visit and are stable for discharge with outpatient follow-up. The plan has been discussed in detail and they are aware of the specific conditions for emergent return, as well as the importance of follow-up. New Prescriptions    No medications on file       Diagnosis:  1. Bilateral hearing loss, unspecified hearing loss type        Disposition:  Patient's disposition: Discharge to home  Patient's condition is stable.          Liss Michelle DO  08/01/22 [id number]

## 2023-01-01 ENCOUNTER — HOSPITAL ENCOUNTER (OUTPATIENT)
Dept: DATA CONVERSION | Facility: HOSPITAL | Age: 48
End: 2023-08-04
Attending: INTERNAL MEDICINE | Admitting: INTERNAL MEDICINE

## 2023-01-01 VITALS
SYSTOLIC BLOOD PRESSURE: 194 MMHG | DIASTOLIC BLOOD PRESSURE: 78 MMHG | HEIGHT: 67 IN | BODY MASS INDEX: 34.6 KG/M2 | RESPIRATION RATE: 16 BRPM | WEIGHT: 220.46 LBS | HEART RATE: 56 BPM

## 2023-01-01 DIAGNOSIS — Z88.2 ALLERGY STATUS TO SULFONAMIDES: ICD-10-CM

## 2023-01-01 DIAGNOSIS — I42.0 DILATED CARDIOMYOPATHY (MULTI): ICD-10-CM

## 2023-01-01 DIAGNOSIS — I10 ESSENTIAL (PRIMARY) HYPERTENSION: ICD-10-CM

## 2023-01-01 DIAGNOSIS — E66.01 MORBID (SEVERE) OBESITY DUE TO EXCESS CALORIES (MULTI): ICD-10-CM

## 2023-01-01 DIAGNOSIS — J44.9 CHRONIC OBSTRUCTIVE PULMONARY DISEASE, UNSPECIFIED (MULTI): ICD-10-CM

## 2023-01-01 DIAGNOSIS — I20.9 ANGINA PECTORIS, UNSPECIFIED (CMS-HCC): ICD-10-CM

## 2023-01-01 DIAGNOSIS — F17.200 NICOTINE DEPENDENCE, UNSPECIFIED, UNCOMPLICATED: ICD-10-CM

## 2023-01-01 DIAGNOSIS — R07.89 OTHER CHEST PAIN: ICD-10-CM

## 2023-01-01 DIAGNOSIS — Z88.0 ALLERGY STATUS TO PENICILLIN: ICD-10-CM

## 2023-01-01 DIAGNOSIS — I20.89 OTHER FORMS OF ANGINA PECTORIS (CMS-HCC): ICD-10-CM

## 2023-01-01 DIAGNOSIS — R07.9 CHEST PAIN, UNSPECIFIED: ICD-10-CM

## 2023-04-30 ENCOUNTER — APPOINTMENT (OUTPATIENT)
Dept: CT IMAGING | Age: 48
DRG: 663 | End: 2023-04-30
Payer: MEDICAID

## 2023-04-30 ENCOUNTER — APPOINTMENT (OUTPATIENT)
Dept: GENERAL RADIOLOGY | Age: 48
DRG: 663 | End: 2023-04-30
Payer: MEDICAID

## 2023-04-30 ENCOUNTER — HOSPITAL ENCOUNTER (INPATIENT)
Age: 48
LOS: 1 days | Discharge: HOME | DRG: 663 | End: 2023-04-30
Attending: EMERGENCY MEDICINE | Admitting: HOSPITALIST
Payer: MEDICAID

## 2023-04-30 VITALS
WEIGHT: 220 LBS | HEART RATE: 62 BPM | SYSTOLIC BLOOD PRESSURE: 179 MMHG | HEIGHT: 67 IN | OXYGEN SATURATION: 93 % | RESPIRATION RATE: 19 BRPM | TEMPERATURE: 97.8 F | DIASTOLIC BLOOD PRESSURE: 94 MMHG | BODY MASS INDEX: 34.53 KG/M2

## 2023-04-30 DIAGNOSIS — Z91.148 NONCOMPLIANCE WITH MEDICATION REGIMEN: ICD-10-CM

## 2023-04-30 DIAGNOSIS — D64.9 ANEMIA, UNSPECIFIED TYPE: ICD-10-CM

## 2023-04-30 DIAGNOSIS — R42 DIZZINESS: ICD-10-CM

## 2023-04-30 DIAGNOSIS — I10 HYPERTENSION, UNSPECIFIED TYPE: ICD-10-CM

## 2023-04-30 DIAGNOSIS — R07.9 CHEST PAIN, UNSPECIFIED TYPE: Primary | ICD-10-CM

## 2023-04-30 PROBLEM — E66.09 CLASS 1 OBESITY DUE TO EXCESS CALORIES WITHOUT SERIOUS COMORBIDITY WITH BODY MASS INDEX (BMI) OF 34.0 TO 34.9 IN ADULT: Status: ACTIVE | Noted: 2018-07-09

## 2023-04-30 LAB
ABO + RH BLD: NORMAL
ACANTHOCYTES: ABNORMAL
ALBUMIN SERPL-MCNC: 4.2 G/DL (ref 3.5–5.2)
ALP SERPL-CCNC: 132 U/L (ref 35–104)
ALT SERPL-CCNC: 10 U/L (ref 0–32)
ANION GAP SERPL CALCULATED.3IONS-SCNC: 14 MMOL/L (ref 7–16)
ANISOCYTOSIS: ABNORMAL
AST SERPL-CCNC: 16 U/L (ref 0–31)
BASOPHILS # BLD: 0.09 E9/L (ref 0–0.2)
BASOPHILS NFR BLD: 1 % (ref 0–2)
BILIRUB DIRECT SERPL-MCNC: <0.2 MG/DL (ref 0–0.3)
BILIRUB INDIRECT SERPL-MCNC: ABNORMAL MG/DL (ref 0–1)
BILIRUB SERPL-MCNC: 0.3 MG/DL (ref 0–1.2)
BLD GP AB SCN SERPL QL: NORMAL
BLOOD GROUP ANTIBODIES SERPL: NORMAL
BLOOD GROUP ANTIBODIES SERPL: NORMAL
BNP BLD-MCNC: 1064 PG/ML (ref 0–125)
BUN SERPL-MCNC: 9 MG/DL (ref 6–20)
BURR CELLS: ABNORMAL
CALCIUM SERPL-MCNC: 9.2 MG/DL (ref 8.6–10.2)
CHLORIDE SERPL-SCNC: 104 MMOL/L (ref 98–107)
CO2 SERPL-SCNC: 22 MMOL/L (ref 22–29)
CREAT SERPL-MCNC: 0.8 MG/DL (ref 0.5–1)
DAT POLY-SP REAG RBC QL: NORMAL
DR. NOTIFY: NORMAL
EOSINOPHIL # BLD: 0.23 E9/L (ref 0.05–0.5)
EOSINOPHIL NFR BLD: 2.6 % (ref 0–6)
ERYTHROCYTE [DISTWIDTH] IN BLOOD BY AUTOMATED COUNT: 19.3 FL (ref 11.5–15)
GLUCOSE SERPL-MCNC: 98 MG/DL (ref 74–99)
HCG SERPL QL: NEGATIVE
HCT VFR BLD AUTO: 32.1 % (ref 34–48)
HGB BLD-MCNC: 8.7 G/DL (ref 11.5–15.5)
HYPOCHROMIA: ABNORMAL
IMM GRANULOCYTES # BLD: 0.04 E9/L
IMM GRANULOCYTES NFR BLD: 0.5 % (ref 0–5)
IRON SATN MFR SERPL: 5 % (ref 15–50)
IRON SERPL-MCNC: 21 MCG/DL (ref 37–145)
LYMPHOCYTES # BLD: 2.98 E9/L (ref 1.5–4)
LYMPHOCYTES NFR BLD: 33.9 % (ref 20–42)
MCH RBC QN AUTO: 18.4 PG (ref 26–35)
MCHC RBC AUTO-ENTMCNC: 27.1 % (ref 32–34.5)
MCV RBC AUTO: 68 FL (ref 80–99.9)
MONOCYTES # BLD: 0.49 E9/L (ref 0.1–0.95)
MONOCYTES NFR BLD: 5.6 % (ref 2–12)
NEUTROPHILS # BLD: 4.95 E9/L (ref 1.8–7.3)
NEUTS SEG NFR BLD: 56.4 % (ref 43–80)
OVALOCYTES: ABNORMAL
PLATELET # BLD AUTO: 515 E9/L (ref 130–450)
PMV BLD AUTO: 9.9 FL (ref 7–12)
POIKILOCYTES: ABNORMAL
POTASSIUM SERPL-SCNC: 3.5 MMOL/L (ref 3.5–5)
PROT SERPL-MCNC: 7.3 G/DL (ref 6.4–8.3)
RBC # BLD AUTO: 4.72 E12/L (ref 3.5–5.5)
SMUDGE CELLS: ABNORMAL
SODIUM SERPL-SCNC: 140 MMOL/L (ref 132–146)
TARGET CELLS: ABNORMAL
TIBC SERPL-MCNC: 415 MCG/DL (ref 250–450)
TROPONIN, HIGH SENSITIVITY: 15 NG/L (ref 0–9)
TROPONIN, HIGH SENSITIVITY: 15 NG/L (ref 0–9)
WBC # BLD: 8.8 E9/L (ref 4.5–11.5)

## 2023-04-30 PROCEDURE — 83540 ASSAY OF IRON: CPT

## 2023-04-30 PROCEDURE — 86870 RBC ANTIBODY IDENTIFICATION: CPT

## 2023-04-30 PROCEDURE — 83550 IRON BINDING TEST: CPT

## 2023-04-30 PROCEDURE — 6370000000 HC RX 637 (ALT 250 FOR IP): Performed by: STUDENT IN AN ORGANIZED HEALTH CARE EDUCATION/TRAINING PROGRAM

## 2023-04-30 PROCEDURE — 74174 CTA ABD&PLVS W/CONTRAST: CPT

## 2023-04-30 PROCEDURE — 86900 BLOOD TYPING SEROLOGIC ABO: CPT

## 2023-04-30 PROCEDURE — 71045 X-RAY EXAM CHEST 1 VIEW: CPT

## 2023-04-30 PROCEDURE — 80048 BASIC METABOLIC PNL TOTAL CA: CPT

## 2023-04-30 PROCEDURE — 70450 CT HEAD/BRAIN W/O DYE: CPT

## 2023-04-30 PROCEDURE — 85025 COMPLETE CBC W/AUTO DIFF WBC: CPT

## 2023-04-30 PROCEDURE — 83880 ASSAY OF NATRIURETIC PEPTIDE: CPT

## 2023-04-30 PROCEDURE — 86880 COOMBS TEST DIRECT: CPT

## 2023-04-30 PROCEDURE — 99285 EMERGENCY DEPT VISIT HI MDM: CPT

## 2023-04-30 PROCEDURE — 86850 RBC ANTIBODY SCREEN: CPT

## 2023-04-30 PROCEDURE — 84703 CHORIONIC GONADOTROPIN ASSAY: CPT

## 2023-04-30 PROCEDURE — 84484 ASSAY OF TROPONIN QUANT: CPT

## 2023-04-30 PROCEDURE — 71275 CT ANGIOGRAPHY CHEST: CPT

## 2023-04-30 PROCEDURE — 80076 HEPATIC FUNCTION PANEL: CPT

## 2023-04-30 PROCEDURE — 6360000002 HC RX W HCPCS: Performed by: STUDENT IN AN ORGANIZED HEALTH CARE EDUCATION/TRAINING PROGRAM

## 2023-04-30 PROCEDURE — 96374 THER/PROPH/DIAG INJ IV PUSH: CPT

## 2023-04-30 PROCEDURE — 96375 TX/PRO/DX INJ NEW DRUG ADDON: CPT

## 2023-04-30 PROCEDURE — 6360000004 HC RX CONTRAST MEDICATION: Performed by: RADIOLOGY

## 2023-04-30 PROCEDURE — 93005 ELECTROCARDIOGRAM TRACING: CPT | Performed by: STUDENT IN AN ORGANIZED HEALTH CARE EDUCATION/TRAINING PROGRAM

## 2023-04-30 PROCEDURE — 1200000000 HC SEMI PRIVATE

## 2023-04-30 PROCEDURE — 86901 BLOOD TYPING SEROLOGIC RH(D): CPT

## 2023-04-30 RX ORDER — DIPHENHYDRAMINE HYDROCHLORIDE 50 MG/ML
50 INJECTION INTRAMUSCULAR; INTRAVENOUS ONCE
Status: DISCONTINUED | OUTPATIENT
Start: 2023-04-30 | End: 2023-04-30

## 2023-04-30 RX ORDER — DIPHENHYDRAMINE HYDROCHLORIDE 50 MG/ML
50 INJECTION INTRAMUSCULAR; INTRAVENOUS ONCE
Status: COMPLETED | OUTPATIENT
Start: 2023-04-30 | End: 2023-04-30

## 2023-04-30 RX ORDER — SODIUM CHLORIDE 0.9 % (FLUSH) 0.9 %
10 SYRINGE (ML) INJECTION PRN
Status: DISCONTINUED | OUTPATIENT
Start: 2023-04-30 | End: 2023-04-30 | Stop reason: HOSPADM

## 2023-04-30 RX ORDER — FENTANYL CITRATE 50 UG/ML
50 INJECTION, SOLUTION INTRAMUSCULAR; INTRAVENOUS ONCE
Status: COMPLETED | OUTPATIENT
Start: 2023-04-30 | End: 2023-04-30

## 2023-04-30 RX ORDER — METOPROLOL SUCCINATE 25 MG/1
50 TABLET, EXTENDED RELEASE ORAL ONCE
Status: COMPLETED | OUTPATIENT
Start: 2023-04-30 | End: 2023-04-30

## 2023-04-30 RX ADMIN — IOPAMIDOL 80 ML: 755 INJECTION, SOLUTION INTRAVENOUS at 03:03

## 2023-04-30 RX ADMIN — FENTANYL CITRATE 50 MCG: 50 INJECTION, SOLUTION INTRAMUSCULAR; INTRAVENOUS at 01:31

## 2023-04-30 RX ADMIN — METOPROLOL SUCCINATE 50 MG: 25 TABLET, EXTENDED RELEASE ORAL at 01:30

## 2023-04-30 RX ADMIN — DIPHENHYDRAMINE HYDROCHLORIDE 50 MG: 50 INJECTION, SOLUTION INTRAMUSCULAR; INTRAVENOUS at 03:42

## 2023-04-30 ASSESSMENT — PAIN DESCRIPTION - LOCATION: LOCATION: CHEST

## 2023-04-30 ASSESSMENT — PAIN DESCRIPTION - DESCRIPTORS: DESCRIPTORS: SHARP

## 2023-04-30 ASSESSMENT — LIFESTYLE VARIABLES
HOW OFTEN DO YOU HAVE A DRINK CONTAINING ALCOHOL: NEVER
HOW MANY STANDARD DRINKS CONTAINING ALCOHOL DO YOU HAVE ON A TYPICAL DAY: PATIENT DOES NOT DRINK

## 2023-04-30 ASSESSMENT — PAIN SCALES - GENERAL: PAINLEVEL_OUTOF10: 10

## 2023-05-01 ENCOUNTER — HOSPITAL ENCOUNTER (INPATIENT)
Age: 48
LOS: 1 days | Discharge: LEFT BEFORE DISCHARGE | DRG: 313 | End: 2023-05-02
Attending: STUDENT IN AN ORGANIZED HEALTH CARE EDUCATION/TRAINING PROGRAM | Admitting: INTERNAL MEDICINE
Payer: MEDICAID

## 2023-05-01 ENCOUNTER — APPOINTMENT (OUTPATIENT)
Dept: GENERAL RADIOLOGY | Age: 48
DRG: 313 | End: 2023-05-01
Payer: MEDICAID

## 2023-05-01 DIAGNOSIS — R07.9 CHEST PAIN, UNSPECIFIED TYPE: Primary | ICD-10-CM

## 2023-05-01 DIAGNOSIS — R94.31 ABNORMAL EKG: ICD-10-CM

## 2023-05-01 LAB
ALBUMIN SERPL-MCNC: 4.3 G/DL (ref 3.5–5.2)
ALP SERPL-CCNC: 138 U/L (ref 35–104)
ALT SERPL-CCNC: 9 U/L (ref 0–32)
ANION GAP SERPL CALCULATED.3IONS-SCNC: 10 MMOL/L (ref 7–16)
ANISOCYTOSIS: ABNORMAL
AST SERPL-CCNC: 12 U/L (ref 0–31)
BASOPHILS # BLD: 0.08 E9/L (ref 0–0.2)
BASOPHILS NFR BLD: 1.1 % (ref 0–2)
BILIRUB SERPL-MCNC: 0.3 MG/DL (ref 0–1.2)
BNP BLD-MCNC: 1745 PG/ML (ref 0–125)
BUN SERPL-MCNC: 12 MG/DL (ref 6–20)
CALCIUM SERPL-MCNC: 9.4 MG/DL (ref 8.6–10.2)
CHLORIDE SERPL-SCNC: 102 MMOL/L (ref 98–107)
CO2 SERPL-SCNC: 25 MMOL/L (ref 22–29)
CREAT SERPL-MCNC: 0.7 MG/DL (ref 0.5–1)
EKG ATRIAL RATE: 66 BPM
EKG ATRIAL RATE: 80 BPM
EKG P AXIS: 45 DEGREES
EKG P AXIS: 5 DEGREES
EKG P-R INTERVAL: 148 MS
EKG P-R INTERVAL: 164 MS
EKG Q-T INTERVAL: 378 MS
EKG Q-T INTERVAL: 400 MS
EKG QRS DURATION: 104 MS
EKG QRS DURATION: 104 MS
EKG QTC CALCULATION (BAZETT): 419 MS
EKG QTC CALCULATION (BAZETT): 435 MS
EKG R AXIS: 13 DEGREES
EKG R AXIS: 15 DEGREES
EKG T AXIS: 106 DEGREES
EKG T AXIS: 140 DEGREES
EKG VENTRICULAR RATE: 66 BPM
EKG VENTRICULAR RATE: 80 BPM
EOSINOPHIL # BLD: 0.17 E9/L (ref 0.05–0.5)
EOSINOPHIL NFR BLD: 2.3 % (ref 0–6)
ERYTHROCYTE [DISTWIDTH] IN BLOOD BY AUTOMATED COUNT: 19.3 FL (ref 11.5–15)
GLUCOSE SERPL-MCNC: 98 MG/DL (ref 74–99)
HCT VFR BLD AUTO: 32.5 % (ref 34–48)
HGB BLD-MCNC: 8.5 G/DL (ref 11.5–15.5)
HYPOCHROMIA: ABNORMAL
IMM GRANULOCYTES # BLD: 0.02 E9/L
IMM GRANULOCYTES NFR BLD: 0.3 % (ref 0–5)
LYMPHOCYTES # BLD: 1.82 E9/L (ref 1.5–4)
LYMPHOCYTES NFR BLD: 24.8 % (ref 20–42)
MCH RBC QN AUTO: 17.9 PG (ref 26–35)
MCHC RBC AUTO-ENTMCNC: 26.2 % (ref 32–34.5)
MCV RBC AUTO: 68.6 FL (ref 80–99.9)
MONOCYTES # BLD: 0.46 E9/L (ref 0.1–0.95)
MONOCYTES NFR BLD: 6.3 % (ref 2–12)
NEUTROPHILS # BLD: 4.8 E9/L (ref 1.8–7.3)
NEUTS SEG NFR BLD: 65.2 % (ref 43–80)
OVALOCYTES: ABNORMAL
PLATELET # BLD AUTO: 482 E9/L (ref 130–450)
PMV BLD AUTO: 9.2 FL (ref 7–12)
POIKILOCYTES: ABNORMAL
POLYCHROMASIA: ABNORMAL
POTASSIUM SERPL-SCNC: 3.6 MMOL/L (ref 3.5–5)
PROT SERPL-MCNC: 7.3 G/DL (ref 6.4–8.3)
RBC # BLD AUTO: 4.74 E12/L (ref 3.5–5.5)
SODIUM SERPL-SCNC: 137 MMOL/L (ref 132–146)
STOMATOCYTES: ABNORMAL
TROPONIN, HIGH SENSITIVITY: 14 NG/L (ref 0–9)
TROPONIN, HIGH SENSITIVITY: 14 NG/L (ref 0–9)
WBC # BLD: 7.4 E9/L (ref 4.5–11.5)

## 2023-05-01 PROCEDURE — 93010 ELECTROCARDIOGRAM REPORT: CPT | Performed by: INTERNAL MEDICINE

## 2023-05-01 PROCEDURE — 80053 COMPREHEN METABOLIC PANEL: CPT

## 2023-05-01 PROCEDURE — 6360000002 HC RX W HCPCS: Performed by: STUDENT IN AN ORGANIZED HEALTH CARE EDUCATION/TRAINING PROGRAM

## 2023-05-01 PROCEDURE — 6370000000 HC RX 637 (ALT 250 FOR IP): Performed by: STUDENT IN AN ORGANIZED HEALTH CARE EDUCATION/TRAINING PROGRAM

## 2023-05-01 PROCEDURE — 71046 X-RAY EXAM CHEST 2 VIEWS: CPT

## 2023-05-01 PROCEDURE — 96374 THER/PROPH/DIAG INJ IV PUSH: CPT

## 2023-05-01 PROCEDURE — G0378 HOSPITAL OBSERVATION PER HR: HCPCS

## 2023-05-01 PROCEDURE — 85025 COMPLETE CBC W/AUTO DIFF WBC: CPT

## 2023-05-01 PROCEDURE — 84484 ASSAY OF TROPONIN QUANT: CPT

## 2023-05-01 PROCEDURE — 99285 EMERGENCY DEPT VISIT HI MDM: CPT

## 2023-05-01 PROCEDURE — 36415 COLL VENOUS BLD VENIPUNCTURE: CPT

## 2023-05-01 PROCEDURE — 83880 ASSAY OF NATRIURETIC PEPTIDE: CPT

## 2023-05-01 PROCEDURE — 93005 ELECTROCARDIOGRAM TRACING: CPT | Performed by: PHYSICIAN ASSISTANT

## 2023-05-01 RX ORDER — NITROGLYCERIN 0.4 MG/1
0.4 TABLET SUBLINGUAL ONCE
Status: COMPLETED | OUTPATIENT
Start: 2023-05-01 | End: 2023-05-01

## 2023-05-01 RX ORDER — HYDRALAZINE HYDROCHLORIDE 20 MG/ML
10 INJECTION INTRAMUSCULAR; INTRAVENOUS ONCE
Status: COMPLETED | OUTPATIENT
Start: 2023-05-01 | End: 2023-05-01

## 2023-05-01 RX ORDER — ASPIRIN 81 MG/1
324 TABLET, CHEWABLE ORAL ONCE
Status: COMPLETED | OUTPATIENT
Start: 2023-05-01 | End: 2023-05-01

## 2023-05-01 RX ADMIN — NITROGLYCERIN 0.4 MG: 0.4 TABLET, ORALLY DISINTEGRATING SUBLINGUAL at 21:22

## 2023-05-01 RX ADMIN — ASPIRIN 81 MG 324 MG: 81 TABLET ORAL at 21:23

## 2023-05-01 RX ADMIN — HYDRALAZINE HYDROCHLORIDE 10 MG: 20 INJECTION INTRAMUSCULAR; INTRAVENOUS at 21:27

## 2023-05-01 ASSESSMENT — PAIN DESCRIPTION - DESCRIPTORS
DESCRIPTORS: PRESSURE
DESCRIPTORS: PRESSURE

## 2023-05-01 ASSESSMENT — PAIN SCALES - GENERAL
PAINLEVEL_OUTOF10: 10
PAINLEVEL_OUTOF10: 10

## 2023-05-01 ASSESSMENT — PAIN - FUNCTIONAL ASSESSMENT: PAIN_FUNCTIONAL_ASSESSMENT: 0-10

## 2023-05-01 ASSESSMENT — PAIN DESCRIPTION - LOCATION
LOCATION: CHEST
LOCATION: CHEST;HEAD

## 2023-05-01 ASSESSMENT — LIFESTYLE VARIABLES
HOW MANY STANDARD DRINKS CONTAINING ALCOHOL DO YOU HAVE ON A TYPICAL DAY: PATIENT DOES NOT DRINK
HOW OFTEN DO YOU HAVE A DRINK CONTAINING ALCOHOL: NEVER

## 2023-05-01 NOTE — ED NOTES
Department of Emergency Medicine  FIRST PROVIDER TRIAGE NOTE             Independent MLP           5/1/23  12:32 PM EDT    Date of Encounter: 5/1/23   MRN: [id number]      HPI: Lupe Dolan is a 41 y.o. female who presents to the ED for No chief complaint on file. Pt presenting with cp and sob for a few days    ROS: Negative for fever or cough. PE: Gen Appearance/Constitutional: alert  HEENT: NC/NT. PERRLA,  Airway patent. Initial Plan of Care: All treatment areas with department are currently occupied. Plan to order/Initiate the following while awaiting opening in ED: labs, EKG, and imaging studies.   Initiate Treatment-Testing, Proceed toTreatment Area When Bed Available for ED Attending/MLP to Continue Care    Electronically signed by Cameron Murguia PA-C   DD: 5/1/23      Cameron Murguia PA-C  05/01/23 1238

## 2023-05-02 ENCOUNTER — APPOINTMENT (OUTPATIENT)
Dept: NUCLEAR MEDICINE | Age: 48
DRG: 313 | End: 2023-05-02
Payer: MEDICAID

## 2023-05-02 ENCOUNTER — APPOINTMENT (OUTPATIENT)
Dept: NON INVASIVE DIAGNOSTICS | Age: 48
DRG: 313 | End: 2023-05-02
Payer: MEDICAID

## 2023-05-02 VITALS
RESPIRATION RATE: 20 BRPM | SYSTOLIC BLOOD PRESSURE: 145 MMHG | OXYGEN SATURATION: 99 % | DIASTOLIC BLOOD PRESSURE: 74 MMHG | WEIGHT: 234.13 LBS | HEIGHT: 67 IN | TEMPERATURE: 98.5 F | HEART RATE: 65 BPM | BODY MASS INDEX: 36.75 KG/M2

## 2023-05-02 PROBLEM — R94.31 ABNORMAL EKG: Status: ACTIVE | Noted: 2023-05-02

## 2023-05-02 PROBLEM — I16.0 HYPERTENSIVE URGENCY: Status: ACTIVE | Noted: 2023-05-02

## 2023-05-02 LAB
CHOLESTEROL, TOTAL: 199 MG/DL (ref 0–199)
EKG ATRIAL RATE: 62 BPM
EKG ATRIAL RATE: 63 BPM
EKG P AXIS: 35 DEGREES
EKG P AXIS: 46 DEGREES
EKG P-R INTERVAL: 144 MS
EKG P-R INTERVAL: 156 MS
EKG Q-T INTERVAL: 438 MS
EKG Q-T INTERVAL: 454 MS
EKG QRS DURATION: 100 MS
EKG QRS DURATION: 110 MS
EKG QTC CALCULATION (BAZETT): 448 MS
EKG QTC CALCULATION (BAZETT): 460 MS
EKG R AXIS: 3 DEGREES
EKG R AXIS: 6 DEGREES
EKG T AXIS: 151 DEGREES
EKG T AXIS: 170 DEGREES
EKG VENTRICULAR RATE: 62 BPM
EKG VENTRICULAR RATE: 63 BPM
HBA1C MFR BLD: 5.2 % (ref 4–5.6)
HDLC SERPL-MCNC: 43 MG/DL
LDLC SERPL CALC-MCNC: 127 MG/DL (ref 0–99)
LV EF: 65 %
LV EF: 68 %
LVEF MODALITY: NORMAL
LVEF MODALITY: NORMAL
TRIGL SERPL-MCNC: 146 MG/DL (ref 0–149)
TROPONIN, HIGH SENSITIVITY: 14 NG/L (ref 0–9)
TROPONIN, HIGH SENSITIVITY: 15 NG/L (ref 0–9)
TSH SERPL-MCNC: 1.98 UIU/ML (ref 0.27–4.2)
VLDLC SERPL CALC-MCNC: 29 MG/DL

## 2023-05-02 PROCEDURE — A9500 TC99M SESTAMIBI: HCPCS | Performed by: RADIOLOGY

## 2023-05-02 PROCEDURE — 99254 IP/OBS CNSLTJ NEW/EST MOD 60: CPT | Performed by: INTERNAL MEDICINE

## 2023-05-02 PROCEDURE — 80061 LIPID PANEL: CPT

## 2023-05-02 PROCEDURE — 83036 HEMOGLOBIN GLYCOSYLATED A1C: CPT

## 2023-05-02 PROCEDURE — 93017 CV STRESS TEST TRACING ONLY: CPT

## 2023-05-02 PROCEDURE — 93018 CV STRESS TEST I&R ONLY: CPT | Performed by: INTERNAL MEDICINE

## 2023-05-02 PROCEDURE — 84484 ASSAY OF TROPONIN QUANT: CPT

## 2023-05-02 PROCEDURE — APPSS60 APP SPLIT SHARED TIME 46-60 MINUTES: Performed by: CLINICAL NURSE SPECIALIST

## 2023-05-02 PROCEDURE — 36415 COLL VENOUS BLD VENIPUNCTURE: CPT

## 2023-05-02 PROCEDURE — 84443 ASSAY THYROID STIM HORMONE: CPT

## 2023-05-02 PROCEDURE — 6360000002 HC RX W HCPCS: Performed by: INTERNAL MEDICINE

## 2023-05-02 PROCEDURE — 2140000000 HC CCU INTERMEDIATE R&B

## 2023-05-02 PROCEDURE — 78452 HT MUSCLE IMAGE SPECT MULT: CPT

## 2023-05-02 PROCEDURE — 3430000000 HC RX DIAGNOSTIC RADIOPHARMACEUTICAL: Performed by: RADIOLOGY

## 2023-05-02 PROCEDURE — 93005 ELECTROCARDIOGRAM TRACING: CPT | Performed by: INTERNAL MEDICINE

## 2023-05-02 PROCEDURE — 93306 TTE W/DOPPLER COMPLETE: CPT

## 2023-05-02 PROCEDURE — 6370000000 HC RX 637 (ALT 250 FOR IP): Performed by: INTERNAL MEDICINE

## 2023-05-02 PROCEDURE — 6360000002 HC RX W HCPCS: Performed by: CLINICAL NURSE SPECIALIST

## 2023-05-02 PROCEDURE — 6370000000 HC RX 637 (ALT 250 FOR IP): Performed by: STUDENT IN AN ORGANIZED HEALTH CARE EDUCATION/TRAINING PROGRAM

## 2023-05-02 PROCEDURE — 93016 CV STRESS TEST SUPVJ ONLY: CPT | Performed by: INTERNAL MEDICINE

## 2023-05-02 PROCEDURE — 6370000000 HC RX 637 (ALT 250 FOR IP): Performed by: CLINICAL NURSE SPECIALIST

## 2023-05-02 PROCEDURE — 2580000003 HC RX 258: Performed by: INTERNAL MEDICINE

## 2023-05-02 RX ORDER — ENOXAPARIN SODIUM 100 MG/ML
30 INJECTION SUBCUTANEOUS 2 TIMES DAILY
Status: DISCONTINUED | OUTPATIENT
Start: 2023-05-02 | End: 2023-05-02 | Stop reason: HOSPADM

## 2023-05-02 RX ORDER — SODIUM CHLORIDE 0.9 % (FLUSH) 0.9 %
10 SYRINGE (ML) INJECTION PRN
Status: DISCONTINUED | OUTPATIENT
Start: 2023-05-02 | End: 2023-05-02 | Stop reason: HOSPADM

## 2023-05-02 RX ORDER — LISINOPRIL 10 MG/1
10 TABLET ORAL DAILY
Qty: 30 TABLET | Refills: 3 | Status: SHIPPED | OUTPATIENT
Start: 2023-05-02

## 2023-05-02 RX ORDER — HYDRALAZINE HYDROCHLORIDE 20 MG/ML
10 INJECTION INTRAMUSCULAR; INTRAVENOUS EVERY 4 HOURS PRN
Status: DISCONTINUED | OUTPATIENT
Start: 2023-05-02 | End: 2023-05-02 | Stop reason: HOSPADM

## 2023-05-02 RX ORDER — LISINOPRIL 10 MG/1
10 TABLET ORAL DAILY
Status: DISCONTINUED | OUTPATIENT
Start: 2023-05-02 | End: 2023-05-02 | Stop reason: HOSPADM

## 2023-05-02 RX ORDER — TETRAKIS(2-METHOXYISOBUTYLISOCYANIDE)COPPER(I) TETRAFLUOROBORATE 1 MG/ML
30 INJECTION, POWDER, LYOPHILIZED, FOR SOLUTION INTRAVENOUS
Status: COMPLETED | OUTPATIENT
Start: 2023-05-02 | End: 2023-05-02

## 2023-05-02 RX ORDER — CARVEDILOL 6.25 MG/1
12.5 TABLET ORAL 2 TIMES DAILY WITH MEALS
Status: DISCONTINUED | OUTPATIENT
Start: 2023-05-02 | End: 2023-05-02 | Stop reason: HOSPADM

## 2023-05-02 RX ORDER — ATORVASTATIN CALCIUM 40 MG/1
40 TABLET, FILM COATED ORAL NIGHTLY
Status: DISCONTINUED | OUTPATIENT
Start: 2023-05-02 | End: 2023-05-02 | Stop reason: HOSPADM

## 2023-05-02 RX ORDER — ASPIRIN 81 MG/1
81 TABLET, CHEWABLE ORAL DAILY
Status: DISCONTINUED | OUTPATIENT
Start: 2023-05-02 | End: 2023-05-02 | Stop reason: HOSPADM

## 2023-05-02 RX ORDER — ONDANSETRON 4 MG/1
4 TABLET, ORALLY DISINTEGRATING ORAL EVERY 8 HOURS PRN
Status: DISCONTINUED | OUTPATIENT
Start: 2023-05-02 | End: 2023-05-02 | Stop reason: HOSPADM

## 2023-05-02 RX ORDER — SODIUM CHLORIDE 9 MG/ML
INJECTION, SOLUTION INTRAVENOUS PRN
Status: DISCONTINUED | OUTPATIENT
Start: 2023-05-02 | End: 2023-05-02 | Stop reason: HOSPADM

## 2023-05-02 RX ORDER — CARVEDILOL 6.25 MG/1
6.25 TABLET ORAL 2 TIMES DAILY WITH MEALS
Status: DISCONTINUED | OUTPATIENT
Start: 2023-05-02 | End: 2023-05-02

## 2023-05-02 RX ORDER — ACETAMINOPHEN 650 MG/1
650 SUPPOSITORY RECTAL EVERY 6 HOURS PRN
Status: DISCONTINUED | OUTPATIENT
Start: 2023-05-02 | End: 2023-05-02 | Stop reason: HOSPADM

## 2023-05-02 RX ORDER — ATORVASTATIN CALCIUM 40 MG/1
40 TABLET, FILM COATED ORAL NIGHTLY
Qty: 30 TABLET | Refills: 3 | Status: SHIPPED | OUTPATIENT
Start: 2023-05-02

## 2023-05-02 RX ORDER — SODIUM CHLORIDE 0.9 % (FLUSH) 0.9 %
10 SYRINGE (ML) INJECTION EVERY 12 HOURS SCHEDULED
Status: DISCONTINUED | OUTPATIENT
Start: 2023-05-02 | End: 2023-05-02 | Stop reason: HOSPADM

## 2023-05-02 RX ORDER — ONDANSETRON 2 MG/ML
4 INJECTION INTRAMUSCULAR; INTRAVENOUS EVERY 6 HOURS PRN
Status: DISCONTINUED | OUTPATIENT
Start: 2023-05-02 | End: 2023-05-02 | Stop reason: HOSPADM

## 2023-05-02 RX ORDER — HYDRALAZINE HYDROCHLORIDE 20 MG/ML
10 INJECTION INTRAMUSCULAR; INTRAVENOUS EVERY 4 HOURS PRN
Status: DISCONTINUED | OUTPATIENT
Start: 2023-05-02 | End: 2023-05-02 | Stop reason: SDUPTHER

## 2023-05-02 RX ORDER — CARVEDILOL 12.5 MG/1
12.5 TABLET ORAL 2 TIMES DAILY WITH MEALS
Qty: 60 TABLET | Refills: 3 | Status: SHIPPED | OUTPATIENT
Start: 2023-05-02

## 2023-05-02 RX ORDER — TETRAKIS(2-METHOXYISOBUTYLISOCYANIDE)COPPER(I) TETRAFLUOROBORATE 1 MG/ML
12 INJECTION, POWDER, LYOPHILIZED, FOR SOLUTION INTRAVENOUS
Status: COMPLETED | OUTPATIENT
Start: 2023-05-02 | End: 2023-05-02

## 2023-05-02 RX ORDER — ASPIRIN 81 MG/1
81 TABLET, CHEWABLE ORAL DAILY
Qty: 30 TABLET | Refills: 3 | Status: SHIPPED | OUTPATIENT
Start: 2023-05-03

## 2023-05-02 RX ORDER — ENOXAPARIN SODIUM 100 MG/ML
40 INJECTION SUBCUTANEOUS DAILY
Status: DISCONTINUED | OUTPATIENT
Start: 2023-05-02 | End: 2023-05-02

## 2023-05-02 RX ORDER — ACETAMINOPHEN 325 MG/1
650 TABLET ORAL EVERY 6 HOURS PRN
Status: DISCONTINUED | OUTPATIENT
Start: 2023-05-02 | End: 2023-05-02 | Stop reason: HOSPADM

## 2023-05-02 RX ADMIN — Medication 40 MILLICURIE: at 14:00

## 2023-05-02 RX ADMIN — REGADENOSON 0.4 MG: 0.08 INJECTION, SOLUTION INTRAVENOUS at 14:03

## 2023-05-02 RX ADMIN — ACETAMINOPHEN 650 MG: 325 TABLET ORAL at 08:36

## 2023-05-02 RX ADMIN — ATORVASTATIN CALCIUM 40 MG: 40 TABLET, FILM COATED ORAL at 01:39

## 2023-05-02 RX ADMIN — SODIUM CHLORIDE, PRESERVATIVE FREE 10 ML: 5 INJECTION INTRAVENOUS at 08:37

## 2023-05-02 RX ADMIN — ENOXAPARIN SODIUM 40 MG: 100 INJECTION SUBCUTANEOUS at 08:38

## 2023-05-02 RX ADMIN — CARVEDILOL 6.25 MG: 6.25 TABLET, FILM COATED ORAL at 08:37

## 2023-05-02 RX ADMIN — CARVEDILOL 12.5 MG: 6.25 TABLET, FILM COATED ORAL at 15:59

## 2023-05-02 RX ADMIN — Medication 12 MILLICURIE: at 12:39

## 2023-05-02 RX ADMIN — LISINOPRIL 10 MG: 10 TABLET ORAL at 15:59

## 2023-05-02 RX ADMIN — HYDRALAZINE HYDROCHLORIDE 10 MG: 20 INJECTION INTRAMUSCULAR; INTRAVENOUS at 04:51

## 2023-05-02 RX ADMIN — ASPIRIN 81 MG 81 MG: 81 TABLET ORAL at 08:37

## 2023-05-02 ASSESSMENT — PAIN DESCRIPTION - DESCRIPTORS: DESCRIPTORS: THROBBING

## 2023-05-02 ASSESSMENT — PAIN - FUNCTIONAL ASSESSMENT: PAIN_FUNCTIONAL_ASSESSMENT: ACTIVITIES ARE NOT PREVENTED

## 2023-05-02 ASSESSMENT — PAIN SCALES - GENERAL
PAINLEVEL_OUTOF10: 4
PAINLEVEL_OUTOF10: 10
PAINLEVEL_OUTOF10: 0
PAINLEVEL_OUTOF10: 0

## 2023-05-02 ASSESSMENT — PAIN DESCRIPTION - ORIENTATION: ORIENTATION: POSTERIOR

## 2023-05-02 ASSESSMENT — PAIN DESCRIPTION - LOCATION: LOCATION: HEAD

## 2023-05-02 NOTE — PLAN OF CARE
Problem: Chronic Conditions and Co-morbidities  Goal: Patient's chronic conditions and co-morbidity symptoms are monitored and maintained or improved  5/2/2023 1429 by Bharathi Moreno RN  Outcome: Progressing  Flowsheets (Taken 5/2/2023 0836)  Care Plan - Patient's Chronic Conditions and Co-Morbidity Symptoms are Monitored and Maintained or Improved:   Monitor and assess patient's chronic conditions and comorbid symptoms for stability, deterioration, or improvement   Collaborate with multidisciplinary team to address chronic and comorbid conditions and prevent exacerbation or deterioration  5/2/2023 0030 by Ivan Khalil RN  Outcome: Progressing     Problem: Pain  Goal: Verbalizes/displays adequate comfort level or baseline comfort level  5/2/2023 1429 by Bharathi Moreno RN  Outcome: Progressing  5/2/2023 0030 by Ivan Khalil RN  Outcome: Progressing

## 2023-05-02 NOTE — ED NOTES
Pt falling asleep, O2 dropped to 88% on room air, Pt placed on 2L NC while asleep. Pt awakened and O2 is normal at 96%.       Mary Gonzalez RN  05/01/23 [id number]

## 2023-05-02 NOTE — DISCHARGE INSTRUCTIONS
Continue medications as prescribed  Follow-up primary care provider  Monitor blood pressure closely on discharge  Go  to the nearest ER for any worsening of symptoms.

## 2023-05-02 NOTE — PROCEDURES
[facility] Nuclear Stress Test:    Cardiologist: Dr. Nean Arvizu MD    Date: 5/2/2023    Indications for study: Chest pain    No chest pain  No new arrhythmias  Non-diagnostic stress EKG  Nuclear images pending    Lavinia Pichardo MD  [facility]) Cardiology

## 2023-05-02 NOTE — CARE COORDINATION
Patient presented to Ed with c/o chest pain. Pox 88% placed on 2 l . Remains on 2 l  O2 with pox 99% BP elevated 213/109 now 188/88. IV Apresoline ordered every 4 hrs prn. Cardiology consulted. . 2 D echo ordered. O2  at 2 l placed in Ed for comfort. NPO. Public Benefits notified for financial assessment. Pharmacy notified that if patient does not have coverage for her discharge medication that they need to call  Prescription Assistance to bridge her medication. This information given to Alondra in outpatient Pharmacy. Patient given Prescription Assistance number  if additional assistance needed. Met with patient at bedside to discuss transition of care. Patient lives with her significant other in a single level home. She was independent PTA. She anticipates no additional needs at this time. CM/SW will continue to follow  Case Management Assessment  Initial Evaluation    Date/Time of Evaluation: 5/2/2023 9:10 AM  Assessment Completed by: Lorna Escudero RN    If patient is discharged prior to next notation, then this note serves as note for discharge by case management. Patient Name: Braulio Donovan                   YOB: 1975  Diagnosis: Chest pain [R07.9]  Abnormal EKG [R94.31]  Chest pain, unspecified type [R07.9]                   Date / Time: 5/1/2023  7:34 PM    Patient Admission Status: Inpatient   Readmission Risk (Low < 19, Mod (19-27), High > 27): Readmission Risk Score: 8.9    Current PCP: Raghavendra Rodriguez, DO  PCP verified by CM? Yes (dr Heena Alcaraz)    Chart Reviewed: Yes      History Provided by: Patient  Patient Orientation: Alert and Oriented, Person, Place, Situation, Self    Patient Cognition: Alert    Hospitalization in the last 30 days (Readmission):  No    If yes, Readmission Assessment in CM Navigator will be completed.     Advance Directives:      Code Status: Full Code   Patient's Primary Decision Maker is: Patient Declined (Legal Next of Kin Remains as Decision

## 2023-05-02 NOTE — H&P
Decision Support Exception - unselect if not a suspected or confirmed emergency medical condition->Emergency Medical Condition (MA) What reading provider will be dictating this exam?->CRC FINDINGS: CTA CHEST: Thoracic aorta: No evidence of thoracic aortic aneurysm or dissection. No acute abnormality of the aorta. Supra-aortic trunks are widely patent. Mediastinum: No evidence of mediastinal lymphadenopathy. The heart and pericardium demonstrate no acute abnormality. No pulmonary emboli detected with adequate pulmonary arterial contrast present. Lungs/Pleura: The lungs are without acute process. No focal consolidation or pulmonary edema. No evidence of pleural effusion or pneumothorax. Soft Tissues/Bones: No acute bone or soft tissue abnormality. CTA ABDOMEN: Abdominal aorta/Branches: Normal caliber and contour of the abdominal aorta. The celiac, SMA, single left and duplicated right renal arteries and LENORA are widely patent. Organs: Cholecystectomy. Otherwise, the Liver, biliary tree, bilateral adrenal glands, bilateral kidneys, spleen and pancreas are normal. GI/Bowel: No ileus or obstruction. No inflammatory bowel process. Appendix not visible. No pericecal inflammatory change to suggest occult appendicitis. Peritoneum/Retroperitoneum: Tiny fatty umbilical hernia. No adenopathy or fluid. Bones/Soft Tissues: Negative. CTA PELVIS: Aorta/Iliacs: Normal. Other: 6.2 cm complicated cystic appearance of the left ovary/adnexa. No significant surrounding free fluid or infiltration. Bulky leiomyomatous uterus measuring 12.3 cm in length. Bones/Soft Tissues: Negative. 1. No acute disease. No acute aortic process or dissection. No pulmonary emboli detected with adequate pulmonary arterial contrast present. 2. 6.2 cm complicated cystic appearance of the left ovary/adnexa. No significant surrounding free fluid or infiltration. 3. Bulky leiomyomatous uterus measuring 12.3 cm in length. 4. Appendix not visible.

## 2023-05-02 NOTE — CARE COORDINATION
5/2:  Update CM Note:  Pt off the floor. CM called meds to beds to fill her scripts. ASA 45.00 Lipitor $19.98 Coreg $15.98. Prescription Assistance can cover the cost & will contact our pharmacy. CM will need to verify PCP is Dr. Bala Lopez. If not will need set up for PCP[facility]. Pt will need to check with prescription assistance at [phone], to see if they can help her after the 30days.   Electronically signed by Thomas Paiz RN on 5/2/2023 at 3:50 PM

## 2023-05-02 NOTE — CONSULTS
Inpatient Cardiology Consultation      Reason for Consult:  Chest Pain     Consulting Physician: Dr. Gildardo Valenzuela    Requesting Physician:  Dr. Arron Broderick    Date of Consultation: 2023    HISTORY OF PRESENT ILLNESS:   Ms. Douglas Larose is a 41 yr old female, seen remotely by Dr. Shanthi Velasco during a hospitalization for CP in . PMH: HTN, DM, Obesity, Factor VIII deficiency, Tobacco abuse, HLD, TIA 2018, depression, + family hx (mother  in 's CAD / DM), recent anemia, noncompliance     [facility] ED 23 - \"electric shocks in chest that shoot down to feet\"  RAD: CTA chest, abd, pelvis: No PE, L ovary cyst  Stool hemoccult negative  Patient signed out against medical advise      [facility] ED: 2023 1224 walk in for CP few days, L arm numb, SOB  Per ED notes, patient cannot afford medications   Arrival vitals: T 97.8 RR 20 HR 91 /122 SPO2 100% RA dropped to 88% RA while sleeping - placed on 2 L NC  BMI 36.67   Significant Labs:  Troponin 15-15 -14 - 14 ProBNP 1064 - 1745 K 3.6 BUN 12 Cr 0.7 HDL 43  Tri 146 HgA1C 5.2 Hgb 8.7 -8.5 Plt 482  Pregnancy negative  RAD: 2 view CXR negative  ED treatment: Aspirin 324mg, Apresoline 10mg IV, NTG SL 0.4mg SL     Patient seen and examined. Recent vitals: T 98.1 RR 20 HR 52 - 69 /88 - 168/77 SPO2 99% RA   Patient tells me that she has been off all medications since 2019 due to losing her insurance and inability to afford. She is not active  - mostly sedentary. She does have 20 steps to get to her basement. No CP or CHU with this. She has had heavy menstruation since Oct. Passing large \"softball size\" clots. Her periods are coming every 14 days now. LMP 23. She has felt fatigued. On 23, she noted a sudden onset of L sided chest pain that she describes as an \"electrical current\" running across her chest and down both legs. This was constant for 1.5 hours and \"severe\" 10/10. Associated symptoms were lightheadedness, palpitations, diaphoresis.

## 2023-05-03 NOTE — PROGRESS NOTES
CLINICAL PHARMACY NOTE: MEDS TO BEDS    Total # of Prescriptions Filled: 4   The following medications were delivered to the patient:  Lisinopril 10 mg  Aspirin low dose  Atorvastatin 40 mg  Carvedilol 12.5 mg    Additional Documentation:     Meds delivered to patient
Cardiology consult sent
Patient was told when she asked that there were no discharge orders put in for her. She stated she needs to leave right now because she does not have a ride home tomorrow and did not want to stay another night. Patient was educated on the risks of leaving without a discharge order and she chose to leave anyway. TIARAA paper has been signed and put in her chart.
Perfect serve to Yuriy Corona NP re: cardiology consult.
Received into [address] via cart from ED. Tele applied, oriented to environment. Assessment per flowsheet. Dr. Sade kapoor served for admission orders.
SubCUTAneous BID     PRN Meds: [Held by provider] hydrALAZINE, sodium chloride flush, sodium chloride, ondansetron **OR** ondansetron, magnesium hydroxide, acetaminophen **OR** acetaminophen    I/O    Intake/Output Summary (Last 24 hours) at 5/2/2023 1424  Last data filed at 5/2/2023 1000  Gross per 24 hour   Intake 0 ml   Output 300 ml   Net -300 ml       Labs:   Recent Labs     04/30/23  0125 05/01/23  1253   WBC 8.8 7.4   HGB 8.7* 8.5*   HCT 32.1* 32.5*   * 482*       Recent Labs     04/30/23 0125 05/01/23  1253    137   K 3.5 3.6    102   CO2 22 25   BUN 9 12   CREATININE 0.8 0.7   CALCIUM 9.2 9.4       Recent Labs     04/30/23 0125 05/01/23  1253   PROT 7.3 7.3   ALKPHOS 132* 138*   ALT 10 9   AST 16 12   BILITOT 0.3 0.3       No results for input(s): INR in the last 72 hours. No results for input(s): Ab Juan Fman in the last 72 hours. Chronic labs:  Lab Results   Component Value Date    CHOL 199 05/02/2023    TRIG 146 05/02/2023    HDL 43 05/02/2023    LDLCALC 127 (H) 05/02/2023    TSH 1.980 05/02/2023    INR 1.1 07/09/2018    LABA1C 5.2 05/02/2023       Radiology:  Imaging studies reviewed today. ASSESSMENT:  Chest pain  Abnormal EKG  Uncontrolled hypertension  History of TIA  Hyperlipidemia    PLAN:  Telemetry monitoring  N.p.o. pending cardiology eval.  Possible stress test  Aspirin. Statin. Nitro as needed  Started on [employer]. Increased dose to 12.5 twice daily. Monitor blood pressure. May add hydralazine for better control. Since allergy to lisinopril and amlodipine.   Possible DC pending results of stress test.      Diet: Diet NPO Exceptions are: Sips of Water with Meds  Code Status: Full Code  PT/OT Eval Status:     DVT Prophylaxis:     Recommended disposition at discharge:    Possible DC home 24 hours if no abnormality on stress test.  +++++++++++++++++++++++++++++++++++++++++++++++++  Mar Haider MD   [facility]

## 2023-05-03 NOTE — DISCHARGE SUMMARY
Hospital Medicine Discharge Summary    Patient ID: Chinyere Taveras      Patient's PCP: Janice Sterling DO    Admit Date: 5/1/2023     Discharge Date: 5/2/2023      Admitting Physician: Mariia Duong MD     Discharge Physician: Selina Glaser MD     Discharge condition: Guarded left AMA    Discharge Diagnoses: Active Hospital Problems    Diagnosis Date Noted    Abnormal EKG [R94.31] 05/02/2023    Hypertensive urgency [I16.0] 05/02/2023    Chest pain [R07.9] 05/01/2023       The patient was seen and examined on day of discharge and this discharge summary is in conjunction with any daily progress note from day of discharge. Hospital Course:       44-year-old female with a history of hyperlipidemia hypertension TIA. Presents with left-sided chest pain with radiation to the whole body associated with lightheadedness. Patient is a known hypertensive noncompliant with medications. On presentation patient was hypertensive. Troponin 14>> 14. BNP 1745. EKG showing lateral ST changes. No ST elevations. T wave inversion. Patient admitted for further work-up. Cardiology consulted. Echocardiogram pending. Stress test showed no abnormality. No arrhythmias EF 65%. Echocardiogram grade 2 diastolic dysfunction. No valvular disease. EF 65 to 70%. Patient was to be discharged in the morning however overnight she requested to leave [address] Exam:     BP (!) 145/74   Pulse 65   Temp 98.5 °F (36.9 °C) (Temporal)   Resp 20   Ht 5' 7\" (1.702 m)   Wt 234 lb 2.1 oz (106.2 kg)   SpO2 99%   BMI 36.67 kg/m²     General appearance: No apparent distress, appears stated age and cooperative. HEENT: Pupils equal, round, and reactive to light. Conjunctivae/corneas clear. Neck: Supple. No jugular venous distention. Trachea midline. Respiratory:  Normal respiratory effort. Clear to auscultation, bilaterally without Rales/Wheezes/Rhonchi.   Cardiovascular: Regular rate and rhythm

## 2023-05-17 PROCEDURE — 93005 ELECTROCARDIOGRAM TRACING: CPT | Performed by: NURSE PRACTITIONER

## 2023-05-17 PROCEDURE — 99285 EMERGENCY DEPT VISIT HI MDM: CPT

## 2023-05-17 RX ORDER — ASPIRIN 81 MG/1
324 TABLET, CHEWABLE ORAL ONCE
Status: COMPLETED | OUTPATIENT
Start: 2023-05-18 | End: 2023-05-18

## 2023-05-18 ENCOUNTER — HOSPITAL ENCOUNTER (INPATIENT)
Age: 48
LOS: 1 days | Discharge: HOME | End: 2023-05-19
Attending: EMERGENCY MEDICINE | Admitting: INTERNAL MEDICINE
Payer: COMMERCIAL

## 2023-05-18 ENCOUNTER — APPOINTMENT (OUTPATIENT)
Dept: GENERAL RADIOLOGY | Age: 48
End: 2023-05-18
Payer: COMMERCIAL

## 2023-05-18 DIAGNOSIS — R07.9 CHEST PAIN, UNSPECIFIED TYPE: Primary | ICD-10-CM

## 2023-05-18 DIAGNOSIS — D64.9 CHRONIC ANEMIA: ICD-10-CM

## 2023-05-18 LAB
ALBUMIN SERPL-MCNC: 3.8 G/DL (ref 3.5–5.2)
ALP SERPL-CCNC: 135 U/L (ref 35–104)
ALT SERPL-CCNC: 11 U/L (ref 0–32)
ANION GAP SERPL CALCULATED.3IONS-SCNC: 8 MMOL/L (ref 7–16)
ANISOCYTOSIS: ABNORMAL
AST SERPL-CCNC: 15 U/L (ref 0–31)
BASOPHILS # BLD: 0.07 E9/L (ref 0–0.2)
BASOPHILS NFR BLD: 0.9 % (ref 0–2)
BILIRUB SERPL-MCNC: 0.3 MG/DL (ref 0–1.2)
BNP BLD-MCNC: 968 PG/ML (ref 0–125)
BUN SERPL-MCNC: 13 MG/DL (ref 6–20)
CALCIUM SERPL-MCNC: 8.4 MG/DL (ref 8.6–10.2)
CHLORIDE SERPL-SCNC: 101 MMOL/L (ref 98–107)
CO2 SERPL-SCNC: 23 MMOL/L (ref 22–29)
CREAT SERPL-MCNC: 0.7 MG/DL (ref 0.5–1)
EKG ATRIAL RATE: 51 BPM
EKG ATRIAL RATE: 61 BPM
EKG P AXIS: 36 DEGREES
EKG P AXIS: 63 DEGREES
EKG P-R INTERVAL: 132 MS
EKG P-R INTERVAL: 152 MS
EKG Q-T INTERVAL: 408 MS
EKG Q-T INTERVAL: 438 MS
EKG QRS DURATION: 104 MS
EKG QRS DURATION: 82 MS
EKG QTC CALCULATION (BAZETT): 403 MS
EKG QTC CALCULATION (BAZETT): 410 MS
EKG R AXIS: 1 DEGREES
EKG R AXIS: 66 DEGREES
EKG T AXIS: 174 DEGREES
EKG T AXIS: 47 DEGREES
EKG VENTRICULAR RATE: 51 BPM
EKG VENTRICULAR RATE: 61 BPM
EOSINOPHIL # BLD: 0.22 E9/L (ref 0.05–0.5)
EOSINOPHIL NFR BLD: 2.7 % (ref 0–6)
ERYTHROCYTE [DISTWIDTH] IN BLOOD BY AUTOMATED COUNT: 19.3 FL (ref 11.5–15)
GLUCOSE SERPL-MCNC: 87 MG/DL (ref 74–99)
HCG, URINE, POC: NEGATIVE
HCT VFR BLD AUTO: 30 % (ref 34–48)
HGB BLD-MCNC: 7.8 G/DL (ref 11.5–15.5)
HYPOCHROMIA: ABNORMAL
LYMPHOCYTES # BLD: 2.59 E9/L (ref 1.5–4)
LYMPHOCYTES NFR BLD: 32.1 % (ref 20–42)
Lab: NORMAL
MCH RBC QN AUTO: 18.1 PG (ref 26–35)
MCHC RBC AUTO-ENTMCNC: 26 % (ref 32–34.5)
MCV RBC AUTO: 69.4 FL (ref 80–99.9)
MONOCYTES # BLD: 0.4 E9/L (ref 0.1–0.95)
MONOCYTES NFR BLD: 5.4 % (ref 2–12)
NEGATIVE QC PASS/FAIL: NORMAL
NEUTROPHILS # BLD: 4.78 E9/L (ref 1.8–7.3)
NEUTS SEG NFR BLD: 58.9 % (ref 43–80)
OVALOCYTES: ABNORMAL
PLATELET # BLD AUTO: 397 E9/L (ref 130–450)
PMV BLD AUTO: 9.9 FL (ref 7–12)
POIKILOCYTES: ABNORMAL
POLYCHROMASIA: ABNORMAL
POSITIVE QC PASS/FAIL: NORMAL
POTASSIUM SERPL-SCNC: 3.5 MMOL/L (ref 3.5–5)
PROT SERPL-MCNC: 6.6 G/DL (ref 6.4–8.3)
RBC # BLD AUTO: 4.32 E12/L (ref 3.5–5.5)
SODIUM SERPL-SCNC: 132 MMOL/L (ref 132–146)
STOMATOCYTES: ABNORMAL
TEAR DROP CELLS: ABNORMAL
TROPONIN, HIGH SENSITIVITY: 11 NG/L (ref 0–9)
TROPONIN, HIGH SENSITIVITY: 12 NG/L (ref 0–9)
WBC # BLD: 8.1 E9/L (ref 4.5–11.5)

## 2023-05-18 PROCEDURE — 71045 X-RAY EXAM CHEST 1 VIEW: CPT

## 2023-05-18 PROCEDURE — 85025 COMPLETE CBC W/AUTO DIFF WBC: CPT

## 2023-05-18 PROCEDURE — G0378 HOSPITAL OBSERVATION PER HR: HCPCS

## 2023-05-18 PROCEDURE — 93010 ELECTROCARDIOGRAM REPORT: CPT | Performed by: INTERNAL MEDICINE

## 2023-05-18 PROCEDURE — 99254 IP/OBS CNSLTJ NEW/EST MOD 60: CPT | Performed by: INTERNAL MEDICINE

## 2023-05-18 PROCEDURE — 83880 ASSAY OF NATRIURETIC PEPTIDE: CPT

## 2023-05-18 PROCEDURE — 6370000000 HC RX 637 (ALT 250 FOR IP): Performed by: NURSE PRACTITIONER

## 2023-05-18 PROCEDURE — 93005 ELECTROCARDIOGRAM TRACING: CPT | Performed by: EMERGENCY MEDICINE

## 2023-05-18 PROCEDURE — 80053 COMPREHEN METABOLIC PANEL: CPT

## 2023-05-18 PROCEDURE — 36415 COLL VENOUS BLD VENIPUNCTURE: CPT

## 2023-05-18 PROCEDURE — 2580000003 HC RX 258: Performed by: FAMILY MEDICINE

## 2023-05-18 PROCEDURE — 6370000000 HC RX 637 (ALT 250 FOR IP): Performed by: PHYSICIAN ASSISTANT

## 2023-05-18 PROCEDURE — APPSS60 APP SPLIT SHARED TIME 46-60 MINUTES: Performed by: PHYSICIAN ASSISTANT

## 2023-05-18 PROCEDURE — 84484 ASSAY OF TROPONIN QUANT: CPT

## 2023-05-18 PROCEDURE — 6360000002 HC RX W HCPCS: Performed by: FAMILY MEDICINE

## 2023-05-18 PROCEDURE — 6370000000 HC RX 637 (ALT 250 FOR IP): Performed by: FAMILY MEDICINE

## 2023-05-18 RX ORDER — ENOXAPARIN SODIUM 100 MG/ML
40 INJECTION SUBCUTANEOUS DAILY
Status: DISCONTINUED | OUTPATIENT
Start: 2023-05-18 | End: 2023-05-20 | Stop reason: HOSPADM

## 2023-05-18 RX ORDER — ACETAMINOPHEN 325 MG/1
650 TABLET ORAL EVERY 6 HOURS PRN
Status: DISCONTINUED | OUTPATIENT
Start: 2023-05-18 | End: 2023-05-20 | Stop reason: HOSPADM

## 2023-05-18 RX ORDER — CARVEDILOL 6.25 MG/1
12.5 TABLET ORAL 2 TIMES DAILY WITH MEALS
Status: DISCONTINUED | OUTPATIENT
Start: 2023-05-18 | End: 2023-05-20 | Stop reason: HOSPADM

## 2023-05-18 RX ORDER — LISINOPRIL 20 MG/1
20 TABLET ORAL DAILY
Status: DISCONTINUED | OUTPATIENT
Start: 2023-05-18 | End: 2023-05-20 | Stop reason: HOSPADM

## 2023-05-18 RX ORDER — POLYETHYLENE GLYCOL 3350 17 G/17G
17 POWDER, FOR SOLUTION ORAL DAILY PRN
Status: DISCONTINUED | OUTPATIENT
Start: 2023-05-18 | End: 2023-05-20 | Stop reason: HOSPADM

## 2023-05-18 RX ORDER — ASPIRIN 81 MG/1
81 TABLET, CHEWABLE ORAL DAILY
Status: DISCONTINUED | OUTPATIENT
Start: 2023-05-18 | End: 2023-05-18

## 2023-05-18 RX ORDER — SODIUM CHLORIDE 0.9 % (FLUSH) 0.9 %
10 SYRINGE (ML) INJECTION PRN
Status: DISCONTINUED | OUTPATIENT
Start: 2023-05-18 | End: 2023-05-20 | Stop reason: HOSPADM

## 2023-05-18 RX ORDER — LISINOPRIL 10 MG/1
10 TABLET ORAL DAILY
Status: DISCONTINUED | OUTPATIENT
Start: 2023-05-18 | End: 2023-05-18

## 2023-05-18 RX ORDER — SODIUM CHLORIDE 0.9 % (FLUSH) 0.9 %
10 SYRINGE (ML) INJECTION EVERY 12 HOURS SCHEDULED
Status: DISCONTINUED | OUTPATIENT
Start: 2023-05-18 | End: 2023-05-20 | Stop reason: HOSPADM

## 2023-05-18 RX ORDER — ASPIRIN 81 MG/1
81 TABLET, CHEWABLE ORAL DAILY
Status: DISCONTINUED | OUTPATIENT
Start: 2023-05-18 | End: 2023-05-20 | Stop reason: HOSPADM

## 2023-05-18 RX ORDER — ONDANSETRON 2 MG/ML
4 INJECTION INTRAMUSCULAR; INTRAVENOUS EVERY 6 HOURS PRN
Status: DISCONTINUED | OUTPATIENT
Start: 2023-05-18 | End: 2023-05-20 | Stop reason: HOSPADM

## 2023-05-18 RX ORDER — HYDRALAZINE HYDROCHLORIDE 20 MG/ML
10 INJECTION INTRAMUSCULAR; INTRAVENOUS EVERY 6 HOURS PRN
Status: DISCONTINUED | OUTPATIENT
Start: 2023-05-18 | End: 2023-05-20 | Stop reason: HOSPADM

## 2023-05-18 RX ORDER — PROMETHAZINE HYDROCHLORIDE 12.5 MG/1
12.5 TABLET ORAL EVERY 6 HOURS PRN
Status: DISCONTINUED | OUTPATIENT
Start: 2023-05-18 | End: 2023-05-20 | Stop reason: HOSPADM

## 2023-05-18 RX ORDER — ISOSORBIDE MONONITRATE 30 MG/1
60 TABLET, EXTENDED RELEASE ORAL DAILY
Status: DISCONTINUED | OUTPATIENT
Start: 2023-05-18 | End: 2023-05-20 | Stop reason: HOSPADM

## 2023-05-18 RX ORDER — ATORVASTATIN CALCIUM 40 MG/1
40 TABLET, FILM COATED ORAL NIGHTLY
Status: DISCONTINUED | OUTPATIENT
Start: 2023-05-18 | End: 2023-05-20 | Stop reason: HOSPADM

## 2023-05-18 RX ORDER — ACETAMINOPHEN 650 MG/1
650 SUPPOSITORY RECTAL EVERY 6 HOURS PRN
Status: DISCONTINUED | OUTPATIENT
Start: 2023-05-18 | End: 2023-05-20 | Stop reason: HOSPADM

## 2023-05-18 RX ORDER — SODIUM CHLORIDE 9 MG/ML
INJECTION, SOLUTION INTRAVENOUS PRN
Status: DISCONTINUED | OUTPATIENT
Start: 2023-05-18 | End: 2023-05-20 | Stop reason: HOSPADM

## 2023-05-18 RX ADMIN — SODIUM CHLORIDE, PRESERVATIVE FREE 10 ML: 5 INJECTION INTRAVENOUS at 10:51

## 2023-05-18 RX ADMIN — ATORVASTATIN CALCIUM 40 MG: 40 TABLET, FILM COATED ORAL at 20:10

## 2023-05-18 RX ADMIN — ASPIRIN 81 MG 324 MG: 81 TABLET ORAL at 01:41

## 2023-05-18 RX ADMIN — LISINOPRIL 20 MG: 20 TABLET ORAL at 10:46

## 2023-05-18 RX ADMIN — ASPIRIN 81 MG CHEWABLE TABLET 81 MG: 81 TABLET CHEWABLE at 10:46

## 2023-05-18 RX ADMIN — ENOXAPARIN SODIUM 40 MG: 100 INJECTION SUBCUTANEOUS at 10:46

## 2023-05-18 RX ADMIN — SODIUM CHLORIDE, PRESERVATIVE FREE 10 ML: 5 INJECTION INTRAVENOUS at 20:10

## 2023-05-18 RX ADMIN — ISOSORBIDE MONONITRATE 60 MG: 30 TABLET, EXTENDED RELEASE ORAL at 16:27

## 2023-05-18 ASSESSMENT — PAIN SCALES - GENERAL: PAINLEVEL_OUTOF10: 10

## 2023-05-18 ASSESSMENT — LIFESTYLE VARIABLES: HOW OFTEN DO YOU HAVE A DRINK CONTAINING ALCOHOL: NEVER

## 2023-05-18 ASSESSMENT — HEART SCORE: ECG: 1

## 2023-05-18 ASSESSMENT — PAIN - FUNCTIONAL ASSESSMENT: PAIN_FUNCTIONAL_ASSESSMENT: 0-10

## 2023-05-18 NOTE — ED PROVIDER NOTES
potassium is 3.5 patient's proBNP was 968 troponin was 12 pregnancy test was negative chest x-ray showed no infiltrate or effusion. Patient's EKG sinus that does show T wave inversions laterally actually slightly improved compared to prior EKG. Patient did have noted ischemic changes on her prior EKG on May 2. Patient was given aspirin in the waiting room. Patient's vital signs noted. Patient was initially hypertensive on arrival.  Patient current blood pressure is 166/75. Patient still having chest discomfort patient does have multiple risk factors for heart disease at patient did have recent stress test but is complaining of chest pain and pressure in her chest going up into her neck area. Patient will be admitted for observation I did speak to on-call internal medicine. Patient will be admitted to monitored bed. Social Determinants affecting Dx or Tx: Patient does smoke    Chronic Conditions: History of hypertension hyperlipidemia history of stroke history of arthritis history of depression history of factor VIII deficiency    Records Reviewed:   I did review patient's nuclear stress test was negative. Ejection fraction was 65%  Patient had echo done which showed severe concentric left ventricular hypertrophy grade 2 diastolic dysfunction ejection fraction 65%    Re-Evaluations:             Re-evaluation. Patients symptoms show no change  Patient was reevaluated patient was medicated aspirin. Patient made aware of findings. Patient comfortable being admitted. Consultations:             Dr Garner Brought: This patient's ED course included: a personal history and physicial eaxmination    This patient has been closely monitored during their ED course. Counseling: The emergency provider has spoken with the patient and discussed todays results, in addition to providing specific details for the plan of care and counseling regarding the diagnosis and prognosis.   Questions

## 2023-05-18 NOTE — CONSULTS
IntraVENous, PRN, Rose Estrada DO    0.9 % sodium chloride infusion, , IntraVENous, PRN, Rose Estrada, DO    enoxaparin (LOVENOX) injection 40 mg, 40 mg, SubCUTAneous, Daily, Rose Estrada DO    promethazine (PHENERGAN) tablet 12.5 mg, 12.5 mg, Oral, Q6H PRN **OR** ondansetron (ZOFRAN) injection 4 mg, 4 mg, IntraVENous, Q6H PRN, Rose Estrada DO    polyethylene glycol (GLYCOLAX) packet 17 g, 17 g, Oral, Daily PRN, Rose Estrada DO    acetaminophen (TYLENOL) tablet 650 mg, 650 mg, Oral, Q6H PRN **OR** acetaminophen (TYLENOL) suppository 650 mg, 650 mg, Rectal, Q6H PRN, Rose Estrada DO    Current Outpatient Medications:     aspirin 81 MG chewable tablet, Take 1 tablet by mouth daily, Disp: 30 tablet, Rfl: 3    atorvastatin (LIPITOR) 40 MG tablet, Take 1 tablet by mouth nightly, Disp: 30 tablet, Rfl: 3    carvedilol (COREG) 12.5 MG tablet, Take 1 tablet by mouth 2 times daily (with meals), Disp: 60 tablet, Rfl: 3    lisinopril (PRINIVIL;ZESTRIL) 10 MG tablet, Take 1 tablet by mouth daily, Disp: 30 tablet, Rfl: 3    ALLERGIES:  Amlodipine, Azithromycin, Codeine, Corn oil, Erythromycin, Pcn [penicillins], Sulfa antibiotics, and Fentanyl    SOCIAL HISTORY:       Lives at home alone   Tobacco - rolls own cigarettes, 1 PPD since age 27  Denies ETOH or illicit drugs. FAMILY HISTORY:   Mother passed in her 52's of DM & CAD / Father passed age 28 of ocular melanoma. 3 brothers healthy no children. REVIEW OF SYSTEMS:     Negative except as noted above in HPI. PHYSICAL EXAM:   BP (!) 158/66   Pulse 54   Temp 98.1 °F (36.7 °C) (Oral)   Resp 16   Wt 220 lb (99.8 kg)   SpO2 98%   BMI 34.46 kg/m²   CONST:  Well developed, obese WF who appears stated age. Awake, alert, cooperative, no apparent distress. HEENT:   Head- Normocephalic, atraumatic. Eyes- Conjunctivae pink, anicteric. Neck-  No stridor, trachea midline, no apparent jugular venous distention.    CHEST: Chest symmetrical and

## 2023-05-18 NOTE — ED NOTES
Department of Emergency Medicine  FIRST PROVIDER TRIAGE NOTE             Independent MLP           5/18/23  12:00 AM EDT    Date of Encounter: 5/18/23   MRN: [id number]      HPI: Bhumika Roberson is a 41 y.o. female who presents to the ED for Chest Pain (Chest pain that woke patient up from sleeping, radiates to the patients neck. +SOB. +n/v)     Recently admitted om 5/1  and seen by Cardiology and had a negative Stress Test on 5/2/2023    ROS: Negative for back pain or fever. PE: Gen Appearance/Constitutional: alert  HEENT: NC/NT. PERRLA,  Airway patent. Initial Plan of Care: All treatment areas with department are currently occupied. Plan to order/Initiate the following while awaiting opening in ED: labs, EKG, and imaging studies.   Initiate Treatment-Testing, Proceed toTreatment Area When Bed Available for ED Attending/MLP to Continue Care    Electronically signed by REGULO Linda CNP   DD: 5/18/23       REGULO Linda CNP  05/18/23 0001  ATTENDING PROVIDER ATTESTATION:     Supervising Physician, on-site, available for consultation, non-participatory in the evaluation or care of this patient       Sade Coronel MD  05/18/23 8624

## 2023-05-19 VITALS
SYSTOLIC BLOOD PRESSURE: 166 MMHG | TEMPERATURE: 96.8 F | DIASTOLIC BLOOD PRESSURE: 74 MMHG | BODY MASS INDEX: 34.46 KG/M2 | WEIGHT: 220 LBS | RESPIRATION RATE: 16 BRPM | HEART RATE: 63 BPM | OXYGEN SATURATION: 97 %

## 2023-05-19 PROBLEM — D64.9 ACUTE ON CHRONIC ANEMIA: Status: ACTIVE | Noted: 2023-05-19

## 2023-05-19 PROBLEM — D64.9 ANEMIA: Status: ACTIVE | Noted: 2023-05-19

## 2023-05-19 PROBLEM — D50.9 IRON DEFICIENCY ANEMIA: Status: ACTIVE | Noted: 2023-05-19

## 2023-05-19 LAB
ABO + RH BLD: NORMAL
ALBUMIN SERPL-MCNC: 3.8 G/DL (ref 3.5–5.2)
ALP SERPL-CCNC: 124 U/L (ref 35–104)
ALT SERPL-CCNC: 9 U/L (ref 0–32)
ANION GAP SERPL CALCULATED.3IONS-SCNC: 9 MMOL/L (ref 7–16)
ANISOCYTOSIS: ABNORMAL
AST SERPL-CCNC: 11 U/L (ref 0–31)
BASOPHILS # BLD: 0.04 E9/L (ref 0–0.2)
BASOPHILS NFR BLD: 0.5 % (ref 0–2)
BILIRUB SERPL-MCNC: 0.4 MG/DL (ref 0–1.2)
BLD GP AB SCN SERPL QL: NORMAL
BLOOD BANK DISPENSE STATUS: NORMAL
BLOOD BANK PRODUCT CODE: NORMAL
BLOOD GROUP ANTIBODIES SERPL: NORMAL
BPU ID: NORMAL
BUN SERPL-MCNC: 10 MG/DL (ref 6–20)
C (LITTLE) ANTIGEN: NORMAL
C ANTIGEN: NORMAL
CALCIUM SERPL-MCNC: 8.4 MG/DL (ref 8.6–10.2)
CHLORIDE SERPL-SCNC: 104 MMOL/L (ref 98–107)
CO2 SERPL-SCNC: 22 MMOL/L (ref 22–29)
CREAT SERPL-MCNC: 0.7 MG/DL (ref 0.5–1)
DAT POLY-SP REAG RBC QL: NORMAL
DESCRIPTION BLOOD BANK: NORMAL
DR. NOTIFY: NORMAL
E ANTIGEN: NORMAL
EOSINOPHIL # BLD: 0.17 E9/L (ref 0.05–0.5)
EOSINOPHIL NFR BLD: 2.3 % (ref 0–6)
ERYTHROCYTE [DISTWIDTH] IN BLOOD BY AUTOMATED COUNT: 18.7 FL (ref 11.5–15)
GLUCOSE SERPL-MCNC: 102 MG/DL (ref 74–99)
HCT VFR BLD AUTO: 24.3 % (ref 34–48)
HCT VFR BLD AUTO: 26.8 % (ref 34–48)
HGB BLD-MCNC: 6.6 G/DL (ref 11.5–15.5)
HGB BLD-MCNC: 7.4 G/DL (ref 11.5–15.5)
HYPOCHROMIA: ABNORMAL
IMM GRANULOCYTES # BLD: 0.03 E9/L
IMM GRANULOCYTES NFR BLD: 0.4 % (ref 0–5)
KELL ANTIGEN: NORMAL
LYMPHOCYTES # BLD: 2.35 E9/L (ref 1.5–4)
LYMPHOCYTES NFR BLD: 31.5 % (ref 20–42)
MAGNESIUM SERPL-MCNC: 2 MG/DL (ref 1.6–2.6)
MCH RBC QN AUTO: 18.5 PG (ref 26–35)
MCHC RBC AUTO-ENTMCNC: 27.2 % (ref 32–34.5)
MCV RBC AUTO: 68.1 FL (ref 80–99.9)
MONOCYTES # BLD: 0.52 E9/L (ref 0.1–0.95)
MONOCYTES NFR BLD: 7 % (ref 2–12)
NEUTROPHILS # BLD: 4.36 E9/L (ref 1.8–7.3)
NEUTS SEG NFR BLD: 58.3 % (ref 43–80)
OVALOCYTES: ABNORMAL
PLATELET # BLD AUTO: 319 E9/L (ref 130–450)
PMV BLD AUTO: 10.2 FL (ref 7–12)
POIKILOCYTES: ABNORMAL
POLYCHROMASIA: ABNORMAL
POTASSIUM SERPL-SCNC: 3.4 MMOL/L (ref 3.5–5)
PROT SERPL-MCNC: 6.4 G/DL (ref 6.4–8.3)
RBC # BLD AUTO: 3.57 E12/L (ref 3.5–5.5)
SODIUM SERPL-SCNC: 135 MMOL/L (ref 132–146)
TARGET CELLS: ABNORMAL
WBC # BLD: 7.5 E9/L (ref 4.5–11.5)

## 2023-05-19 PROCEDURE — 2580000003 HC RX 258: Performed by: FAMILY MEDICINE

## 2023-05-19 PROCEDURE — 6370000000 HC RX 637 (ALT 250 FOR IP): Performed by: FAMILY MEDICINE

## 2023-05-19 PROCEDURE — 85014 HEMATOCRIT: CPT

## 2023-05-19 PROCEDURE — 83735 ASSAY OF MAGNESIUM: CPT

## 2023-05-19 PROCEDURE — 6370000000 HC RX 637 (ALT 250 FOR IP): Performed by: PHYSICIAN ASSISTANT

## 2023-05-19 PROCEDURE — 86905 BLOOD TYPING RBC ANTIGENS: CPT

## 2023-05-19 PROCEDURE — 2580000003 HC RX 258: Performed by: INTERNAL MEDICINE

## 2023-05-19 PROCEDURE — 6370000000 HC RX 637 (ALT 250 FOR IP): Performed by: INTERNAL MEDICINE

## 2023-05-19 PROCEDURE — 86850 RBC ANTIBODY SCREEN: CPT

## 2023-05-19 PROCEDURE — 2140000000 HC CCU INTERMEDIATE R&B

## 2023-05-19 PROCEDURE — 85025 COMPLETE CBC W/AUTO DIFF WBC: CPT

## 2023-05-19 PROCEDURE — 86900 BLOOD TYPING SEROLOGIC ABO: CPT

## 2023-05-19 PROCEDURE — 30233N1 TRANSFUSION OF NONAUTOLOGOUS RED BLOOD CELLS INTO PERIPHERAL VEIN, PERCUTANEOUS APPROACH: ICD-10-PCS | Performed by: INTERNAL MEDICINE

## 2023-05-19 PROCEDURE — 6360000002 HC RX W HCPCS: Performed by: FAMILY MEDICINE

## 2023-05-19 PROCEDURE — 80053 COMPREHEN METABOLIC PANEL: CPT

## 2023-05-19 PROCEDURE — 86870 RBC ANTIBODY IDENTIFICATION: CPT

## 2023-05-19 PROCEDURE — 86901 BLOOD TYPING SEROLOGIC RH(D): CPT

## 2023-05-19 PROCEDURE — 86922 COMPATIBILITY TEST ANTIGLOB: CPT

## 2023-05-19 PROCEDURE — 86880 COOMBS TEST DIRECT: CPT

## 2023-05-19 PROCEDURE — 6360000002 HC RX W HCPCS: Performed by: INTERNAL MEDICINE

## 2023-05-19 PROCEDURE — 85018 HEMOGLOBIN: CPT

## 2023-05-19 PROCEDURE — 36415 COLL VENOUS BLD VENIPUNCTURE: CPT

## 2023-05-19 PROCEDURE — 6360000002 HC RX W HCPCS: Performed by: PHYSICIAN ASSISTANT

## 2023-05-19 PROCEDURE — P9016 RBC LEUKOCYTES REDUCED: HCPCS

## 2023-05-19 PROCEDURE — 36430 TRANSFUSION BLD/BLD COMPNT: CPT

## 2023-05-19 RX ORDER — DIPHENHYDRAMINE HYDROCHLORIDE 50 MG/ML
50 INJECTION INTRAMUSCULAR; INTRAVENOUS
OUTPATIENT
Start: 2023-05-19

## 2023-05-19 RX ORDER — SODIUM CHLORIDE 9 MG/ML
5-250 INJECTION, SOLUTION INTRAVENOUS PRN
OUTPATIENT
Start: 2023-05-19

## 2023-05-19 RX ORDER — POTASSIUM CHLORIDE 20 MEQ/1
40 TABLET, EXTENDED RELEASE ORAL ONCE
Status: COMPLETED | OUTPATIENT
Start: 2023-05-19 | End: 2023-05-19

## 2023-05-19 RX ORDER — EPINEPHRINE 1 MG/ML
0.3 INJECTION, SOLUTION, CONCENTRATE INTRAVENOUS PRN
OUTPATIENT
Start: 2023-05-19

## 2023-05-19 RX ORDER — LISINOPRIL 20 MG/1
20 TABLET ORAL DAILY
Qty: 30 TABLET | Refills: 0 | Status: SHIPPED | OUTPATIENT
Start: 2023-05-19

## 2023-05-19 RX ORDER — ALBUTEROL SULFATE 90 UG/1
4 AEROSOL, METERED RESPIRATORY (INHALATION) PRN
OUTPATIENT
Start: 2023-05-19

## 2023-05-19 RX ORDER — SODIUM FERRIC GLUCONATE COMPLEX IN SUCROSE 12.5 MG/ML
125 INJECTION INTRAVENOUS WEEKLY
Qty: 60 ML | Refills: 0
Start: 2023-05-19 | End: 2023-06-24

## 2023-05-19 RX ORDER — ACETAMINOPHEN 325 MG/1
650 TABLET ORAL
OUTPATIENT
Start: 2023-05-19

## 2023-05-19 RX ORDER — SODIUM CHLORIDE 9 MG/ML
INJECTION, SOLUTION INTRAVENOUS CONTINUOUS
OUTPATIENT
Start: 2023-05-19

## 2023-05-19 RX ORDER — ISOSORBIDE MONONITRATE 60 MG/1
60 TABLET, EXTENDED RELEASE ORAL DAILY
Qty: 30 TABLET | Refills: 0 | Status: SHIPPED | OUTPATIENT
Start: 2023-05-19

## 2023-05-19 RX ORDER — HEPARIN SODIUM (PORCINE) LOCK FLUSH IV SOLN 100 UNIT/ML 100 UNIT/ML
500 SOLUTION INTRAVENOUS PRN
OUTPATIENT
Start: 2023-05-19

## 2023-05-19 RX ORDER — FERROUS SULFATE 325(65) MG
325 TABLET ORAL
Qty: 30 TABLET | Refills: 0 | Status: SHIPPED | OUTPATIENT
Start: 2023-05-19

## 2023-05-19 RX ORDER — ONDANSETRON 2 MG/ML
8 INJECTION INTRAMUSCULAR; INTRAVENOUS
OUTPATIENT
Start: 2023-05-19

## 2023-05-19 RX ORDER — SODIUM CHLORIDE 0.9 % (FLUSH) 0.9 %
5-40 SYRINGE (ML) INJECTION PRN
OUTPATIENT
Start: 2023-05-19

## 2023-05-19 RX ORDER — MEPERIDINE HYDROCHLORIDE 25 MG/ML
25 INJECTION INTRAMUSCULAR; INTRAVENOUS; SUBCUTANEOUS ONCE
Start: 2023-05-19 | End: 2023-05-19

## 2023-05-19 RX ORDER — SODIUM CHLORIDE 9 MG/ML
INJECTION, SOLUTION INTRAVENOUS PRN
Status: DISCONTINUED | OUTPATIENT
Start: 2023-05-19 | End: 2023-05-20 | Stop reason: HOSPADM

## 2023-05-19 RX ORDER — SODIUM FERRIC GLUCONATE COMPLEX IN SUCROSE 12.5 MG/ML
125 INJECTION INTRAVENOUS DAILY
Qty: 60 ML | Refills: 0 | Status: SHIPPED | OUTPATIENT
Start: 2023-05-22 | End: 2023-05-19 | Stop reason: SDUPTHER

## 2023-05-19 RX ADMIN — LISINOPRIL 20 MG: 20 TABLET ORAL at 10:19

## 2023-05-19 RX ADMIN — POTASSIUM CHLORIDE 40 MEQ: 1500 TABLET, EXTENDED RELEASE ORAL at 17:36

## 2023-05-19 RX ADMIN — ASPIRIN 81 MG CHEWABLE TABLET 81 MG: 81 TABLET CHEWABLE at 10:19

## 2023-05-19 RX ADMIN — ACETAMINOPHEN 650 MG: 325 TABLET ORAL at 04:21

## 2023-05-19 RX ADMIN — ISOSORBIDE MONONITRATE 60 MG: 30 TABLET, EXTENDED RELEASE ORAL at 10:18

## 2023-05-19 RX ADMIN — ENOXAPARIN SODIUM 40 MG: 100 INJECTION SUBCUTANEOUS at 10:18

## 2023-05-19 RX ADMIN — HYDRALAZINE HYDROCHLORIDE 10 MG: 20 INJECTION INTRAMUSCULAR; INTRAVENOUS at 04:15

## 2023-05-19 RX ADMIN — SODIUM CHLORIDE 125 MG: 9 INJECTION, SOLUTION INTRAVENOUS at 10:18

## 2023-05-19 RX ADMIN — ATORVASTATIN CALCIUM 40 MG: 40 TABLET, FILM COATED ORAL at 20:55

## 2023-05-19 RX ADMIN — CARVEDILOL 12.5 MG: 6.25 TABLET, FILM COATED ORAL at 17:36

## 2023-05-19 RX ADMIN — CARVEDILOL 12.5 MG: 6.25 TABLET, FILM COATED ORAL at 10:18

## 2023-05-19 RX ADMIN — SODIUM CHLORIDE, PRESERVATIVE FREE 10 ML: 5 INJECTION INTRAVENOUS at 10:19

## 2023-05-19 ASSESSMENT — PAIN DESCRIPTION - LOCATION: LOCATION: HEAD

## 2023-05-19 ASSESSMENT — PAIN SCALES - GENERAL
PAINLEVEL_OUTOF10: 9
PAINLEVEL_OUTOF10: 0
PAINLEVEL_OUTOF10: 9

## 2023-05-19 NOTE — CARE COORDINATION
05/19/23 DENIZ Note: Contact made with Jory Nguyen at [facility]. Patient to have iv ferrelicit as outpatient. Forms for Iron to be faxed to [facility] to be filled out and faxed back to Infusion center at fax [phone].  Electronically signed by Karen Almeida RN CM on 5/19/2023 at 10:01 AM

## 2023-05-19 NOTE — CARE COORDINATION
05/19/23 Transition of care Note: Patient is observation due to c/o chest pain. She was found to have low blood count. She is alert and oriented. She resided in a one story home with her SO. She is independent. Infusion center received iv ferrelicit orders. Per Jeffrey Castaneda the infusion center will call the patient and set up the scheduled for the infusion. She is currently to receive blood today. She will receive iv ferrelicit today as well. She will possibly discharge this pm per pcp. H/H ordered to be completed post transfusion and reported. Electronically signed by Ute Hernandez RN CM on 5/19/2023 at 11:23 AM     Case Management Assessment  Initial Evaluation    Date/Time of Evaluation: 5/19/2023 11:25 AM  Assessment Completed by: Ute Hernandez RN    If patient is discharged prior to next notation, then this note serves as note for discharge by case management. Patient Name: Ashlyn Davidson                   YOB: 1975  Diagnosis: Chronic anemia [D64.9]  Chest pain in adult [R07.9]  Chest pain, unspecified type [R07.9]                   Date / Time: 5/18/2023  3:55 AM    Patient Admission Status: Observation   Readmission Risk (Low < 19, Mod (19-27), High > 27): Readmission Risk Score: 10.6    Current PCP: Asia Irvin, DO  PCP verified by ? Yes    Chart Reviewed: Yes      History Provided by: Patient  Patient Orientation: Alert and Oriented    Patient Cognition: Alert    Hospitalization in the last 30 days (Readmission):  Yes    If yes, Readmission Assessment in  Navigator will be completed.     Advance Directives:      Code Status: Full Code   Patient's Primary Decision Maker is: Patient Declined (Legal Next of Kin Remains as Decision Maker)      Discharge Planning:    Patient lives with: Spouse/Significant Other Type of Home: House  Primary Care Giver: Self  Patient Support Systems include: Spouse/Significant Other   Current Financial resources:    Current community resources:

## 2023-05-19 NOTE — CONSENT
Informed Consent for Blood Component Transfusion Note    I have discussed with the patient the rationale for blood component transfusion; its benefits in treating or preventing fatigue, organ damage, or death; and its risk which includes mild transfusion reactions, rare risk of blood borne infection, or more serious but rare reactions. I have discussed the alternatives to transfusion, including the risk and consequences of not receiving transfusion. The patient had an opportunity to ask questions and had agreed to proceed with transfusion of blood components.     Electronically signed by Jamee Ferrer MD on 5/19/23 at 9:43 AM EDT

## 2023-05-20 NOTE — DISCHARGE SUMMARY
HISTORY: Reason for exam:->dizzy, hypertension Has a \"code stroke\" or \"stroke alert\" been called? ->No Decision Support Exception - unselect if not a suspected or confirmed emergency medical condition->Emergency Medical Condition (MA) What reading provider will be dictating this exam?->CRC FINDINGS: BRAIN/VENTRICLES: There is no acute intracranial hemorrhage, mass effect or midline shift. No abnormal extra-axial fluid collection. The gray-white differentiation is maintained without evidence of an acute infarct. There is no evidence of hydrocephalus. ORBITS: The visualized portion of the orbits demonstrate no acute abnormality. SINUSES: The visualized paranasal sinuses and mastoid air cells demonstrate no acute abnormality. SOFT TISSUES/SKULL:  No acute abnormality of the visualized skull or soft tissues. No acute intracranial abnormality. RECOMMENDATIONS: Careful clinical correlation and follow up recommended. XR CHEST PORTABLE    Result Date: 5/18/2023  EXAMINATION: ONE XRAY VIEW OF THE CHEST 5/18/2023 12:42 am COMPARISON: /1/23 HISTORY: ORDERING SYSTEM PROVIDED HISTORY: chest pain TECHNOLOGIST PROVIDED HISTORY: Reason for exam:->chest pain What reading provider will be dictating this exam?->CRC FINDINGS: The lungs are without acute focal process. There is no effusion or pneumothorax. Cardiomegaly. The osseous structures are without acute process. No acute process. XR CHEST PORTABLE    Result Date: 4/30/2023  EXAMINATION: ONE XRAY VIEW OF THE CHEST 4/30/2023 1:31 am COMPARISON: None. HISTORY: ORDERING SYSTEM PROVIDED HISTORY: eval chf TECHNOLOGIST PROVIDED HISTORY: Reason for exam:->eval chf What reading provider will be dictating this exam?->CRC FINDINGS: Normal cardiomediastinal silhouette. Lungs clear. No pneumothorax or effusion. Body wall soft tissues unremarkable. Osseous thorax intact. No acute disease. RECOMMENDATION: Careful clinical correlation and follow up recommended.      CTA

## 2023-06-05 ENCOUNTER — HOSPITAL ENCOUNTER (OUTPATIENT)
Dept: INFUSION THERAPY | Age: 48
Setting detail: INFUSION SERIES
Discharge: HOME | End: 2023-06-05
Payer: COMMERCIAL

## 2023-06-05 VITALS
RESPIRATION RATE: 16 BRPM | OXYGEN SATURATION: 98 % | HEART RATE: 58 BPM | DIASTOLIC BLOOD PRESSURE: 66 MMHG | SYSTOLIC BLOOD PRESSURE: 143 MMHG | TEMPERATURE: 98.3 F

## 2023-06-05 DIAGNOSIS — D50.9 IRON DEFICIENCY ANEMIA, UNSPECIFIED IRON DEFICIENCY ANEMIA TYPE: Primary | ICD-10-CM

## 2023-06-05 PROCEDURE — 96365 THER/PROPH/DIAG IV INF INIT: CPT

## 2023-06-05 PROCEDURE — 6360000002 HC RX W HCPCS: Performed by: INTERNAL MEDICINE

## 2023-06-05 PROCEDURE — 2580000003 HC RX 258: Performed by: INTERNAL MEDICINE

## 2023-06-05 RX ORDER — ONDANSETRON 2 MG/ML
8 INJECTION INTRAMUSCULAR; INTRAVENOUS
OUTPATIENT
Start: 2023-06-12

## 2023-06-05 RX ORDER — SODIUM CHLORIDE 0.9 % (FLUSH) 0.9 %
5-40 SYRINGE (ML) INJECTION PRN
OUTPATIENT
Start: 2023-06-12

## 2023-06-05 RX ORDER — DIPHENHYDRAMINE HYDROCHLORIDE 50 MG/ML
50 INJECTION INTRAMUSCULAR; INTRAVENOUS
OUTPATIENT
Start: 2023-06-12

## 2023-06-05 RX ORDER — SODIUM CHLORIDE 9 MG/ML
5-250 INJECTION, SOLUTION INTRAVENOUS PRN
OUTPATIENT
Start: 2023-06-12

## 2023-06-05 RX ORDER — ACETAMINOPHEN 325 MG/1
650 TABLET ORAL
OUTPATIENT
Start: 2023-06-12

## 2023-06-05 RX ORDER — MEPERIDINE HYDROCHLORIDE 25 MG/ML
25 INJECTION INTRAMUSCULAR; INTRAVENOUS; SUBCUTANEOUS ONCE
Start: 2023-06-12 | End: 2023-06-12

## 2023-06-05 RX ORDER — SODIUM CHLORIDE 9 MG/ML
5-250 INJECTION, SOLUTION INTRAVENOUS PRN
Status: DISCONTINUED | OUTPATIENT
Start: 2023-06-05 | End: 2023-06-06 | Stop reason: HOSPADM

## 2023-06-05 RX ORDER — SODIUM CHLORIDE 0.9 % (FLUSH) 0.9 %
5-40 SYRINGE (ML) INJECTION PRN
Status: DISCONTINUED | OUTPATIENT
Start: 2023-06-05 | End: 2023-06-06 | Stop reason: HOSPADM

## 2023-06-05 RX ORDER — SODIUM CHLORIDE 9 MG/ML
INJECTION, SOLUTION INTRAVENOUS CONTINUOUS
OUTPATIENT
Start: 2023-06-12

## 2023-06-05 RX ORDER — HEPARIN SODIUM (PORCINE) LOCK FLUSH IV SOLN 100 UNIT/ML 100 UNIT/ML
500 SOLUTION INTRAVENOUS PRN
Status: DISCONTINUED | OUTPATIENT
Start: 2023-06-05 | End: 2023-06-06 | Stop reason: HOSPADM

## 2023-06-05 RX ORDER — ALBUTEROL SULFATE 90 UG/1
4 AEROSOL, METERED RESPIRATORY (INHALATION) PRN
OUTPATIENT
Start: 2023-06-12

## 2023-06-05 RX ORDER — HEPARIN SODIUM (PORCINE) LOCK FLUSH IV SOLN 100 UNIT/ML 100 UNIT/ML
500 SOLUTION INTRAVENOUS PRN
Status: CANCELLED | OUTPATIENT
Start: 2023-06-12

## 2023-06-05 RX ORDER — EPINEPHRINE 1 MG/ML
0.3 INJECTION, SOLUTION, CONCENTRATE INTRAVENOUS PRN
OUTPATIENT
Start: 2023-06-12

## 2023-06-05 RX ADMIN — SODIUM CHLORIDE, PRESERVATIVE FREE 10 ML: 5 INJECTION INTRAVENOUS at 13:56

## 2023-06-05 RX ADMIN — SODIUM CHLORIDE 125 MG: 9 INJECTION, SOLUTION INTRAVENOUS at 13:56

## 2023-06-05 NOTE — DISCHARGE INSTRUCTIONS
sodium ferric gluconate complex  Pronunciation:  JAX house um HALLIE ik GLU koe vlad KOM plex  Brand:  Ferrlecit, Nulecit  What is sodium ferric gluconate complex? Sodium ferric gluconate is a type of iron. You normally get iron from the foods you eat. In your body, iron becomes a part of your hemoglobin (HEEM o deena bin) and myoglobin (MY o deena bin). Hemoglobin carries oxygen through your blood to tissues and organs. Myoglobin helps your muscle cells store oxygen. Sodium ferric gluconate complex is used to treat iron deficiency anemia (a lack of red blood cells caused by having too little iron in the body) in adults and children who are at least 10years old. Sodium ferric gluconate complex is for people with kidney disease who are on dialysis. Sodium ferric gluconate complex may also be used for purposes not listed in this medication guide. What should I discuss with my healthcare provider before taking sodium ferric gluconate complex? You should not use this medication if you are allergic to sodium ferric gluconate complex or benzyl alcohol. To make sure you can safely use sodium ferric gluconate complex, tell your doctor if you have any of these other conditions:  iron overload syndrome; or  if you receive regular blood transfusions. FDA pregnancy category B. Sodium ferric gluconate complex is not expected to harm an unborn baby. Tell your doctor if you are pregnant or plan to become pregnant during treatment. It is not known whether sodium ferric gluconate complex passes into breast milk or if it could harm a nursing baby. Do not use this medication without telling your doctor if you are breast-feeding a baby. Sodium ferric gluconate complex should not be given to a child younger than 10years old. How should I take sodium ferric gluconate complex? Sodium ferric gluconate complex is injected into a vein through an IV during your dialysis session.  This medication must be given slowly, and the IV infusion

## 2023-06-05 NOTE — FLOWSHEET NOTE
Discharged to home in stable condition , tolerated infusion well, all questions answered, VS stable, dc instructions printed and given to pt.

## 2023-06-12 ENCOUNTER — HOSPITAL ENCOUNTER (OUTPATIENT)
Dept: INFUSION THERAPY | Age: 48
Setting detail: INFUSION SERIES
Discharge: HOME | End: 2023-06-12
Payer: COMMERCIAL

## 2023-06-12 VITALS
SYSTOLIC BLOOD PRESSURE: 156 MMHG | HEART RATE: 51 BPM | TEMPERATURE: 97.2 F | OXYGEN SATURATION: 100 % | RESPIRATION RATE: 16 BRPM | DIASTOLIC BLOOD PRESSURE: 76 MMHG

## 2023-06-12 DIAGNOSIS — D50.9 IRON DEFICIENCY ANEMIA, UNSPECIFIED IRON DEFICIENCY ANEMIA TYPE: Primary | ICD-10-CM

## 2023-06-12 PROCEDURE — 2580000003 HC RX 258: Performed by: INTERNAL MEDICINE

## 2023-06-12 PROCEDURE — 6360000002 HC RX W HCPCS: Performed by: INTERNAL MEDICINE

## 2023-06-12 PROCEDURE — 96365 THER/PROPH/DIAG IV INF INIT: CPT

## 2023-06-12 RX ORDER — SODIUM CHLORIDE 9 MG/ML
5-250 INJECTION, SOLUTION INTRAVENOUS PRN
Status: DISCONTINUED | OUTPATIENT
Start: 2023-06-12 | End: 2023-06-13 | Stop reason: HOSPADM

## 2023-06-12 RX ORDER — EPINEPHRINE 1 MG/ML
0.3 INJECTION, SOLUTION, CONCENTRATE INTRAVENOUS PRN
Status: CANCELLED | OUTPATIENT
Start: 2023-06-19

## 2023-06-12 RX ORDER — DIPHENHYDRAMINE HYDROCHLORIDE 50 MG/ML
50 INJECTION INTRAMUSCULAR; INTRAVENOUS
Status: CANCELLED | OUTPATIENT
Start: 2023-06-19

## 2023-06-12 RX ORDER — ALBUTEROL SULFATE 90 UG/1
4 AEROSOL, METERED RESPIRATORY (INHALATION) PRN
Status: CANCELLED | OUTPATIENT
Start: 2023-06-19

## 2023-06-12 RX ORDER — ACETAMINOPHEN 325 MG/1
650 TABLET ORAL
Status: CANCELLED | OUTPATIENT
Start: 2023-06-19

## 2023-06-12 RX ORDER — SODIUM CHLORIDE 9 MG/ML
5-250 INJECTION, SOLUTION INTRAVENOUS PRN
Status: CANCELLED | OUTPATIENT
Start: 2023-06-19

## 2023-06-12 RX ORDER — HEPARIN SODIUM 100 [USP'U]/ML
500 INJECTION, SOLUTION INTRAVENOUS PRN
Status: CANCELLED | OUTPATIENT
Start: 2023-06-19

## 2023-06-12 RX ORDER — MEPERIDINE HYDROCHLORIDE 25 MG/ML
25 INJECTION INTRAMUSCULAR; INTRAVENOUS; SUBCUTANEOUS ONCE
Status: CANCELLED
Start: 2023-06-19 | End: 2023-06-19

## 2023-06-12 RX ORDER — SODIUM CHLORIDE 9 MG/ML
INJECTION, SOLUTION INTRAVENOUS CONTINUOUS
Status: CANCELLED | OUTPATIENT
Start: 2023-06-19

## 2023-06-12 RX ORDER — ONDANSETRON 2 MG/ML
8 INJECTION INTRAMUSCULAR; INTRAVENOUS
Status: CANCELLED | OUTPATIENT
Start: 2023-06-19

## 2023-06-12 RX ORDER — SODIUM CHLORIDE 0.9 % (FLUSH) 0.9 %
5-40 SYRINGE (ML) INJECTION PRN
Status: CANCELLED | OUTPATIENT
Start: 2023-06-19

## 2023-06-12 RX ORDER — SODIUM CHLORIDE 0.9 % (FLUSH) 0.9 %
5-40 SYRINGE (ML) INJECTION PRN
Status: DISCONTINUED | OUTPATIENT
Start: 2023-06-12 | End: 2023-06-13 | Stop reason: HOSPADM

## 2023-06-12 RX ADMIN — SODIUM CHLORIDE 30 ML: 9 INJECTION, SOLUTION INTRAVENOUS at 15:04

## 2023-06-12 RX ADMIN — SODIUM CHLORIDE 125 MG: 9 INJECTION, SOLUTION INTRAVENOUS at 13:55

## 2023-06-12 RX ADMIN — SODIUM CHLORIDE, PRESERVATIVE FREE 10 ML: 5 INJECTION INTRAVENOUS at 13:54

## 2023-06-12 ASSESSMENT — PAIN DESCRIPTION - PAIN TYPE: TYPE: CHRONIC PAIN

## 2023-06-12 ASSESSMENT — PAIN DESCRIPTION - DIRECTION: RADIATING_TOWARDS: NON RADIATING

## 2023-06-12 ASSESSMENT — PAIN DESCRIPTION - LOCATION: LOCATION: CHEST

## 2023-06-12 ASSESSMENT — PAIN DESCRIPTION - ORIENTATION: ORIENTATION: MID

## 2023-06-12 ASSESSMENT — PAIN DESCRIPTION - ONSET: ONSET: ON-GOING

## 2023-06-12 ASSESSMENT — PAIN DESCRIPTION - FREQUENCY: FREQUENCY: CONTINUOUS

## 2023-06-12 ASSESSMENT — PAIN SCALES - GENERAL: PAINLEVEL_OUTOF10: 9

## 2023-06-12 ASSESSMENT — PAIN DESCRIPTION - DESCRIPTORS: DESCRIPTORS: PRESSURE;DISCOMFORT

## 2023-06-12 ASSESSMENT — PAIN - FUNCTIONAL ASSESSMENT: PAIN_FUNCTIONAL_ASSESSMENT: PREVENTS OR INTERFERES SOME ACTIVE ACTIVITIES AND ADLS

## 2023-06-12 NOTE — PROGRESS NOTES
Patient tolerated ferrlecit well. Remained on unit for 25 minutes after treatment. Patient alert and oriented x3. No distress noted. Vital signs stable. Patient denies any new or worsening pain. Offered patient education and/or discharge material. Patient declined. Patient denies any needs. All questions answered. D/C in stable condition.

## 2023-06-19 ENCOUNTER — HOSPITAL ENCOUNTER (OUTPATIENT)
Dept: INFUSION THERAPY | Age: 48
Setting detail: INFUSION SERIES
Discharge: HOME | End: 2023-06-19
Payer: COMMERCIAL

## 2023-06-19 VITALS
TEMPERATURE: 97.9 F | DIASTOLIC BLOOD PRESSURE: 65 MMHG | SYSTOLIC BLOOD PRESSURE: 151 MMHG | OXYGEN SATURATION: 100 % | HEART RATE: 58 BPM | RESPIRATION RATE: 12 BRPM

## 2023-06-19 DIAGNOSIS — D50.9 IRON DEFICIENCY ANEMIA, UNSPECIFIED IRON DEFICIENCY ANEMIA TYPE: Primary | ICD-10-CM

## 2023-06-19 PROCEDURE — 2580000003 HC RX 258: Performed by: INTERNAL MEDICINE

## 2023-06-19 PROCEDURE — 6360000002 HC RX W HCPCS: Performed by: INTERNAL MEDICINE

## 2023-06-19 PROCEDURE — 96365 THER/PROPH/DIAG IV INF INIT: CPT

## 2023-06-19 RX ORDER — SODIUM CHLORIDE 0.9 % (FLUSH) 0.9 %
5-40 SYRINGE (ML) INJECTION PRN
Status: DISCONTINUED | OUTPATIENT
Start: 2023-06-19 | End: 2023-06-20 | Stop reason: HOSPADM

## 2023-06-19 RX ORDER — ONDANSETRON 2 MG/ML
8 INJECTION INTRAMUSCULAR; INTRAVENOUS
Status: CANCELLED | OUTPATIENT
Start: 2023-06-26

## 2023-06-19 RX ORDER — SODIUM CHLORIDE 9 MG/ML
5-250 INJECTION, SOLUTION INTRAVENOUS PRN
Status: CANCELLED | OUTPATIENT
Start: 2023-06-26

## 2023-06-19 RX ORDER — HEPARIN SODIUM 100 [USP'U]/ML
500 INJECTION, SOLUTION INTRAVENOUS PRN
Status: CANCELLED | OUTPATIENT
Start: 2023-06-26

## 2023-06-19 RX ORDER — EPINEPHRINE 1 MG/ML
0.3 INJECTION, SOLUTION, CONCENTRATE INTRAVENOUS PRN
Status: CANCELLED | OUTPATIENT
Start: 2023-06-26

## 2023-06-19 RX ORDER — MEPERIDINE HYDROCHLORIDE 25 MG/ML
25 INJECTION INTRAMUSCULAR; INTRAVENOUS; SUBCUTANEOUS ONCE
Status: CANCELLED
Start: 2023-06-26 | End: 2023-06-26

## 2023-06-19 RX ORDER — SODIUM CHLORIDE 9 MG/ML
5-250 INJECTION, SOLUTION INTRAVENOUS PRN
Status: DISCONTINUED | OUTPATIENT
Start: 2023-06-19 | End: 2023-06-20 | Stop reason: HOSPADM

## 2023-06-19 RX ORDER — ACETAMINOPHEN 325 MG/1
650 TABLET ORAL
Status: CANCELLED | OUTPATIENT
Start: 2023-06-26

## 2023-06-19 RX ORDER — DIPHENHYDRAMINE HYDROCHLORIDE 50 MG/ML
50 INJECTION INTRAMUSCULAR; INTRAVENOUS
Status: CANCELLED | OUTPATIENT
Start: 2023-06-26

## 2023-06-19 RX ORDER — ALBUTEROL SULFATE 90 UG/1
4 AEROSOL, METERED RESPIRATORY (INHALATION) PRN
Status: CANCELLED | OUTPATIENT
Start: 2023-06-26

## 2023-06-19 RX ORDER — SODIUM CHLORIDE 0.9 % (FLUSH) 0.9 %
5-40 SYRINGE (ML) INJECTION PRN
Status: CANCELLED | OUTPATIENT
Start: 2023-06-26

## 2023-06-19 RX ORDER — SODIUM CHLORIDE 9 MG/ML
INJECTION, SOLUTION INTRAVENOUS CONTINUOUS
Status: CANCELLED | OUTPATIENT
Start: 2023-06-26

## 2023-06-19 RX ADMIN — SODIUM CHLORIDE, PRESERVATIVE FREE 10 ML: 5 INJECTION INTRAVENOUS at 13:49

## 2023-06-19 RX ADMIN — SODIUM CHLORIDE 125 MG: 9 INJECTION, SOLUTION INTRAVENOUS at 13:50

## 2023-06-19 RX ADMIN — SODIUM CHLORIDE 30 ML: 9 INJECTION, SOLUTION INTRAVENOUS at 14:44

## 2023-06-19 NOTE — PROGRESS NOTES
Patient tolerated ferrlecit infusion well. Patient alert and oriented x3. No distress noted. Vital signs stable. Patient denies any new or worsening pain. Offered patient education and/or discharge material. Patient declined. Patient denies any needs. All questions answered. D/C in stable condition.

## 2023-06-20 ENCOUNTER — OFFICE VISIT (OUTPATIENT)
Dept: OBGYN | Age: 48
End: 2023-06-20
Payer: COMMERCIAL

## 2023-06-20 VITALS
SYSTOLIC BLOOD PRESSURE: 159 MMHG | HEIGHT: 67 IN | HEART RATE: 61 BPM | BODY MASS INDEX: 37.2 KG/M2 | WEIGHT: 237 LBS | DIASTOLIC BLOOD PRESSURE: 75 MMHG

## 2023-06-20 DIAGNOSIS — N83.299 COMPLEX OVARIAN CYST: ICD-10-CM

## 2023-06-20 DIAGNOSIS — D21.9 FIBROIDS: Primary | ICD-10-CM

## 2023-06-20 PROCEDURE — 99213 OFFICE O/P EST LOW 20 MIN: CPT | Performed by: ADVANCED PRACTICE MIDWIFE

## 2023-06-20 PROCEDURE — 3078F DIAST BP <80 MM HG: CPT | Performed by: ADVANCED PRACTICE MIDWIFE

## 2023-06-20 PROCEDURE — G8427 DOCREV CUR MEDS BY ELIG CLIN: HCPCS | Performed by: ADVANCED PRACTICE MIDWIFE

## 2023-06-20 PROCEDURE — 4004F PT TOBACCO SCREEN RCVD TLK: CPT | Performed by: ADVANCED PRACTICE MIDWIFE

## 2023-06-20 PROCEDURE — G8417 CALC BMI ABV UP PARAM F/U: HCPCS | Performed by: ADVANCED PRACTICE MIDWIFE

## 2023-06-20 PROCEDURE — 3077F SYST BP >= 140 MM HG: CPT | Performed by: ADVANCED PRACTICE MIDWIFE

## 2023-06-20 ASSESSMENT — ENCOUNTER SYMPTOMS
RESPIRATORY NEGATIVE: 1
ABDOMINAL DISTENTION: 1

## 2023-06-20 NOTE — PROGRESS NOTES
New patient alert and pleasant with concerns about heavy vaginal bleeding and CT results. Last PAP years ago with normal results  Unable to obtain PAP due to vaginal bleeding   Discharge instructions have been discussed with the patient. Patient advised to call our office with any questions or concerns. Voiced understanding.

## 2023-06-20 NOTE — PROGRESS NOTES
Here today to follow up findings of Complex Ovarian Cyst and Fibroids during ER visit to evaluate severe Anemia. CC: On 4/30 she was having \"Heart Problems\" hand  went to the ER. She was told she has \"Left side heart issues\" and severe hypertension  Also her \"Iron was very low- I think a 7\". During the evaluation for the Anemia, an abdominal CT was ordered which showed:   Ovarian Cyst and Fibroids: \"IMPRESSION:   Other: 6.2 cm complicated cystic appearance of the left ovary/adnexa. No  significant surrounding free fluid or infiltration. Bulky leiomyomatous  uterus measuring 12.3 cm in length. IMPRESSION:  1. No acute disease. No acute aortic process or dissection. No pulmonary  emboli detected with adequate pulmonary arterial contrast present. 2. 6.2 cm complicated cystic appearance of the left ovary/adnexa. No  significant surrounding free fluid or infiltration. 3. Bulky leiomyomatous uterus measuring 12.3 cm in length. 4. Appendix not visible. No pericecal inflammatory change to suggest occult  appendicitis. RECOMMENDATIONS:  Careful clinical correlation and follow up recommended. Pathology: 6.2 cm left ovarian indeterminate cyst.BPR is not applicable for  complex cysts. Provide F/U management at your discretion. Reference: JACR 2020 Feb;17(2):248-254\"    LMP Currently Bleeding. Menses:  Since October they have been very heavy, lasting 14 days, will have 14 days off, and the bleeding will start again. Since February she has been having increased pressure in her abdomen. Gyn symptoms:  menorrhagia (severe), menstrual cramping (severe), and Long history of not using any contraception and somehow still not getting pregnant. Nulligravida. Sexual activity:  is sexually active  Menopausal symptoms:  menopausal symptoms: hot flashes, night sweats, and mood problems Since Age 34 \"My other doctor did blood tests and stuff and told me I was perimenopausal then\".    Breast symptoms:

## 2023-06-22 ENCOUNTER — HOSPITAL ENCOUNTER (OUTPATIENT)
Age: 48
Discharge: HOME | End: 2023-06-22
Payer: COMMERCIAL

## 2023-06-22 ENCOUNTER — HOSPITAL ENCOUNTER (OUTPATIENT)
Dept: ULTRASOUND IMAGING | Age: 48
Discharge: HOME | End: 2023-06-22
Payer: COMMERCIAL

## 2023-06-22 DIAGNOSIS — D21.9 FIBROIDS: ICD-10-CM

## 2023-06-22 DIAGNOSIS — N83.299 COMPLEX OVARIAN CYST: ICD-10-CM

## 2023-06-22 LAB — CANCER AG125 SERPL-ACNC: 44.4 U/ML (ref 0–35)

## 2023-06-22 PROCEDURE — 36415 COLL VENOUS BLD VENIPUNCTURE: CPT

## 2023-06-22 PROCEDURE — 76856 US EXAM PELVIC COMPLETE: CPT

## 2023-06-22 PROCEDURE — 86304 IMMUNOASSAY TUMOR CA 125: CPT

## 2023-06-26 ENCOUNTER — HOSPITAL ENCOUNTER (OUTPATIENT)
Dept: INFUSION THERAPY | Age: 48
Setting detail: INFUSION SERIES
Discharge: HOME | End: 2023-06-26
Payer: COMMERCIAL

## 2023-06-26 VITALS
SYSTOLIC BLOOD PRESSURE: 172 MMHG | DIASTOLIC BLOOD PRESSURE: 85 MMHG | HEART RATE: 55 BPM | OXYGEN SATURATION: 96 % | RESPIRATION RATE: 12 BRPM | TEMPERATURE: 98.3 F

## 2023-06-26 DIAGNOSIS — D50.9 IRON DEFICIENCY ANEMIA, UNSPECIFIED IRON DEFICIENCY ANEMIA TYPE: Primary | ICD-10-CM

## 2023-06-26 PROCEDURE — 96365 THER/PROPH/DIAG IV INF INIT: CPT

## 2023-06-26 PROCEDURE — 6360000002 HC RX W HCPCS: Performed by: INTERNAL MEDICINE

## 2023-06-26 PROCEDURE — 2580000003 HC RX 258: Performed by: INTERNAL MEDICINE

## 2023-06-26 RX ORDER — ALBUTEROL SULFATE 90 UG/1
4 AEROSOL, METERED RESPIRATORY (INHALATION) PRN
Status: CANCELLED | OUTPATIENT
Start: 2023-07-03

## 2023-06-26 RX ORDER — SODIUM CHLORIDE 9 MG/ML
5-250 INJECTION, SOLUTION INTRAVENOUS PRN
Status: CANCELLED | OUTPATIENT
Start: 2023-07-03

## 2023-06-26 RX ORDER — ONDANSETRON 2 MG/ML
8 INJECTION INTRAMUSCULAR; INTRAVENOUS
Status: CANCELLED | OUTPATIENT
Start: 2023-07-03

## 2023-06-26 RX ORDER — MEPERIDINE HYDROCHLORIDE 25 MG/ML
25 INJECTION INTRAMUSCULAR; INTRAVENOUS; SUBCUTANEOUS ONCE
Status: CANCELLED
Start: 2023-07-03 | End: 2023-07-03

## 2023-06-26 RX ORDER — ACETAMINOPHEN 325 MG/1
650 TABLET ORAL
Status: CANCELLED | OUTPATIENT
Start: 2023-07-03

## 2023-06-26 RX ORDER — DIPHENHYDRAMINE HYDROCHLORIDE 50 MG/ML
50 INJECTION INTRAMUSCULAR; INTRAVENOUS
Status: CANCELLED | OUTPATIENT
Start: 2023-07-03

## 2023-06-26 RX ORDER — SODIUM CHLORIDE 9 MG/ML
5-250 INJECTION, SOLUTION INTRAVENOUS PRN
Status: DISCONTINUED | OUTPATIENT
Start: 2023-06-26 | End: 2023-06-27 | Stop reason: HOSPADM

## 2023-06-26 RX ORDER — SODIUM CHLORIDE 0.9 % (FLUSH) 0.9 %
5-40 SYRINGE (ML) INJECTION PRN
Status: CANCELLED | OUTPATIENT
Start: 2023-07-03

## 2023-06-26 RX ORDER — SODIUM CHLORIDE 0.9 % (FLUSH) 0.9 %
5-40 SYRINGE (ML) INJECTION PRN
Status: DISCONTINUED | OUTPATIENT
Start: 2023-06-26 | End: 2023-06-27 | Stop reason: HOSPADM

## 2023-06-26 RX ORDER — HEPARIN SODIUM 100 [USP'U]/ML
500 INJECTION, SOLUTION INTRAVENOUS PRN
Status: CANCELLED | OUTPATIENT
Start: 2023-07-03

## 2023-06-26 RX ORDER — SODIUM CHLORIDE 9 MG/ML
INJECTION, SOLUTION INTRAVENOUS CONTINUOUS
Status: CANCELLED | OUTPATIENT
Start: 2023-07-03

## 2023-06-26 RX ORDER — EPINEPHRINE 1 MG/ML
0.3 INJECTION, SOLUTION, CONCENTRATE INTRAVENOUS PRN
Status: CANCELLED | OUTPATIENT
Start: 2023-07-03

## 2023-06-26 RX ADMIN — SODIUM CHLORIDE 30 ML: 9 INJECTION, SOLUTION INTRAVENOUS at 14:52

## 2023-06-26 RX ADMIN — SODIUM CHLORIDE, PRESERVATIVE FREE 10 ML: 5 INJECTION INTRAVENOUS at 13:54

## 2023-06-26 RX ADMIN — SODIUM CHLORIDE 125 MG: 9 INJECTION, SOLUTION INTRAVENOUS at 13:55

## 2023-06-27 ENCOUNTER — TELEPHONE (OUTPATIENT)
Dept: OBGYN | Age: 48
End: 2023-06-27

## 2023-06-28 ENCOUNTER — TELEPHONE (OUTPATIENT)
Dept: OBGYN | Age: 48
End: 2023-06-28

## 2023-07-03 ENCOUNTER — HOSPITAL ENCOUNTER (OUTPATIENT)
Dept: INFUSION THERAPY | Age: 48
Setting detail: INFUSION SERIES
Discharge: HOME | End: 2023-07-03
Payer: COMMERCIAL

## 2023-07-03 VITALS
HEART RATE: 64 BPM | SYSTOLIC BLOOD PRESSURE: 170 MMHG | OXYGEN SATURATION: 98 % | TEMPERATURE: 97.8 F | DIASTOLIC BLOOD PRESSURE: 81 MMHG | RESPIRATION RATE: 12 BRPM

## 2023-07-03 DIAGNOSIS — D50.9 IRON DEFICIENCY ANEMIA, UNSPECIFIED IRON DEFICIENCY ANEMIA TYPE: Primary | ICD-10-CM

## 2023-07-03 PROCEDURE — 6360000002 HC RX W HCPCS: Performed by: INTERNAL MEDICINE

## 2023-07-03 PROCEDURE — 96365 THER/PROPH/DIAG IV INF INIT: CPT

## 2023-07-03 PROCEDURE — 2580000003 HC RX 258: Performed by: INTERNAL MEDICINE

## 2023-07-03 RX ORDER — SODIUM CHLORIDE 9 MG/ML
5-250 INJECTION, SOLUTION INTRAVENOUS PRN
Status: DISCONTINUED | OUTPATIENT
Start: 2023-07-03 | End: 2023-07-04 | Stop reason: HOSPADM

## 2023-07-03 RX ORDER — EPINEPHRINE 1 MG/ML
0.3 INJECTION, SOLUTION, CONCENTRATE INTRAVENOUS PRN
OUTPATIENT
Start: 2023-07-10

## 2023-07-03 RX ORDER — ONDANSETRON 2 MG/ML
8 INJECTION INTRAMUSCULAR; INTRAVENOUS
OUTPATIENT
Start: 2023-07-10

## 2023-07-03 RX ORDER — SODIUM CHLORIDE 9 MG/ML
5-250 INJECTION, SOLUTION INTRAVENOUS PRN
Status: CANCELLED | OUTPATIENT
Start: 2023-07-10

## 2023-07-03 RX ORDER — MEPERIDINE HYDROCHLORIDE 25 MG/ML
25 INJECTION INTRAMUSCULAR; INTRAVENOUS; SUBCUTANEOUS ONCE
Start: 2023-07-10 | End: 2023-07-10

## 2023-07-03 RX ORDER — DIPHENHYDRAMINE HYDROCHLORIDE 50 MG/ML
50 INJECTION INTRAMUSCULAR; INTRAVENOUS
OUTPATIENT
Start: 2023-07-10

## 2023-07-03 RX ORDER — SODIUM CHLORIDE 9 MG/ML
5-250 INJECTION, SOLUTION INTRAVENOUS PRN
OUTPATIENT
Start: 2023-07-10

## 2023-07-03 RX ORDER — SODIUM CHLORIDE 9 MG/ML
INJECTION, SOLUTION INTRAVENOUS CONTINUOUS
OUTPATIENT
Start: 2023-07-10

## 2023-07-03 RX ORDER — ALBUTEROL SULFATE 90 UG/1
4 AEROSOL, METERED RESPIRATORY (INHALATION) PRN
OUTPATIENT
Start: 2023-07-10

## 2023-07-03 RX ORDER — ACETAMINOPHEN 325 MG/1
650 TABLET ORAL
OUTPATIENT
Start: 2023-07-10

## 2023-07-03 RX ORDER — SODIUM CHLORIDE 0.9 % (FLUSH) 0.9 %
5-40 SYRINGE (ML) INJECTION PRN
Status: CANCELLED | OUTPATIENT
Start: 2023-07-10

## 2023-07-03 RX ORDER — HEPARIN SODIUM 100 [USP'U]/ML
500 INJECTION, SOLUTION INTRAVENOUS PRN
OUTPATIENT
Start: 2023-07-10

## 2023-07-03 RX ORDER — SODIUM CHLORIDE 0.9 % (FLUSH) 0.9 %
5-40 SYRINGE (ML) INJECTION PRN
Status: DISCONTINUED | OUTPATIENT
Start: 2023-07-03 | End: 2023-07-04 | Stop reason: HOSPADM

## 2023-07-03 RX ADMIN — SODIUM CHLORIDE 30 ML: 9 INJECTION, SOLUTION INTRAVENOUS at 15:01

## 2023-07-03 RX ADMIN — SODIUM CHLORIDE 125 MG: 9 INJECTION, SOLUTION INTRAVENOUS at 14:03

## 2023-07-03 RX ADMIN — SODIUM CHLORIDE, PRESERVATIVE FREE 10 ML: 5 INJECTION INTRAVENOUS at 14:00

## 2023-07-03 NOTE — PROGRESS NOTES
Patient tolerated 5th ferrlecit infusion well. Has not had any issues with previous transfusions. Patient alert and oriented x3. No distress noted. Vital signs stable. Patient denies any new or worsening pain. Offered patient education and/or discharge material. Patient declined. Patient denies any needs. All questions answered. D/C in stable condition. Told patient to monitor BP at home and if symptomatic or BP remains elevated to contact physician or got to ER. She has an appointment with a cardiologist this month. Patient states understanding.

## 2023-07-10 ENCOUNTER — HOSPITAL ENCOUNTER (OUTPATIENT)
Dept: INFUSION THERAPY | Age: 48
Setting detail: INFUSION SERIES
Discharge: HOME | End: 2023-07-10
Payer: COMMERCIAL

## 2023-07-10 VITALS
HEART RATE: 61 BPM | RESPIRATION RATE: 16 BRPM | TEMPERATURE: 98.4 F | DIASTOLIC BLOOD PRESSURE: 83 MMHG | OXYGEN SATURATION: 96 % | SYSTOLIC BLOOD PRESSURE: 148 MMHG

## 2023-07-10 DIAGNOSIS — D50.9 IRON DEFICIENCY ANEMIA, UNSPECIFIED IRON DEFICIENCY ANEMIA TYPE: Primary | ICD-10-CM

## 2023-07-10 PROCEDURE — 96365 THER/PROPH/DIAG IV INF INIT: CPT

## 2023-07-10 PROCEDURE — 2580000003 HC RX 258: Performed by: INTERNAL MEDICINE

## 2023-07-10 PROCEDURE — 6360000002 HC RX W HCPCS: Performed by: INTERNAL MEDICINE

## 2023-07-10 RX ORDER — ONDANSETRON 2 MG/ML
8 INJECTION INTRAMUSCULAR; INTRAVENOUS
OUTPATIENT
Start: 2023-07-10

## 2023-07-10 RX ORDER — SODIUM CHLORIDE 0.9 % (FLUSH) 0.9 %
5-40 SYRINGE (ML) INJECTION PRN
Status: DISCONTINUED | OUTPATIENT
Start: 2023-07-10 | End: 2023-07-11 | Stop reason: HOSPADM

## 2023-07-10 RX ORDER — DIPHENHYDRAMINE HYDROCHLORIDE 50 MG/ML
50 INJECTION INTRAMUSCULAR; INTRAVENOUS
OUTPATIENT
Start: 2023-07-10

## 2023-07-10 RX ORDER — SODIUM CHLORIDE 9 MG/ML
5-250 INJECTION, SOLUTION INTRAVENOUS PRN
Status: CANCELLED | OUTPATIENT
Start: 2023-07-10

## 2023-07-10 RX ORDER — ACETAMINOPHEN 325 MG/1
650 TABLET ORAL
OUTPATIENT
Start: 2023-07-10

## 2023-07-10 RX ORDER — SODIUM CHLORIDE 0.9 % (FLUSH) 0.9 %
5-40 SYRINGE (ML) INJECTION PRN
OUTPATIENT
Start: 2023-07-10

## 2023-07-10 RX ORDER — ALBUTEROL SULFATE 90 UG/1
4 AEROSOL, METERED RESPIRATORY (INHALATION) PRN
OUTPATIENT
Start: 2023-07-10

## 2023-07-10 RX ORDER — SODIUM CHLORIDE 9 MG/ML
INJECTION, SOLUTION INTRAVENOUS CONTINUOUS
OUTPATIENT
Start: 2023-07-10

## 2023-07-10 RX ORDER — MEPERIDINE HYDROCHLORIDE 25 MG/ML
25 INJECTION INTRAMUSCULAR; INTRAVENOUS; SUBCUTANEOUS ONCE
Start: 2023-07-10 | End: 2023-07-10

## 2023-07-10 RX ORDER — SODIUM CHLORIDE 9 MG/ML
5-250 INJECTION, SOLUTION INTRAVENOUS PRN
Status: DISCONTINUED | OUTPATIENT
Start: 2023-07-10 | End: 2023-07-11 | Stop reason: HOSPADM

## 2023-07-10 RX ORDER — SODIUM CHLORIDE 9 MG/ML
5-250 INJECTION, SOLUTION INTRAVENOUS PRN
OUTPATIENT
Start: 2023-07-10

## 2023-07-10 RX ORDER — EPINEPHRINE 1 MG/ML
0.3 INJECTION, SOLUTION, CONCENTRATE INTRAVENOUS PRN
OUTPATIENT
Start: 2023-07-10

## 2023-07-10 RX ORDER — HEPARIN SODIUM 100 [USP'U]/ML
500 INJECTION, SOLUTION INTRAVENOUS PRN
OUTPATIENT
Start: 2023-07-10

## 2023-07-10 RX ADMIN — SODIUM CHLORIDE 110 ML/HR: 9 INJECTION, SOLUTION INTRAVENOUS at 15:14

## 2023-07-10 RX ADMIN — SODIUM CHLORIDE, PRESERVATIVE FREE 10 ML: 5 INJECTION INTRAVENOUS at 13:45

## 2023-07-10 RX ADMIN — SODIUM CHLORIDE 125 MG: 9 INJECTION, SOLUTION INTRAVENOUS at 14:15

## 2023-07-11 ENCOUNTER — TELEPHONE (OUTPATIENT)
Dept: ADMINISTRATIVE | Age: 48
End: 2023-07-11

## 2023-07-11 NOTE — TELEPHONE ENCOUNTER
Patient calling to touch base on her lab results. Stated she received call over weekend and was going to get another call. Please advise.

## 2023-08-10 ENCOUNTER — OFFICE VISIT (OUTPATIENT)
Dept: OBGYN | Age: 48
End: 2023-08-10

## 2023-08-10 VITALS
DIASTOLIC BLOOD PRESSURE: 79 MMHG | WEIGHT: 239 LBS | BODY MASS INDEX: 37.51 KG/M2 | HEART RATE: 72 BPM | SYSTOLIC BLOOD PRESSURE: 160 MMHG | HEIGHT: 67 IN

## 2023-08-10 DIAGNOSIS — N83.299 COMPLEX OVARIAN CYST: Primary | ICD-10-CM

## 2023-08-10 DIAGNOSIS — D21.9 FIBROIDS: ICD-10-CM

## 2023-08-10 DIAGNOSIS — Z12.31 ENCOUNTER FOR SCREENING MAMMOGRAM FOR BREAST CANCER: ICD-10-CM

## 2023-08-10 NOTE — PROGRESS NOTES
Patient alert and pleasant with no new concerns. Here today for US follow up   Discharge instructions have been discussed with the patient. Patient advised to call our office with any questions or concerns. Voiced understanding.

## 2023-08-10 NOTE — PROGRESS NOTES
Wiley Rafiq     Patient presents for follow up. Patient was seen by Sonu Lima in June as an ED follow up for anemia. Fibroids and a complex cyst were noted on CT scan done in the ED. CT scan 4/30/23 showed 6.2cm complex cyst on left ovary with surrounding free fluid, 12cm uterus. Pelvic ultrasound done 6/22/23 showed 11cm uterus with 5mm lining, three fibroids noted. Complex cyst was not presents. Offered patient reassurance as the cyst resolved. Patient has been having prolonged, heavy cycles since October. Patient has a complex medical history including blood clot of her carotid artery and TIAs. Discussed risks of surgery and hormones. Recommend EMB prior to deciding. Patient is due for a pap smear but is bleeding today. Mammogram ordered. Past Medical History:   Diagnosis Date    Anemia     Arcuate uterus     Per Patient. Also known Hx Fibroids. Arthritis     osteoarthritis - take ibuprophen    Depression     Bouts in past, meds in past    Epilepsy ([address])     As a child, was on depakote. last Seizure was maybe 10-12 yo    Factor VIII deficiency ([address])     H. pylori infection 2018    per pt. Hx of blood clots 2014    carotid artery blood clot was on coumadin approx 1 yr    Hx of cardiovascular stress test 10/07/2015    lexiscan    Hyperlipidemia     mildly elevated    Hypertension     Infertility, female     \"They told me i could never get pregnant because of the fibroids and my uterus\".     TIA (transient ischemic attack)     x2  last one 2014  no deficits          Past Surgical History:   Procedure Laterality Date    APPENDECTOMY      CHOLECYSTECTOMY      KNEE ARTHROSCOPY Left 09/15/2017    left knee arthroscopy with chondroplasty, synovectomy and lateral meniscectomy    TONSILLECTOMY          Family History   Problem Relation Age of Onset    Diabetes Mother     Stroke Mother     Heart Disease Mother     Uterine Cancer Mother 36    Cancer Father     Heart Disease Father

## 2023-08-31 ENCOUNTER — OFFICE VISIT (OUTPATIENT)
Dept: PULMONOLOGY | Age: 48
End: 2023-08-31
Payer: COMMERCIAL

## 2023-08-31 VITALS
HEIGHT: 67 IN | OXYGEN SATURATION: 97 % | WEIGHT: 242 LBS | HEART RATE: 65 BPM | TEMPERATURE: 97.6 F | BODY MASS INDEX: 37.98 KG/M2 | DIASTOLIC BLOOD PRESSURE: 81 MMHG | SYSTOLIC BLOOD PRESSURE: 149 MMHG

## 2023-08-31 DIAGNOSIS — G47.33 OSA (OBSTRUCTIVE SLEEP APNEA): Primary | ICD-10-CM

## 2023-08-31 DIAGNOSIS — J44.9 CHRONIC OBSTRUCTIVE PULMONARY DISEASE, UNSPECIFIED COPD TYPE (HCC): ICD-10-CM

## 2023-08-31 DIAGNOSIS — F17.200 NICOTINE DEPENDENCE WITH CURRENT USE: ICD-10-CM

## 2023-08-31 PROCEDURE — G8427 DOCREV CUR MEDS BY ELIG CLIN: HCPCS | Performed by: INTERNAL MEDICINE

## 2023-08-31 PROCEDURE — 99244 OFF/OP CNSLTJ NEW/EST MOD 40: CPT | Performed by: INTERNAL MEDICINE

## 2023-08-31 PROCEDURE — 3023F SPIROM DOC REV: CPT | Performed by: INTERNAL MEDICINE

## 2023-08-31 PROCEDURE — 3079F DIAST BP 80-89 MM HG: CPT | Performed by: INTERNAL MEDICINE

## 2023-08-31 PROCEDURE — 3077F SYST BP >= 140 MM HG: CPT | Performed by: INTERNAL MEDICINE

## 2023-08-31 PROCEDURE — G8417 CALC BMI ABV UP PARAM F/U: HCPCS | Performed by: INTERNAL MEDICINE

## 2023-08-31 ASSESSMENT — ENCOUNTER SYMPTOMS
COUGH: 1
GASTROINTESTINAL NEGATIVE: 1
ALLERGIC/IMMUNOLOGIC NEGATIVE: 1
EYES NEGATIVE: 1
WHEEZING: 1

## 2023-08-31 NOTE — PROGRESS NOTES
Patient to follow up with physician in 3 months. PFT/6 min walk will be scheduled @ [facility].  Sleep Study will be scheduled @ [facility].
Exam  Constitutional:       Appearance: Normal appearance. She is obese. HENT:      Head: Normocephalic and atraumatic. Nose: Nose normal.      Mouth/Throat:      Mouth: Mucous membranes are moist.      Pharynx: Oropharynx is clear. Eyes:      Extraocular Movements: Extraocular movements intact. Conjunctiva/sclera: Conjunctivae normal.      Pupils: Pupils are equal, round, and reactive to light. Comments: Mallampati score is 3   Cardiovascular:      Rate and Rhythm: Normal rate and regular rhythm. Heart sounds: Murmur heard. Pulmonary:      Effort: Pulmonary effort is normal.      Breath sounds: Normal breath sounds. Abdominal:      General: Bowel sounds are normal.      Palpations: Abdomen is soft. Musculoskeletal:         General: Normal range of motion. Skin:     General: Skin is warm. Neurological:      General: No focal deficit present. Mental Status: She is alert and oriented to person, place, and time. Assessment/Plan:   Diagnosis Orders   1. LUCA (obstructive sleep apnea)  Baseline Diagnostic Sleep Study      2. Chronic obstructive pulmonary disease, unspecified COPD type ([address])  Full PFT Study With Bronchodilator    6 Minute Walk Test      3. Nicotine dependence with current use          She has been counseled on smoking cessation. PSG ordered, she most likely has LUCA and COPD as well. Will obtain PFT and 6 minute walk test. Her chest x-ray done in May 2023 was normal.    Follow up in 3 months.      Electronically by Maya Collins MD  on 8/31/23 at 1:07 PM EDT

## 2023-09-06 ENCOUNTER — TELEPHONE (OUTPATIENT)
Dept: SLEEP CENTER | Age: 48
End: 2023-09-06

## 2023-09-20 ENCOUNTER — PROCEDURE VISIT (OUTPATIENT)
Dept: OBGYN | Age: 48
End: 2023-09-20

## 2023-09-20 VITALS
RESPIRATION RATE: 16 BRPM | SYSTOLIC BLOOD PRESSURE: 178 MMHG | BODY MASS INDEX: 38.61 KG/M2 | WEIGHT: 246 LBS | HEART RATE: 80 BPM | HEIGHT: 67 IN | DIASTOLIC BLOOD PRESSURE: 82 MMHG

## 2023-09-20 DIAGNOSIS — N93.9 ABNORMAL UTERINE BLEEDING: ICD-10-CM

## 2023-09-20 DIAGNOSIS — Z12.4 SCREENING FOR CERVICAL CANCER: ICD-10-CM

## 2023-09-20 DIAGNOSIS — D21.9 FIBROIDS: Primary | ICD-10-CM

## 2023-09-21 ENCOUNTER — PREP FOR PROCEDURE (OUTPATIENT)
Dept: OBGYN | Age: 48
End: 2023-09-21

## 2023-09-21 DIAGNOSIS — N93.9 ABNORMAL UTERINE BLEEDING: ICD-10-CM

## 2023-09-22 ENCOUNTER — HOSPITAL ENCOUNTER (OUTPATIENT)
Dept: PULMONOLOGY | Age: 48
Discharge: HOME | End: 2023-09-22
Attending: INTERNAL MEDICINE
Payer: COMMERCIAL

## 2023-09-22 VITALS — WEIGHT: 246 LBS | BODY MASS INDEX: 38.61 KG/M2 | HEIGHT: 67 IN

## 2023-09-22 DIAGNOSIS — J44.9 CHRONIC OBSTRUCTIVE PULMONARY DISEASE, UNSPECIFIED COPD TYPE (HCC): ICD-10-CM

## 2023-09-22 PROCEDURE — 94726 PLETHYSMOGRAPHY LUNG VOLUMES: CPT

## 2023-09-22 PROCEDURE — 94060 EVALUATION OF WHEEZING: CPT

## 2023-09-22 PROCEDURE — 94618 PULMONARY STRESS TESTING: CPT

## 2023-09-22 PROCEDURE — 94729 DIFFUSING CAPACITY: CPT

## 2023-09-22 ASSESSMENT — 6 MINUTE WALK TEST (6MWT)
TOTAL DISTANCE WALKED (M): 462
ANY PROBLEMS WHILE EXERCISING?: NO
HEART RATE: 67
HEART RATE: 75
BORG DYSPNEA SCALE SCORE: 0
DID PATIENT STOP OR PAUSE BEFORE 6 MINUTES?: NO
BORG DYSPNEA SCALE SCORE: 0
BORG FATIGUE SCALE SCORE: 1
% PREDICTED: 91.49
O2 SATURATION: 96
O2 SATURATION: 97
HEART RATE: 67
OXYGEN DEVICE: ROOM AIR
BORG FATIGUE SCALE SCORE: 0
O2 SATURATION: 96

## 2023-09-22 NOTE — PROGRESS NOTES
09/22/23 0828   Data Measured Before Walk   Height 5' 7\" (1.702 m)   Weight - Scale 246 lb (111.6 kg)   HR 67   O2 Saturation 96   O2 Device Room air   Radha Dyspnea Scale 0   Radha Fatigue Scale 0   Data Measured During the Walk   Any Problems While Exercising no   Data Measured Immediately After Walk   Did Patient Stop or Pause Before 6 Minutes No   Predicted Distance (m) 505 Meters   Total Distance Walked (m) 462 Meters   % Predicted 91.49   Heart Rate 75   O2 Saturation 97   Radha Dyspnea Scale 0   Radha Fatigue Scale 1   Data Measured 5 Minutes After Walk   Heart Rate 67   O2 Saturation 96   Comments Pt walked at a fast steady pace.

## 2023-09-22 NOTE — PROCEDURES
[address] [facility]                    [address] [place], [address] [place] [address]                               PULMONARY FUNCTION    PATIENT NAME: Cande Griffiths             :        1975  MED REC NO:   [id number]                            ROOM:  ACCOUNT NO:   [de-identified]                           ADMIT DATE: 2023  PROVIDER:     Logan Arce MD    DATE OF PROCEDURE:  2023    SIX-MINUTE WALK TEST    The patient's oxygen saturation before walking was 96%, heart rate 67  per minute. The patient walked a distance of 462 meters, which was 91%  of the predicted distance. The lowest oxygen saturation during walking  was 97%. The heart rate 5 minutes after walking was 67. Radha dyspnea  scale was 0 and Radha fatigue scale was 1.         Logan Arce MD    D: 2023 10:14:11       T: 2023 10:31:30     [email or web address]  Job#: [id number]     Doc#: [id number]    CC:

## 2023-09-23 NOTE — PROCEDURES
[address] [facility]                    [address] [facility], [address] [place] [address]                               PULMONARY FUNCTION    PATIENT NAME: Tristan Okeefe             :        1975  MED REC NO:   [id number]                            ROOM:  ACCOUNT NO:   [de-identified]                           ADMIT DATE: 2023  PROVIDER:     Mirela Clayton MD    DATE OF PROCEDURE:  2023    FINDINGS:  Spirometry shows normal FVC, FEV1 and FEV1/FVC. The maximum  voluntary ventilation is 80% on the predicted value. According to Z-score criteria, FVC, FEV1 and FEV1/FVC are under the area  of curve, within standard deviation of -1.64. After bronchodilator  therapy, there is no improvement still in the spirometry. The lung volume study shows normal TLC and increased RV. The diffusion capacity is normal.    IMPRESSION:  The above study is normal.  There is no significant change  after bronchodilator therapy.         Mirela Clayton MD    D: 2023 10:29:35       T: 2023 12:21:49     [email or web address]  Job#: [id number]     Doc#: [id number]    CC:

## 2023-09-25 ENCOUNTER — TELEPHONE (OUTPATIENT)
Dept: OBGYN | Age: 48
End: 2023-09-25

## 2023-09-25 NOTE — TELEPHONE ENCOUNTER
Surgery scheduled 11/21/23 @ [facility]  Pre-op appointment 11/14/23 @0973  Patient made aware of surgery date and time. Patient voiced understanding   [facility]    Patient advised she needs medical clearance no more then 30 days prior to [facility].   Patient voiced understanding

## 2023-09-27 LAB — GYNECOLOGY CYTOLOGY REPORT: NORMAL

## 2023-09-30 NOTE — H&P
History of Present Illness:   Pregnant/Lactating:  ·  Are You Pregnant no (1)   ·  Are You Currently Breastfeeding no (1)     History Present Illness:  Reason for surgery: chest pain concerning for angina   HPI:    Tania Lainez is a very pleasant 47 yo female from home with h/o COPD, morbid obesity, heavy smoker, hypertension with severe  LVH, dyslipidemia presenting  today for elective coronary angiography for assessment of obstructive CAD given persistent chest pain occurring at rest and also aggravated with exertion with associated SOB.     Allergies:        Allergies:  ·  fentanyl : Rash  ·  amlodipine : Unknown  ·  penicillin : Unknown  ·  erythromycin : Unknown  ·  azithromycin : Unknown  ·  codeine : Unknown  ·  sulfa  drugs: Unknown    Home Medication Review:   Home Medications Reviewed: yes     Impression/Procedure:   ·  Impression and Planned Procedure: Tania Lainez is a very pleasant 47 yo female from home with h/o COPD, morbid obesity, heavy smoker, hypertension with severe  LVH, dyslipidemia presenting today  for elective coronary angiography for assessment of obstructive CAD given persistent chest pain occurring at rest and also aggravated with exertion with associated SOB.       ERAS (Enhanced Recovery After Surgery):  ·  ERAS Patient: no       Vital Signs:  Heart Rate: 56 beats per minute   Respiratory Rate: 16 breath per minute   Blood Pressure Systolic: 194 mm/Hg   Blood Pressure Diastolic: 78 mm/Hg     Physical Exam by System:    Constitutional: Well developed, awake/alert/oriented  x3, no distress, alert and cooperative   ENMT: mucous membranes moist, no apparent injury,  no lesions seen   Head/Neck: Neck supple, no apparent injury, thyroid  without mass or tenderness, No JVD, trachea midline, no bruits   Respiratory/Thorax: Patent airways, CTAB, normal  breath sounds with good chest expansion, thorax symmetric   Cardiovascular: Regular, rate and rhythm, no murmurs,  2+ equal  "pulses of the extremities, normal S 1and S 2   Musculoskeletal: ROM intact, no joint swelling, normal  strength   Extremities: normal extremities, no cyanosis edema,  contusions or wounds, no clubbing   Neurological: alert and oriented x3, intact senses,  motor, response and reflexes, normal strength   Psychological: Appropriate mood and behavior   Skin: Warm and dry, no lesions, no rashes     Consent:   COVID-19 Consent:  ·  COVID-19 Risk Consent Surgeon has reviewed key risks related to the risk of loraine COVID-19 and if they contract COVID-19 what the risks are.     Attestation:   Note Completion:        Electronic Signatures:  Carolynn Miles (APRN-CNP)  (Signed 04-Aug-2023 09:43)   Authored: History of Present Illness, Allergies, Home  Medication Review, Impression/Procedure, ERAS, Physical Exam, Consent, Note Completion  Jorge Moraes)  (Signed 12-Aug-2023 11:51)   Authored: Note Completion   Co-Signer: History of Present Illness, Allergies, Home Medication Review, Impression/Procedure, ERAS, Physical Exam, Consent, Note Completion      Last Updated: 12-Aug-2023 11:51 by Jorge Moraes)    References:  1.  Data Referenced From \"Patient Profile - Preop v3\" 04-Aug-2023 09:23   "

## 2023-10-02 LAB
HPV SAMPLE: ABNORMAL
HPV SOURCE: ABNORMAL
HPV, GENOTYPE 16: NOT DETECTED
HPV, GENOTYPE 18: NOT DETECTED
HPV, HIGH RISK OTHER: DETECTED
HPV, INTERPRETATION: ABNORMAL

## 2023-10-16 LAB — GYNECOLOGY CYTOLOGY REPORT: NORMAL

## 2023-11-09 ENCOUNTER — PROCEDURE VISIT (OUTPATIENT)
Dept: OBGYN | Age: 48
End: 2023-11-09

## 2023-11-09 VITALS
WEIGHT: 243 LBS | SYSTOLIC BLOOD PRESSURE: 199 MMHG | DIASTOLIC BLOOD PRESSURE: 69 MMHG | HEIGHT: 67 IN | BODY MASS INDEX: 38.14 KG/M2 | HEART RATE: 66 BPM

## 2023-11-09 DIAGNOSIS — D21.9 FIBROIDS: ICD-10-CM

## 2023-11-09 DIAGNOSIS — R87.613 HSIL (HIGH GRADE SQUAMOUS INTRAEPITHELIAL LESION) ON PAP SMEAR OF CERVIX: Primary | ICD-10-CM

## 2023-11-09 DIAGNOSIS — N93.9 ABNORMAL UTERINE BLEEDING: ICD-10-CM

## 2023-11-09 SDOH — ECONOMIC STABILITY: INCOME INSECURITY: HOW HARD IS IT FOR YOU TO PAY FOR THE VERY BASICS LIKE FOOD, HOUSING, MEDICAL CARE, AND HEATING?: NOT HARD AT ALL

## 2023-11-09 SDOH — ECONOMIC STABILITY: FOOD INSECURITY: WITHIN THE PAST 12 MONTHS, THE FOOD YOU BOUGHT JUST DIDN'T LAST AND YOU DIDN'T HAVE MONEY TO GET MORE.: NEVER TRUE

## 2023-11-09 SDOH — ECONOMIC STABILITY: HOUSING INSECURITY
IN THE LAST 12 MONTHS, WAS THERE A TIME WHEN YOU DID NOT HAVE A STEADY PLACE TO SLEEP OR SLEPT IN A SHELTER (INCLUDING NOW)?: NO

## 2023-11-09 SDOH — ECONOMIC STABILITY: FOOD INSECURITY: WITHIN THE PAST 12 MONTHS, YOU WORRIED THAT YOUR FOOD WOULD RUN OUT BEFORE YOU GOT MONEY TO BUY MORE.: NEVER TRUE

## 2023-11-09 NOTE — PROGRESS NOTES
Colposcopy Procedure Note    Indications: Pap smear showed: HSIL     Patient has had prolonged heavy cycles for some time. She desires hysterectomy. An EMB was attempted but unable to get a specimen due to cervical stenosis. She is scheduled for a hysteroscopy, D&C on 11/21/23. Patient with significantly elevated BP today. States she was recently diagnosed with a cardiac condition and will be going to [facility]. Patient states she has congenital hypertrophic cardiomyopathy. She follows with a local doctor but is going to [facility] in January to discuss internal defibrillator. Discussed patient's complex medical history including blood clot of her carotid artery, TIAs, congenital cardiomyopathy. Patient was supposed to have medical clearance today for surgery in 2 weeks. Discussed with current state of cardiomyopathy will hold on surgery at this time. Advised patient to go to [facility] about her defibrillator. Will send referral to Dr. Lura Mcardle to discuss desired hysterectomy and timing due to medical conditions. Patient agrees. Procedure Details   The risks and benefits of the procedure and informed consent obtained. Speculum placed in vagina and excellent visualization of cervix achieved, cervix swabbed copiously with acetic acid solution. Reviewed nothing in the vagina (intercourse, tampons, baths, etc.) for 48 hours. Findings:  Cervix:   Transformation zone visualized, AWE noted at 10-12 and 6 oclock. Biopsies taken at 10 and 6 oclock. ECC collected. Silver nitrate and monsels applied for hemostasis. Specimens: Biopsies to pathology     Complications: none. Plan: Will call with results in two weeks. Will cancel [facility] at this time. Referral placed to Dr. Lura Mcardle to discuss possible surgery in the future. Patient will need to see cardiology in [facility].      Lucy Burrell MD

## 2023-11-09 NOTE — PROGRESS NOTES
Here today for colposcopy. Instructed on the procedure of colposcopy, voiced understanding and permit signed for colposcopy with possible biopsies. Urine pregnancy done with negative results. Prepared for procedure. Time out done by  Prior to starting the procedure. Tolerated procedure. No bleeding noted after procedure, norman pad applied. To return in one week for results. Discharge instructions reviewed verbally and written AVS given to patient. Voiced understanding and agreement.    Biopsy obtained at 10:00 and 6:00 position   ECC obtained, labled and sent to lab

## 2023-11-13 ENCOUNTER — TELEPHONE (OUTPATIENT)
Dept: OBGYN | Age: 48
End: 2023-11-13

## 2023-11-13 NOTE — TELEPHONE ENCOUNTER
Surgery for 11/21/23 is cancelled.  See doctors notes  Surgery center notified and surgery cancelled

## 2023-11-14 LAB — SURGICAL PATHOLOGY REPORT: NORMAL

## 2023-11-17 ENCOUNTER — HOSPITAL ENCOUNTER (OUTPATIENT)
Age: 48
Setting detail: OBSERVATION
Discharge: HOME | End: 2023-11-17
Attending: EMERGENCY MEDICINE | Admitting: INTERNAL MEDICINE
Payer: COMMERCIAL

## 2023-11-17 ENCOUNTER — APPOINTMENT (OUTPATIENT)
Dept: GENERAL RADIOLOGY | Age: 48
End: 2023-11-17
Payer: COMMERCIAL

## 2023-11-17 ENCOUNTER — APPOINTMENT (OUTPATIENT)
Dept: CT IMAGING | Age: 48
End: 2023-11-17
Payer: COMMERCIAL

## 2023-11-17 VITALS
BODY MASS INDEX: 37.67 KG/M2 | SYSTOLIC BLOOD PRESSURE: 156 MMHG | HEART RATE: 61 BPM | RESPIRATION RATE: 16 BRPM | OXYGEN SATURATION: 92 % | WEIGHT: 240 LBS | DIASTOLIC BLOOD PRESSURE: 71 MMHG | HEIGHT: 67 IN | TEMPERATURE: 97.1 F

## 2023-11-17 DIAGNOSIS — R07.9 CHEST PAIN, UNSPECIFIED TYPE: Primary | ICD-10-CM

## 2023-11-17 PROBLEM — I16.1 HYPERTENSIVE EMERGENCY: Status: ACTIVE | Noted: 2023-11-17

## 2023-11-17 LAB
ALBUMIN SERPL-MCNC: 4.1 G/DL (ref 3.5–5.2)
ALP SERPL-CCNC: 148 U/L (ref 35–104)
ALT SERPL-CCNC: 13 U/L (ref 0–32)
AMPHET UR QL SCN: NEGATIVE
ANION GAP SERPL CALCULATED.3IONS-SCNC: 15 MMOL/L (ref 7–16)
AST SERPL-CCNC: 15 U/L (ref 0–31)
BARBITURATES UR QL SCN: NEGATIVE
BASOPHILS # BLD: 0 K/UL (ref 0–0.2)
BASOPHILS NFR BLD: 0 % (ref 0–2)
BENZODIAZ UR QL: NEGATIVE
BILIRUB SERPL-MCNC: 0.5 MG/DL (ref 0–1.2)
BNP SERPL-MCNC: 1567 PG/ML (ref 0–125)
BUN SERPL-MCNC: 6 MG/DL (ref 6–20)
BUPRENORPHINE UR QL: NEGATIVE
CALCIUM SERPL-MCNC: 9.3 MG/DL (ref 8.6–10.2)
CANNABINOIDS UR QL SCN: NEGATIVE
CHLORIDE SERPL-SCNC: 105 MMOL/L (ref 98–107)
CO2 SERPL-SCNC: 21 MMOL/L (ref 22–29)
COCAINE UR QL SCN: NEGATIVE
CREAT SERPL-MCNC: 0.8 MG/DL (ref 0.5–1)
EKG ATRIAL RATE: 79 BPM
EKG P AXIS: 29 DEGREES
EKG P-R INTERVAL: 166 MS
EKG Q-T INTERVAL: 366 MS
EKG QRS DURATION: 104 MS
EKG QTC CALCULATION (BAZETT): 419 MS
EKG R AXIS: -5 DEGREES
EKG T AXIS: 138 DEGREES
EKG VENTRICULAR RATE: 79 BPM
EOSINOPHIL # BLD: 0.2 K/UL (ref 0.05–0.5)
EOSINOPHILS RELATIVE PERCENT: 4 % (ref 0–6)
ERYTHROCYTE [DISTWIDTH] IN BLOOD BY AUTOMATED COUNT: 17.7 % (ref 11.5–15)
FENTANYL UR QL: NEGATIVE
GFR SERPL CREATININE-BSD FRML MDRD: >60 ML/MIN/1.73M2
GLUCOSE SERPL-MCNC: 117 MG/DL (ref 74–99)
HCG UR QL: NEGATIVE
HCG, URINE, POC: NEGATIVE
HCT VFR BLD AUTO: 39.1 % (ref 34–48)
HGB BLD-MCNC: 12.1 G/DL (ref 11.5–15.5)
LYMPHOCYTES NFR BLD: 0.45 K/UL (ref 1.5–4)
LYMPHOCYTES RELATIVE PERCENT: 8 % (ref 20–42)
Lab: NORMAL
MCH RBC QN AUTO: 24.2 PG (ref 26–35)
MCHC RBC AUTO-ENTMCNC: 30.9 G/DL (ref 32–34.5)
MCV RBC AUTO: 78 FL (ref 80–99.9)
METHADONE UR QL: NEGATIVE
MONOCYTES NFR BLD: 0.3 K/UL (ref 0.1–0.95)
MONOCYTES NFR BLD: 5 % (ref 2–12)
NEGATIVE QC PASS/FAIL: NORMAL
NEUTROPHILS NFR BLD: 84 % (ref 43–80)
NEUTS SEG NFR BLD: 4.84 K/UL (ref 1.8–7.3)
OPIATES UR QL SCN: NEGATIVE
OXYCODONE UR QL SCN: NEGATIVE
PCP UR QL SCN: NEGATIVE
PLATELET # BLD AUTO: 318 K/UL (ref 130–450)
PMV BLD AUTO: 10.3 FL (ref 7–12)
POSITIVE QC PASS/FAIL: NORMAL
POTASSIUM SERPL-SCNC: 3.6 MMOL/L (ref 3.5–5)
PROT SERPL-MCNC: 7.6 G/DL (ref 6.4–8.3)
RBC # BLD AUTO: 5.01 M/UL (ref 3.5–5.5)
RBC # BLD: ABNORMAL 10*6/UL
SODIUM SERPL-SCNC: 141 MMOL/L (ref 132–146)
TEST INFORMATION: NORMAL
TROPONIN I SERPL HS-MCNC: 11 NG/L (ref 0–9)
TROPONIN I SERPL HS-MCNC: 13 NG/L (ref 0–9)
WBC OTHER # BLD: 5.8 K/UL (ref 4.5–11.5)

## 2023-11-17 PROCEDURE — 85025 COMPLETE CBC W/AUTO DIFF WBC: CPT

## 2023-11-17 PROCEDURE — 84703 CHORIONIC GONADOTROPIN ASSAY: CPT

## 2023-11-17 PROCEDURE — 6370000000 HC RX 637 (ALT 250 FOR IP): Performed by: STUDENT IN AN ORGANIZED HEALTH CARE EDUCATION/TRAINING PROGRAM

## 2023-11-17 PROCEDURE — 93010 ELECTROCARDIOGRAM REPORT: CPT | Performed by: INTERNAL MEDICINE

## 2023-11-17 PROCEDURE — 99285 EMERGENCY DEPT VISIT HI MDM: CPT

## 2023-11-17 PROCEDURE — 93005 ELECTROCARDIOGRAM TRACING: CPT | Performed by: STUDENT IN AN ORGANIZED HEALTH CARE EDUCATION/TRAINING PROGRAM

## 2023-11-17 PROCEDURE — G0378 HOSPITAL OBSERVATION PER HR: HCPCS

## 2023-11-17 PROCEDURE — 2580000003 HC RX 258: Performed by: INTERNAL MEDICINE

## 2023-11-17 PROCEDURE — 71275 CT ANGIOGRAPHY CHEST: CPT

## 2023-11-17 PROCEDURE — 96374 THER/PROPH/DIAG INJ IV PUSH: CPT

## 2023-11-17 PROCEDURE — 80307 DRUG TEST PRSMV CHEM ANLYZR: CPT

## 2023-11-17 PROCEDURE — 83880 ASSAY OF NATRIURETIC PEPTIDE: CPT

## 2023-11-17 PROCEDURE — 6360000004 HC RX CONTRAST MEDICATION: Performed by: RADIOLOGY

## 2023-11-17 PROCEDURE — 6360000002 HC RX W HCPCS: Performed by: STUDENT IN AN ORGANIZED HEALTH CARE EDUCATION/TRAINING PROGRAM

## 2023-11-17 PROCEDURE — 80053 COMPREHEN METABOLIC PANEL: CPT

## 2023-11-17 PROCEDURE — 6370000000 HC RX 637 (ALT 250 FOR IP): Performed by: INTERNAL MEDICINE

## 2023-11-17 PROCEDURE — 84484 ASSAY OF TROPONIN QUANT: CPT

## 2023-11-17 PROCEDURE — 71045 X-RAY EXAM CHEST 1 VIEW: CPT

## 2023-11-17 RX ORDER — LISINOPRIL 20 MG/1
40 TABLET ORAL DAILY
Status: DISCONTINUED | OUTPATIENT
Start: 2023-11-18 | End: 2023-11-18 | Stop reason: HOSPADM

## 2023-11-17 RX ORDER — LISINOPRIL 40 MG/1
40 TABLET ORAL DAILY
Qty: 30 TABLET | Refills: 3 | Status: SHIPPED | OUTPATIENT
Start: 2023-11-18

## 2023-11-17 RX ORDER — ASPIRIN 81 MG/1
81 TABLET, CHEWABLE ORAL DAILY
Status: DISCONTINUED | OUTPATIENT
Start: 2023-11-18 | End: 2023-11-18 | Stop reason: HOSPADM

## 2023-11-17 RX ORDER — SODIUM CHLORIDE 0.9 % (FLUSH) 0.9 %
5-40 SYRINGE (ML) INJECTION PRN
Status: DISCONTINUED | OUTPATIENT
Start: 2023-11-17 | End: 2023-11-18 | Stop reason: HOSPADM

## 2023-11-17 RX ORDER — ACETAMINOPHEN 650 MG/1
650 SUPPOSITORY RECTAL EVERY 6 HOURS PRN
Status: DISCONTINUED | OUTPATIENT
Start: 2023-11-17 | End: 2023-11-18 | Stop reason: HOSPADM

## 2023-11-17 RX ORDER — SODIUM CHLORIDE 9 MG/ML
INJECTION, SOLUTION INTRAVENOUS PRN
Status: DISCONTINUED | OUTPATIENT
Start: 2023-11-17 | End: 2023-11-18 | Stop reason: HOSPADM

## 2023-11-17 RX ORDER — SODIUM CHLORIDE 0.9 % (FLUSH) 0.9 %
10 SYRINGE (ML) INJECTION
Status: DISCONTINUED | OUTPATIENT
Start: 2023-11-17 | End: 2023-11-18 | Stop reason: HOSPADM

## 2023-11-17 RX ORDER — CALCIUM CARBONATE 500 MG/1
500 TABLET, CHEWABLE ORAL 3 TIMES DAILY PRN
Status: DISCONTINUED | OUTPATIENT
Start: 2023-11-17 | End: 2023-11-18 | Stop reason: HOSPADM

## 2023-11-17 RX ORDER — POLYETHYLENE GLYCOL 3350 17 G/17G
17 POWDER, FOR SOLUTION ORAL DAILY PRN
Status: DISCONTINUED | OUTPATIENT
Start: 2023-11-17 | End: 2023-11-18 | Stop reason: HOSPADM

## 2023-11-17 RX ORDER — SODIUM CHLORIDE 0.9 % (FLUSH) 0.9 %
5-40 SYRINGE (ML) INJECTION EVERY 12 HOURS SCHEDULED
Status: DISCONTINUED | OUTPATIENT
Start: 2023-11-17 | End: 2023-11-18 | Stop reason: HOSPADM

## 2023-11-17 RX ORDER — ONDANSETRON 2 MG/ML
4 INJECTION INTRAMUSCULAR; INTRAVENOUS ONCE
Status: COMPLETED | OUTPATIENT
Start: 2023-11-17 | End: 2023-11-17

## 2023-11-17 RX ORDER — ACETAMINOPHEN 325 MG/1
650 TABLET ORAL EVERY 6 HOURS PRN
Status: DISCONTINUED | OUTPATIENT
Start: 2023-11-17 | End: 2023-11-18 | Stop reason: HOSPADM

## 2023-11-17 RX ORDER — ATORVASTATIN CALCIUM 40 MG/1
40 TABLET, FILM COATED ORAL NIGHTLY
Status: DISCONTINUED | OUTPATIENT
Start: 2023-11-17 | End: 2023-11-18 | Stop reason: HOSPADM

## 2023-11-17 RX ORDER — CARVEDILOL 6.25 MG/1
12.5 TABLET ORAL 2 TIMES DAILY WITH MEALS
Status: DISCONTINUED | OUTPATIENT
Start: 2023-11-17 | End: 2023-11-18 | Stop reason: HOSPADM

## 2023-11-17 RX ORDER — ASPIRIN 81 MG/1
324 TABLET, CHEWABLE ORAL ONCE
Status: COMPLETED | OUTPATIENT
Start: 2023-11-17 | End: 2023-11-17

## 2023-11-17 RX ORDER — LISINOPRIL 20 MG/1
20 TABLET ORAL DAILY
Status: DISCONTINUED | OUTPATIENT
Start: 2023-11-18 | End: 2023-11-17

## 2023-11-17 RX ORDER — LANOLIN ALCOHOL/MO/W.PET/CERES
3 CREAM (GRAM) TOPICAL NIGHTLY PRN
Status: DISCONTINUED | OUTPATIENT
Start: 2023-11-17 | End: 2023-11-18 | Stop reason: HOSPADM

## 2023-11-17 RX ADMIN — ATORVASTATIN CALCIUM 40 MG: 40 TABLET, FILM COATED ORAL at 20:09

## 2023-11-17 RX ADMIN — CARVEDILOL 12.5 MG: 6.25 TABLET, FILM COATED ORAL at 20:09

## 2023-11-17 RX ADMIN — ONDANSETRON 4 MG: 2 INJECTION INTRAMUSCULAR; INTRAVENOUS at 08:58

## 2023-11-17 RX ADMIN — SODIUM CHLORIDE, PRESERVATIVE FREE 10 ML: 5 INJECTION INTRAVENOUS at 20:08

## 2023-11-17 RX ADMIN — ASPIRIN 324 MG: 81 TABLET, CHEWABLE ORAL at 09:00

## 2023-11-17 RX ADMIN — IOPAMIDOL 75 ML: 755 INJECTION, SOLUTION INTRAVENOUS at 14:06

## 2023-11-17 ASSESSMENT — PAIN - FUNCTIONAL ASSESSMENT: PAIN_FUNCTIONAL_ASSESSMENT: 0-10

## 2023-11-17 ASSESSMENT — PAIN SCALES - GENERAL
PAINLEVEL_OUTOF10: 0
PAINLEVEL_OUTOF10: 0

## 2023-11-17 ASSESSMENT — PAIN DESCRIPTION - PAIN TYPE: TYPE: ACUTE PAIN

## 2023-11-17 ASSESSMENT — PAIN DESCRIPTION - LOCATION: LOCATION: CHEST

## 2023-11-17 ASSESSMENT — PAIN DESCRIPTION - DESCRIPTORS: DESCRIPTORS: PRESSURE

## 2023-11-17 ASSESSMENT — PAIN DESCRIPTION - ORIENTATION: ORIENTATION: LEFT

## 2023-11-17 NOTE — H&P
[facility] History and Physical          PCP: Lane Johnson DO    Date of Admission: 11/17/2023    Date of Service: Pt seen/examined on 11/17/2023 and is Placed in Observation. Chief Complaint:  had concerns including Chest Pain (Left sided chest pressure starting a couple days ago but worse this am that radiates down lt arm and into back. Feels like \"elephant is sitting on my chest\" ). History Of Present Illness:    Ms. Romie Alanis, a 57y.o. year old female  who  has a past medical history of Anemia, Arcuate uterus, Arthritis, Chronic obstructive pulmonary disease ([address]), Depression, Epilepsy ([address]), Factor VIII deficiency ([address]), H. pylori infection, Hx of blood clots, Hx of cardiovascular stress test, Hyperlipidemia, Hypertension, Infertility, female, and TIA (transient ischemic attack). Patient presented to the emergency department earlier today due to acute onset chest pain that started earlier today. She states that the chest pain has since then resolved. She denies any associated shortness of breath, lightheadedness, dizziness, nausea, or vomiting. She did have a headache this morning which she gets when her blood pressure is high. She takes lisinopril and Coreg at home and denies ever missing any doses. She denies any fevers or chills. On arrival to the emergency department, patient was noted to be hypertensive at 200/93. Troponin 11 --> 13. No apparent EKG changes noted. She was given Zofran and aspirin 324 mg. Of note, patient had recent ischemic work-up with negative stress test back in May. Cardiology was consulted in the emergency department and recommended observation admission overnight for optimal blood pressure control. Chest pain thought to be likely secondary to hypertensive emergency rather than ischemia. Patient admitted to the medicine service for observation.       Past Medical History:        Diagnosis Date    Anemia     Arcuate

## 2023-11-17 NOTE — ED NOTES
The following labs were labeled with appropriate pt sticker and tubed to lab:     [] Blue     [x] Lavender   [] on ice  [x] Green/yellow  [] Green/black [] on ice  [] Socorro Collins  [] on ice  [] Yellow  [] Red  [] Type/ Screen  [] ABG  [] VBG    [] COVID-19 swab    [] Rapid  [] PCR  [] Flu swab  [] Peds Viral Panel     [] Urine Sample  [] Fecal Sample  [] Pelvic Cultures  [] Blood Cultures  [] X 2  [] STREP Cultures       Cesar Blackwell RN  11/17/23 [id number]

## 2023-11-18 NOTE — PROGRESS NOTES
Patient stated that she wants to leave, asked to sign out AMA. RN educated patient that they wanted to monitor her overnight. Patient stated \"I know but I want to go\". RN called Doctor covering for [facility], Dr. Arabella Mendoza  informed of situation. Doctor ok with her signing out AMA. AMA paper signed by patient. IV and tele pack removed.

## 2023-11-18 NOTE — DISCHARGE SUMMARY
Discharge Summary    Date: 11/17/2023  Patient Name: Adolph Angel    YOB: 1975     Age: 57 y.o. Admit Date: 11/17/2023  Discharge Date: 11/17/2023  Discharge Condition: Stable    Admission Diagnosis  Chest pain [R07.9]; Chest pain, unspecified type [R07.9]; Hypertensive emergency [I16.1]      Discharge Diagnosis  Principal Problem (Resolved):    Chest pain  Active Problems:    Hypertensive emergency      Hospital Stay  Narrative of Hospital Course:  Patient was being observed for chest pain. She approached the nurses station saying she was signing out [facility]. I did not see her. She had accelerated hypertension on presentation. Her pressure was improving on an increased dose of ACE inhibitor. I did forward prescription to her designated pharmacy to help with her blood pressure. Consultants:  IP CONSULT TO HOSPITALIST  IP CONSULT TO CARDIOLOGY    Surgeries/procedures Performed:      Treatments:            Discharge Plan/Disposition:  Home    Hospital/Incidental Findings Requiring Follow Up:    Patient Instructions:    Diet:    Activity:Activiity as Tolerated, No Driving for Today  For number of days (if applicable): Other Instructions: Patient has left AGAINST MEDICAL ADVICE she is free to return to the hospital should she need our services if not she should at least see her primary care physician for future blood pressure control needs. Provider Follow-Up:   No follow-ups on file.      Significant Diagnostic Studies:    Recent Labs:  Admission on 11/17/2023  WBC                                           Date: 11/17/2023  Value: 5.8         Ref range: 4.5 - 11.5 k/uL    Status: Final  RBC                                           Date: 11/17/2023  Value: 5.01        Ref range: 3.50 - 5.50 m/uL   Status: Final  Hemoglobin                                    Date: 11/17/2023  Value: 12.1        Ref range: 11.5 - 15.5 g/dL   Status: Final  Hematocrit

## 2023-11-20 ENCOUNTER — SCHEDULED TELEPHONE ENCOUNTER (OUTPATIENT)
Dept: OBGYN | Age: 48
End: 2023-11-20

## 2023-11-20 DIAGNOSIS — N93.9 ABNORMAL UTERINE BLEEDING: ICD-10-CM

## 2023-11-20 DIAGNOSIS — D06.9 CIN III (CERVICAL INTRAEPITHELIAL NEOPLASIA GRADE III) WITH SEVERE DYSPLASIA: ICD-10-CM

## 2023-11-20 DIAGNOSIS — R87.613 HSIL (HIGH GRADE SQUAMOUS INTRAEPITHELIAL LESION) ON PAP SMEAR OF CERVIX: Primary | ICD-10-CM

## 2023-11-20 DIAGNOSIS — D21.9 FIBROIDS: ICD-10-CM

## 2023-11-20 PROCEDURE — VIRTUALHLTH VIRTUAL HEALTH SAME DAY: Performed by: OBSTETRICS & GYNECOLOGY

## 2023-11-20 NOTE — PROGRESS NOTES
Documentation:  Spoke to patient to review colposcopy results. Patient with HSIL on pap smear. Colposcopy showed TOSHIA 2-3 at 6 oclock. 10 oclock biopsy showed TOSHIA I, ECC benign. Discussed next step usually would be LEEP procedure. Patient with AUB/ cervical stenosis and complex medical history. She desires a hysterectomy. Patient has an appointment with Dr. Chioma Mantilla at the end of this month. Patient to discuss pap smear and colposcopy results with him. Advised patient to call with any questions or concerns. Total Time: minutes: 5-10 minutes    Bonita Serrato was evaluated through a synchronous (real-time) audio encounter. Patient identification was verified at the start of the visit. She (or guardian if applicable) is aware that this is a billable service, which includes applicable co-pays. This visit was conducted with the patient's (and/or legal guardian's) verbal consent. She has not had a related appointment within my department in the past 7 days or scheduled within the next 24 hours. The patient was located at Home: [address]. The provider was located at [facility] (Appt Dept): [address] [facility],  [facility]. Note: not billable if this call serves to triage the patient into an appointment for the relevant concern    Bonita Serrato is a 57 y.o. female evaluated via telephone on 11/20/2023 for Results  .         Oscar Elaine MD

## 2023-12-17 PROBLEM — R29.90 STROKE-LIKE SYMPTOMS: Status: ACTIVE | Noted: 2023-12-17

## 2023-12-18 PROBLEM — I67.83 PRES (POSTERIOR REVERSIBLE ENCEPHALOPATHY SYNDROME): Status: ACTIVE | Noted: 2023-12-18

## 2023-12-18 PROBLEM — I42.2 HYPERTROPHIC CARDIOMYOPATHY (HCC): Status: ACTIVE | Noted: 2023-12-18

## 2023-12-18 PROBLEM — R07.9 CHEST PAIN: Status: ACTIVE | Noted: 2023-12-18

## 2023-12-18 PROBLEM — R55 SYNCOPE AND COLLAPSE: Status: ACTIVE | Noted: 2023-12-18

## 2023-12-29 ENCOUNTER — HOSPITAL ENCOUNTER (EMERGENCY)
Age: 48
Discharge: LEFT BEFORE DISCHARGE | DRG: 058 | End: 2023-12-29
Attending: STUDENT IN AN ORGANIZED HEALTH CARE EDUCATION/TRAINING PROGRAM
Payer: COMMERCIAL

## 2023-12-29 ENCOUNTER — APPOINTMENT (OUTPATIENT)
Dept: GENERAL RADIOLOGY | Age: 48
DRG: 058 | End: 2023-12-29
Payer: COMMERCIAL

## 2023-12-29 VITALS
TEMPERATURE: 98 F | OXYGEN SATURATION: 97 % | RESPIRATION RATE: 16 BRPM | HEART RATE: 70 BPM | SYSTOLIC BLOOD PRESSURE: 95 MMHG | DIASTOLIC BLOOD PRESSURE: 75 MMHG | BODY MASS INDEX: 39.24 KG/M2 | HEIGHT: 67 IN | WEIGHT: 250 LBS

## 2023-12-29 DIAGNOSIS — Z53.21 ELOPED FROM EMERGENCY DEPARTMENT: ICD-10-CM

## 2023-12-29 DIAGNOSIS — R07.9 CHEST PAIN, UNSPECIFIED TYPE: Primary | ICD-10-CM

## 2023-12-29 LAB
ALBUMIN SERPL-MCNC: 3.9 G/DL (ref 3.5–5.2)
ALP SERPL-CCNC: 103 U/L (ref 35–104)
ALT SERPL-CCNC: 10 U/L (ref 0–32)
ANION GAP SERPL CALCULATED.3IONS-SCNC: 12 MMOL/L (ref 7–16)
AST SERPL-CCNC: 12 U/L (ref 0–31)
BASOPHILS # BLD: 0.08 K/UL (ref 0–0.2)
BASOPHILS NFR BLD: 1 % (ref 0–2)
BILIRUB SERPL-MCNC: <0.2 MG/DL (ref 0–1.2)
BNP SERPL-MCNC: 888 PG/ML (ref 0–125)
BUN SERPL-MCNC: 17 MG/DL (ref 6–20)
CALCIUM SERPL-MCNC: 9.1 MG/DL (ref 8.6–10.2)
CHLORIDE SERPL-SCNC: 102 MMOL/L (ref 98–107)
CO2 SERPL-SCNC: 23 MMOL/L (ref 22–29)
CREAT SERPL-MCNC: 0.6 MG/DL (ref 0.5–1)
EOSINOPHIL # BLD: 0.26 K/UL (ref 0.05–0.5)
EOSINOPHILS RELATIVE PERCENT: 3 % (ref 0–6)
ERYTHROCYTE [DISTWIDTH] IN BLOOD BY AUTOMATED COUNT: 19.4 % (ref 11.5–15)
GFR SERPL CREATININE-BSD FRML MDRD: >60 ML/MIN/1.73M2
GLUCOSE SERPL-MCNC: 95 MG/DL (ref 74–99)
HCT VFR BLD AUTO: 36.2 % (ref 34–48)
HGB BLD-MCNC: 10.9 G/DL (ref 11.5–15.5)
IMM GRANULOCYTES # BLD AUTO: <0.03 K/UL (ref 0–0.58)
IMM GRANULOCYTES NFR BLD: 0 % (ref 0–5)
LYMPHOCYTES NFR BLD: 2.34 K/UL (ref 1.5–4)
LYMPHOCYTES RELATIVE PERCENT: 26 % (ref 20–42)
MCH RBC QN AUTO: 24.5 PG (ref 26–35)
MCHC RBC AUTO-ENTMCNC: 30.1 G/DL (ref 32–34.5)
MCV RBC AUTO: 81.3 FL (ref 80–99.9)
MONOCYTES NFR BLD: 0.56 K/UL (ref 0.1–0.95)
MONOCYTES NFR BLD: 6 % (ref 2–12)
NEUTROPHILS NFR BLD: 63 % (ref 43–80)
NEUTS SEG NFR BLD: 5.65 K/UL (ref 1.8–7.3)
PLATELET # BLD AUTO: 416 K/UL (ref 130–450)
PMV BLD AUTO: 10.1 FL (ref 7–12)
POTASSIUM SERPL-SCNC: 4.4 MMOL/L (ref 3.5–5)
PROT SERPL-MCNC: 6.8 G/DL (ref 6.4–8.3)
RBC # BLD AUTO: 4.45 M/UL (ref 3.5–5.5)
SODIUM SERPL-SCNC: 137 MMOL/L (ref 132–146)
TROPONIN I SERPL HS-MCNC: 13 NG/L (ref 0–9)
WBC OTHER # BLD: 8.9 K/UL (ref 4.5–11.5)

## 2023-12-29 PROCEDURE — 84484 ASSAY OF TROPONIN QUANT: CPT

## 2023-12-29 PROCEDURE — 71045 X-RAY EXAM CHEST 1 VIEW: CPT

## 2023-12-29 PROCEDURE — 80053 COMPREHEN METABOLIC PANEL: CPT

## 2023-12-29 PROCEDURE — 93005 ELECTROCARDIOGRAM TRACING: CPT

## 2023-12-29 PROCEDURE — 83880 ASSAY OF NATRIURETIC PEPTIDE: CPT

## 2023-12-29 PROCEDURE — 99285 EMERGENCY DEPT VISIT HI MDM: CPT

## 2023-12-29 PROCEDURE — 85025 COMPLETE CBC W/AUTO DIFF WBC: CPT

## 2023-12-30 ENCOUNTER — APPOINTMENT (OUTPATIENT)
Dept: CT IMAGING | Age: 48
DRG: 058 | End: 2023-12-30
Payer: COMMERCIAL

## 2023-12-30 ENCOUNTER — HOSPITAL ENCOUNTER (INPATIENT)
Age: 48
LOS: 1 days | Discharge: LEFT BEFORE DISCHARGE | DRG: 058 | End: 2023-12-30
Attending: STUDENT IN AN ORGANIZED HEALTH CARE EDUCATION/TRAINING PROGRAM | Admitting: INTERNAL MEDICINE
Payer: COMMERCIAL

## 2023-12-30 VITALS
SYSTOLIC BLOOD PRESSURE: 150 MMHG | HEART RATE: 57 BPM | DIASTOLIC BLOOD PRESSURE: 70 MMHG | TEMPERATURE: 97 F | RESPIRATION RATE: 18 BRPM | OXYGEN SATURATION: 97 %

## 2023-12-30 DIAGNOSIS — R07.9 CHEST PAIN, UNSPECIFIED TYPE: ICD-10-CM

## 2023-12-30 DIAGNOSIS — R47.81 SLURRED SPEECH: Primary | ICD-10-CM

## 2023-12-30 DIAGNOSIS — R20.2 PARESTHESIA OF UPPER EXTREMITY: ICD-10-CM

## 2023-12-30 PROBLEM — E78.49 OTHER HYPERLIPIDEMIA: Status: ACTIVE | Noted: 2023-12-30

## 2023-12-30 PROBLEM — R20.0 NUMBNESS, LIMB: Status: ACTIVE | Noted: 2023-12-30

## 2023-12-30 PROBLEM — Z86.73 HISTORY OF TRANSIENT ISCHEMIC ATTACK (TIA): Status: ACTIVE | Noted: 2023-12-30

## 2023-12-30 PROBLEM — I10 HTN (HYPERTENSION): Status: ACTIVE | Noted: 2023-12-30

## 2023-12-30 LAB
ALBUMIN SERPL-MCNC: 3.7 G/DL (ref 3.5–5.2)
ALP SERPL-CCNC: 96 U/L (ref 35–104)
ALT SERPL-CCNC: 9 U/L (ref 0–32)
ANION GAP SERPL CALCULATED.3IONS-SCNC: 10 MMOL/L (ref 7–16)
AST SERPL-CCNC: 12 U/L (ref 0–31)
BASOPHILS # BLD: 0.07 K/UL (ref 0–0.2)
BASOPHILS NFR BLD: 1 % (ref 0–2)
BILIRUB SERPL-MCNC: 0.2 MG/DL (ref 0–1.2)
BUN SERPL-MCNC: 16 MG/DL (ref 6–20)
CALCIUM SERPL-MCNC: 8.8 MG/DL (ref 8.6–10.2)
CHLORIDE SERPL-SCNC: 101 MMOL/L (ref 98–107)
CHP ED QC CHECK: NORMAL
CO2 SERPL-SCNC: 26 MMOL/L (ref 22–29)
CREAT SERPL-MCNC: 0.7 MG/DL (ref 0.5–1)
EKG ATRIAL RATE: 58 BPM
EKG P AXIS: 9 DEGREES
EKG P-R INTERVAL: 162 MS
EKG Q-T INTERVAL: 434 MS
EKG QRS DURATION: 110 MS
EKG QTC CALCULATION (BAZETT): 426 MS
EKG R AXIS: 12 DEGREES
EKG T AXIS: 149 DEGREES
EKG VENTRICULAR RATE: 58 BPM
EOSINOPHIL # BLD: 0.26 K/UL (ref 0.05–0.5)
EOSINOPHILS RELATIVE PERCENT: 3 % (ref 0–6)
ERYTHROCYTE [DISTWIDTH] IN BLOOD BY AUTOMATED COUNT: 19.7 % (ref 11.5–15)
GFR SERPL CREATININE-BSD FRML MDRD: >60 ML/MIN/1.73M2
GLUCOSE BLD-MCNC: 146 MG/DL
GLUCOSE BLD-MCNC: 146 MG/DL (ref 74–99)
GLUCOSE SERPL-MCNC: 116 MG/DL (ref 74–99)
HCT VFR BLD AUTO: 33.8 % (ref 34–48)
HGB BLD-MCNC: 10.3 G/DL (ref 11.5–15.5)
IMM GRANULOCYTES # BLD AUTO: 0.03 K/UL (ref 0–0.58)
IMM GRANULOCYTES NFR BLD: 0 % (ref 0–5)
LYMPHOCYTES NFR BLD: 2.2 K/UL (ref 1.5–4)
LYMPHOCYTES RELATIVE PERCENT: 26 % (ref 20–42)
MCH RBC QN AUTO: 25 PG (ref 26–35)
MCHC RBC AUTO-ENTMCNC: 30.5 G/DL (ref 32–34.5)
MCV RBC AUTO: 82 FL (ref 80–99.9)
MONOCYTES NFR BLD: 0.53 K/UL (ref 0.1–0.95)
MONOCYTES NFR BLD: 6 % (ref 2–12)
NEUTROPHILS NFR BLD: 63 % (ref 43–80)
NEUTS SEG NFR BLD: 5.37 K/UL (ref 1.8–7.3)
PLATELET # BLD AUTO: 400 K/UL (ref 130–450)
PMV BLD AUTO: 10 FL (ref 7–12)
POTASSIUM SERPL-SCNC: 3.9 MMOL/L (ref 3.5–5)
PROT SERPL-MCNC: 6.4 G/DL (ref 6.4–8.3)
RBC # BLD AUTO: 4.12 M/UL (ref 3.5–5.5)
SODIUM SERPL-SCNC: 137 MMOL/L (ref 132–146)
TROPONIN I SERPL HS-MCNC: 12 NG/L (ref 0–9)
TROPONIN I SERPL HS-MCNC: 12 NG/L (ref 0–9)
WBC OTHER # BLD: 8.5 K/UL (ref 4.5–11.5)

## 2023-12-30 PROCEDURE — 70450 CT HEAD/BRAIN W/O DYE: CPT

## 2023-12-30 PROCEDURE — 80053 COMPREHEN METABOLIC PANEL: CPT

## 2023-12-30 PROCEDURE — 93005 ELECTROCARDIOGRAM TRACING: CPT

## 2023-12-30 PROCEDURE — 1200000000 HC SEMI PRIVATE

## 2023-12-30 PROCEDURE — 82962 GLUCOSE BLOOD TEST: CPT

## 2023-12-30 PROCEDURE — 84484 ASSAY OF TROPONIN QUANT: CPT

## 2023-12-30 PROCEDURE — 85025 COMPLETE CBC W/AUTO DIFF WBC: CPT

## 2023-12-30 PROCEDURE — 99285 EMERGENCY DEPT VISIT HI MDM: CPT

## 2023-12-30 RX ORDER — ATORVASTATIN CALCIUM 40 MG/1
40 TABLET, FILM COATED ORAL NIGHTLY
Status: CANCELLED | OUTPATIENT
Start: 2023-12-30

## 2023-12-30 RX ORDER — MINOXIDIL 2.5 MG/1
2.5 TABLET ORAL 3 TIMES DAILY
Status: CANCELLED | OUTPATIENT
Start: 2023-12-30

## 2023-12-30 RX ORDER — LISINOPRIL 10 MG/1
40 TABLET ORAL DAILY
Status: CANCELLED | OUTPATIENT
Start: 2023-12-30

## 2023-12-30 RX ORDER — CARVEDILOL 6.25 MG/1
25 TABLET ORAL 2 TIMES DAILY WITH MEALS
Status: CANCELLED | OUTPATIENT
Start: 2023-12-30

## 2023-12-30 RX ORDER — HYDRALAZINE HYDROCHLORIDE 25 MG/1
75 TABLET, FILM COATED ORAL EVERY 8 HOURS SCHEDULED
Status: CANCELLED | OUTPATIENT
Start: 2023-12-30

## 2023-12-30 ASSESSMENT — LIFESTYLE VARIABLES: HOW OFTEN DO YOU HAVE A DRINK CONTAINING ALCOHOL: NEVER

## 2023-12-30 NOTE — H&P
[facility] History and Physical      CHIEF COMPLAINT: Slurred speech and bilateral hand and feet numbness. History of Present Illness: This is a 28-year-old female with a past medical history of anemia, arthritis, COPD, depression, epilepsy, factor VIII deficiency, H. pylori infection, blood clots, HLD, HTN, and TIA who presents to the ER today with complaints of bilateral extremity numbness slurred speech. Patient was recently hospitalized 12/17/2023 - 12/22/2023 for the same issue as well as hypertension-she was seen inpatient by neurology and cardiology and discharged home. Informant(s) for H&P:***    REVIEW OF SYSTEMS:  A comprehensive review of systems was negative except for: what is in the HPI  ***    PMH:  Past Medical History:   Diagnosis Date    Anemia     Arcuate uterus     Per Patient. Also known Hx Fibroids. Arthritis     osteoarthritis - take ibuprophen    Chronic obstructive pulmonary disease ([address]) 9/22/2023    Depression     Bouts in past, meds in past    Epilepsy ([address])     As a child, was on depakote. last Seizure was maybe 10-12 yo    Factor VIII deficiency ([address])     H. pylori infection 2018    per pt. Hx of blood clots 2014    carotid artery blood clot was on coumadin approx 1 yr    Hx of cardiovascular stress test 10/07/2015    lexiscan    Hyperlipidemia     mildly elevated    Hypertension     Infertility, female     \"They told me i could never get pregnant because of the fibroids and my uterus\". TIA (transient ischemic attack)     x2  last one 2014  no deficits         Surgical History:  Past Surgical History:   Procedure Laterality Date    APPENDECTOMY      CHOLECYSTECTOMY      KNEE ARTHROSCOPY Left 09/15/2017    left knee arthroscopy with chondroplasty, synovectomy and lateral meniscectomy    TONSILLECTOMY         Medications Prior to Admission:    Prior to Admission medications    Medication Sig Start Date End Date Taking?  Authorizing Provider

## 2023-12-30 NOTE — ED PROVIDER NOTES
Taryn      Pt Name: Joe Bennett  MRN: [id number]  [address] [facility] [place] 1975  Date of evaluation: 12/30/2023  Provider: Jennifer Parra DO  PCP: Lida Lepe DO    HPI: 70-year-old female presenting emerged part complaints of change in speech. Associated with paresthesias of upper and lower extremities. Patient was seen yesterday in the emergency department and eloped from the department. Patient returns today reporting that slurring of speech worsening today. Patient previous history of TIAs. Reports that her last known well was 4 days ago per patient report. Patient with associated chest discomfort. Reports that she has history of cardiomyopathy and was placed on a LifeVest however left her LifeVest in the car, counseled patient regarding importance of wearing this to prevent arrhythmias. Alert oriented x 4. No focal deficits noted on exam with no facial asymmetry however change in speech per patient. Extraocular movements intact, moving all extremities upper and lower. Gross sensation intact however patient reporting subjective diminished from patient's baseline. Chief Complaint   Patient presents with    Aphasia     Slurred speech and numbness to hands and feet. Was seen yesterday in ER and was to be admitted but eloped from ER and came back this am with same complaint. HX HTN/TIA        Review of Systems   Constitutional:  Negative for chills, fatigue and fever. HENT:  Negative for congestion, rhinorrhea, sore throat, trouble swallowing and voice change. Eyes:  Negative for photophobia, discharge, redness and visual disturbance. Respiratory:  Negative for cough, shortness of breath and wheezing. Cardiovascular:  Negative for chest pain and palpitations. Gastrointestinal:  Negative for abdominal pain, diarrhea, nausea and vomiting.    Genitourinary:  Negative for dysuria, flank pain, achievable. Noncontrast CT of the head with reconstructed 2-D images are also provided for review. COMPARISON: None. HISTORY: ORDERING SYSTEM PROVIDED HISTORY: stroke TECHNOLOGIST PROVIDED HISTORY: Reason for exam:->stroke Has a \"code stroke\" or \"stroke alert\" been called?->Yes What reading provider will be dictating this exam?->CRC; FINDINGS: CT HEAD: BRAIN/VENTRICLES:  No acute intracranial hemorrhage or extraaxial fluid collection. Grey-white differentiation is maintained.  No evidence of mass, mass effect or midline shift.  No evidence of hydrocephalus. ORBITS: The visualized portion of the orbits demonstrate no acute abnormality. SINUSES:  The visualized paranasal sinuses and mastoid air cells demonstrate no acute abnormality. SOFT TISSUES/SKULL: No acute abnormality of the visualized skull or soft tissues. CTA NECK: AORTIC ARCH/ARCH VESSELS: No dissection or arterial injury.  No significant stenosis of the brachiocephalic or subclavian arteries. CAROTID ARTERIES: No dissection, arterial injury, or hemodynamically significant stenosis by NASCET criteria. VERTEBRAL ARTERIES: No dissection, arterial injury, or significant stenosis. SOFT TISSUES: The lung apices are clear.  No cervical or superior mediastinal lymphadenopathy.  The larynx and pharynx are unremarkable.  No acute abnormality of the salivary and thyroid glands. BONES: No acute osseous abnormality. CTA HEAD: ANTERIOR CIRCULATION: No significant stenosis of the intracranial internal carotid, anterior cerebral, or middle cerebral arteries. No aneurysm.  The anterior communicating artery is absent, a variant of normal. POSTERIOR CIRCULATION: No significant stenosis of the vertebral, basilar, or posterior cerebral arteries. No aneurysm.  I cannot identify either of the posterior communicating arteries, a variant of normal.  The vertebral arteries are codominant. OTHER: No dural venous sinus thrombosis on this non-dedicated study.     1. Normal CT of the

## 2023-12-30 NOTE — ED NOTES
Radiology Procedure Waiver   Name: Abi Wilson  : 1975  MRN: [id number]    Date:  23    Time: 10:31 AM EST    Benefits of immediately proceeding with radiology exam(s) without pre-testing outweigh the risks or are not indicated as specified below and therefore the following is/are being waived:    [x] Benefits of immediate radiology exam(s) outweigh any risk. OR    Pre-exam testing is not indicated for the following reason(s):  [] Pregnancy test   [] Patients LMP on-time and regular.   [] Patient had Tubal Ligation or has other Contraception Device. [] Patient  is Menopausal or Premenarcheal.    [] Patient had Full or Partial Hysterectomy. [] Protocol for CT contrast allegry   [] Patient has tolerated well previously   [] Patient does not have a true allergy    [] MRI Questionnaire     [] BUN/Creatinine   [] Patient age w/no hx of renal dysfunction. [] Patient on Dialysis. [] Recent Normal Labs.   Electronically signed by Jonathan Nunez DO on 23 at 10:31 AM EST

## 2023-12-30 NOTE — ED NOTES
Pt leaving AMA. Stated \"They've done all the tests, so why would I stay? \" Education provided by this RN and Resident ineffective.

## 2024-01-01 ENCOUNTER — OFFICE VISIT (OUTPATIENT)
Dept: CARDIOLOGY | Facility: CLINIC | Age: 49
End: 2024-01-01
Payer: COMMERCIAL

## 2024-01-01 ENCOUNTER — HOSPITAL ENCOUNTER (OUTPATIENT)
Dept: RADIOLOGY | Facility: HOSPITAL | Age: 49
End: 2024-01-01
Payer: COMMERCIAL

## 2024-01-01 VITALS
BODY MASS INDEX: 40.34 KG/M2 | OXYGEN SATURATION: 95 % | SYSTOLIC BLOOD PRESSURE: 162 MMHG | HEART RATE: 75 BPM | WEIGHT: 257 LBS | HEIGHT: 67 IN | DIASTOLIC BLOOD PRESSURE: 82 MMHG

## 2024-01-01 DIAGNOSIS — I42.2 CARDIOMYOPATHY, HYPERTROPHIC (MULTI): Primary | ICD-10-CM

## 2024-01-01 LAB
ATRIAL RATE: 67 BPM
P AXIS: 57 DEGREES
P OFFSET: 200 MS
P ONSET: 142 MS
PR INTERVAL: 152 MS
Q ONSET: 218 MS
QRS COUNT: 11 BEATS
QRS DURATION: 98 MS
QT INTERVAL: 440 MS
QTC CALCULATION(BAZETT): 464 MS
QTC FREDERICIA: 456 MS
R AXIS: 51 DEGREES
T AXIS: 12 DEGREES
T OFFSET: 438 MS
VENTRICULAR RATE: 67 BPM

## 2024-01-01 PROCEDURE — 93005 ELECTROCARDIOGRAM TRACING: CPT | Performed by: INTERNAL MEDICINE

## 2024-01-01 PROCEDURE — 93010 ELECTROCARDIOGRAM REPORT: CPT | Performed by: INTERNAL MEDICINE

## 2024-01-01 PROCEDURE — 99204 OFFICE O/P NEW MOD 45 MIN: CPT | Performed by: INTERNAL MEDICINE

## 2024-01-01 PROCEDURE — 99214 OFFICE O/P EST MOD 30 MIN: CPT | Performed by: INTERNAL MEDICINE

## 2024-01-01 RX ORDER — HYDRALAZINE HYDROCHLORIDE 25 MG/1
3 TABLET, FILM COATED ORAL EVERY 8 HOURS
COMMUNITY
Start: 2023-01-01

## 2024-01-01 RX ORDER — CARVEDILOL 25 MG/1
25 TABLET ORAL
COMMUNITY
Start: 2023-01-01

## 2024-01-01 RX ORDER — LISINOPRIL 40 MG/1
1 TABLET ORAL DAILY
COMMUNITY
Start: 2023-01-01

## 2024-01-01 RX ORDER — ATORVASTATIN CALCIUM 40 MG/1
1 TABLET, FILM COATED ORAL NIGHTLY
COMMUNITY
Start: 2023-01-01

## 2024-01-01 ASSESSMENT — PAIN SCALES - GENERAL: PAINLEVEL: 10-WORST PAIN EVER

## 2024-01-01 ASSESSMENT — ENCOUNTER SYMPTOMS
OCCASIONAL FEELINGS OF UNSTEADINESS: 1
DEPRESSION: 0
LOSS OF SENSATION IN FEET: 0

## 2024-01-01 ASSESSMENT — COLUMBIA-SUICIDE SEVERITY RATING SCALE - C-SSRS
2. HAVE YOU ACTUALLY HAD ANY THOUGHTS OF KILLING YOURSELF?: NO
1. IN THE PAST MONTH, HAVE YOU WISHED YOU WERE DEAD OR WISHED YOU COULD GO TO SLEEP AND NOT WAKE UP?: NO
6. HAVE YOU EVER DONE ANYTHING, STARTED TO DO ANYTHING, OR PREPARED TO DO ANYTHING TO END YOUR LIFE?: NO

## 2024-01-02 LAB
EKG ATRIAL RATE: 58 BPM
EKG ATRIAL RATE: 69 BPM
EKG P AXIS: 63 DEGREES
EKG P AXIS: 9 DEGREES
EKG P-R INTERVAL: 152 MS
EKG P-R INTERVAL: 162 MS
EKG Q-T INTERVAL: 384 MS
EKG Q-T INTERVAL: 434 MS
EKG QRS DURATION: 102 MS
EKG QRS DURATION: 110 MS
EKG QTC CALCULATION (BAZETT): 411 MS
EKG QTC CALCULATION (BAZETT): 426 MS
EKG R AXIS: 12 DEGREES
EKG R AXIS: 54 DEGREES
EKG T AXIS: -135 DEGREES
EKG T AXIS: 149 DEGREES
EKG VENTRICULAR RATE: 58 BPM
EKG VENTRICULAR RATE: 69 BPM

## 2024-01-02 PROCEDURE — 93010 ELECTROCARDIOGRAM REPORT: CPT | Performed by: INTERNAL MEDICINE

## 2024-01-03 NOTE — ED PROVIDER NOTES
There is no acute intracranial hemorrhage, mass effect or midline shift.  No abnormal extra-axial fluid collection.  The gray-white differentiation is maintained without evidence of an acute infarct.  There is no evidence of hydrocephalus. ORBITS: The visualized portion of the orbits demonstrate no acute abnormality. SINUSES: The visualized paranasal sinuses and mastoid air cells demonstrate no acute abnormality. SOFT TISSUES/SKULL:  No acute abnormality of the visualized skull or soft tissues.     No acute intracranial abnormality.     XR CHEST 1 VIEW    Result Date: 12/29/2023  EXAMINATION: ONE XRAY VIEW OF THE CHEST 12/29/2023 7:41 pm COMPARISON: None. HISTORY: ORDERING SYSTEM PROVIDED HISTORY: chest pain TECHNOLOGIST PROVIDED HISTORY: Reason for exam:->chest pain What reading provider will be dictating this exam?->CRC FINDINGS: The lungs are without acute focal process.  There is no effusion or pneumothorax.  Stable heart size.  The osseous structures are without acute process.     No acute process.       No results found.    PROCEDURES   Unless otherwise noted below, none     CRITICAL CARE TIME (.cct)   Per attending attestation    EMERGENCY DEPARTMENT COURSE    Vitals:    Vitals:    12/29/23 1653 12/29/23 1713   BP:  95/75   Pulse: 70    Resp:  16   Temp: 98 °F (36.7 °C)    TempSrc: Temporal    SpO2: 97%    Weight:  113.4 kg (250 lb)   Height:  1.702 m (5' 7\")       Patient was given the following medications:  Medications - No data to display      Is this patient to be included in the SEP-1 core measure due to severe sepsis or septic shock? No Exclusion criteria - the patient is NOT to be included for SEP-1 Core Measure due to: Infection is not suspected        Medical Decision Making/Differential Diagnosis:    CC/HPI Summary, Social Determinants of health, Records Reviewed, DDx, testing done/not done, ED Course, Reassessment, disposition considerations/shared decision making with patient, consults,  reevaluate patient appears the patient had eloped from emergency department this time.  Call was placed to patient to inform her plan for admission.  Patient was instructed to return to emergency department.  Patient's condition at time of elopement is unknown.    Disposition Considerations (Tests not ordered but considered, Shared Decision Making, Pt Expectation of Test or Tx.): Plan was to admit patient for abnormal EKG however patient had eloped prior to being able to discuss results.  Patient's condition at time of elopement is unknown  FINAL IMPRESSION      1. Chest pain, unspecified type    2. Eloped from emergency department          DISPOSITION/PLAN     DISPOSITION Eloped - Left Before Treatment Complete 12/29/2023 10:56:35 PM    PATIENT REFERRED TO:  No follow-up provider specified.    DISCHARGE MEDICATIONS:  Discharge Medication List as of 12/29/2023 11:00 PM          DISCONTINUED MEDICATIONS:  Discharge Medication List as of 12/29/2023 11:00 PM               (Please note that portions of this note were completed with a voice recognition program.  Efforts were made to edit the dictations but occasionally words are mis-transcribed.)    José Miguel Menon DO (electronically signed)       José Miguel Menon DO  01/03/24 [id number]

## 2024-01-11 NOTE — PATIENT INSTRUCTIONS
It was nice to meet you today!  We discussed that you have heart wall thickening but this is not diagnostic for hypertrophic cardiomyopathy.  In some cases this is due to undertreated or long standing high blood pressure.      A cardiac MRI will help us determine a diagnosis for you which will help us decide about whether the extra heart beats requireany further therapy.  Please schedule this at [facility].     You met with Sharon Mckenna CNP a genetics counselor to review the role of genetic testing for inherited heart conditions.  If we determine that you could have an inherited heart condition you may be offered genetic testing.

## 2024-01-11 NOTE — PROGRESS NOTES
"  [facility] [place]   Hypertrophic Cardiomyopathy Center    Primary Care Physician: Dr Potocki  Primary Cardiologist:  Dr Kohli   Date of Visit: 2024  9:00 AM EST     HPI / Summary:   Tanai Lainez is a 48 y.o. female     Diagnosed with HTN in  -    - at [facility] she was diagnosed with thickening and some valvular regurgitation    She has had difficulty with Bp control.  She has a significant smoking history as well. She had cardiac catheterization for evaluation of chest pain by Dr Moraes and this showed only nonobstructive disease but LVEDP was 20 mm Hg.  Recently she was admitted and had several ED visits for ( stroke like symptoms ) they added minoxidil but this was discontinued last Tuesday.  She is now on hydralazine 75 mg tid.      She has had chest pain and describes \"shocks that travel to her legs and back to her heart\".     Played sports in high school. No issues.     She reports shortness of breath with walking. No orthopnea. Perhaps PND. Sleeps on 2 pillows.  No leg swelling. Syncope did occur when she was experiencing TIAs but this was in the setting of HTN crisis.     ROS: Relevant review of symptoms of negative unless discussed above.     Problems:   HTN      Medical History:   She reports a bleeding disorder (she reports Factor VIII but chart reports antiphospholipid AB)  and therefore is not on aspirin.   She reports h/o anemia and has received transfusions.    Surgical Hx:   Past Surgical History:   Procedure Laterality Date    CT ABDOMEN PELVIS ANGIOGRAM W AND/OR WO IV CONTRAST  2023    CT ABDOMEN PELVIS ANGIOGRAM W AND/OR WO IV CONTRAST 2023    CT ANGIO NECK  2023    CT ANGIO NECK 2023    CT HEAD ANGIO W AND WO IV CONTRAST  2023    CT HEAD ANGIO W AND WO IV CONTRAST 2023        Family Hx:     Biologic parents: Father  ocular cancer; Mother  due to diabetes/heart disease  Both paternal " and maternal grandfathers had heart diseaseThere no family hx of SCD in first degree relatives but paternal uncle  at 40's. Attributed to MI.    Siblings: 2 brothers - one with cardiac thickening  No children  One nephew with severe HTN, spina bifida (18 yo)      Social Hx:  Caffeine: 1 c /d  Smoking: cigarettes: 1/2 pack a day   30 yrs smoking hx  ETOH: None  Occupation: currently unemployed  [employer]   Drugs: none  Exercise: none regular   Social hx: lives with boyfriend    Medications  Lisinopril, carvedilol, hydralazine, atorvastatin     Allergies  Azithromycin, codeine, fentanyl, penicillin, sulfa    Exam:   Vitals:   GEN: Pleasant, well-appearing, no acute distress., wearing a lifevest  HEENT: JVP not elevated  CHEST: Diffuse wheezing, no rales, good air movement.  CV: Regular rate, normal rhythm, soft systolic murmur Rusb non radiating,    EXT: Warm, well perfused, No LE edema.   NEURO: grossly non focal  SKIN: No obvious rashes     Labs:   Hemoglobin A1C  Lab Results   Component Value Date    HGBA1C 6.1 (H) 2023       CMP:  Cr 0.7 mg/dl  2023  K 3.9  Na 137     EKG:  ECG: NSR 67 bpm nonspecific TW abnormality     Echo:  -    Ambulatory Rhythm Monitor:   -    Cardiac MRI:  - no previous evaluation    Catheterization:  -Patient Name:     ONDINA PALMA Performing Physician:  [id number] Moraes MD  Study Date:       2023             Verifying Physician:   Ivy Moraes MD  MRN/PID:          [id number]             Cardiologist/Co-scrub:  Accession/Order#: [id number]            Fellow:  YOB: 1975            Fellow:  Gender:           F                    Referring Physician:   KRZYSZTOF MORAES  Surgeon:                               Referring Physician:   Jax Kohli MD        Study: Left Heart Catheterization        Indications:  ONDINA PALMA is a 48 year old female who presents with chronic pulmonary disease, obesity, hypertension and a chest  pain assessment of atypical angina. New onset angina <=2 months.     Procedure Description:  After infiltration with 2% Lidocaine, the right radial artery was cannulated with a modified Seldinger technique. Subsequently a 6 Slovenian sheath was placed in the right radial artery. Selective coronary catheterization was performed using a 5 Fr catheter(s) exchanged over a guide wire to cannulate the coronary arteries.  After completion of the procedure, the arterial sheath was pulled and a TR Band Radial Compression Device was utilized to obtain patent hemostasis.     Coronary Angiography:  The coronary circulation is right dominant.     Left Main Coronary Artery:  The left main coronary artery showed no significant disease or stenosis greater than 30%.     Left Anterior Descending Coronary Artery Distribution:  The LAD demonstrated no significant disease or stenosis greater than 30%.     Circumflex Coronary Artery Distribution:  The circumflex revealed no significant disease or stenosis greater than 30%.     Right Coronary Artery Distribution:     The RCA showed no significant disease or stenosis greater than 30%.     Hemo Personnel:  +---------------------+-------------+  Name                 Duty           +---------------------+-------------+  Jorge Moraes MD, MD 1  +---------------------+-------------+  Jax FordeR     PROC SCRUB 1  +---------------------+-------------+  Julisa Wilson RN   PROC CIRC 1  +---------------------+-------------+  Lesia Lai RN PROC RECORD 1  +---------------------+-------------+  Dasia Marcial RT(R)PROC RECORD 2  +---------------------+-------------+        Sedation Time:  +------------------------+----------------------------------------+  Sedation Start/End TimesTime                                      +------------------------+----------------------------------------+  Start                   8/4/2023 10:37:24                          +------------------------+----------------------------------------+  Drugs                   Versed 2 mg IV per physician for sedatio  +------------------------+----------------------------------------+  End                     8/4/2023 10:52:47                         +------------------------+----------------------------------------+        Fluoroscopy Time:  +--------------------------+--------+  X-Ray Summary Fluoro Time:4.60 min  +--------------------------+--------+        +----------+--------+  Contrast: Dose:     +----------+--------+  Omnipaque:60.00 ml  +----------+--------+        Hemodynamic Pressures:     +----+-------------------+---------+------------+-------------+------+---------+  Site     Date Time       Phase    Systolic    Diastolic    ED  Mean mmHg                           Name       mmHg        mmHg      mmHg            +----+-------------------+---------+------------+-------------+------+---------+    LV  8/4/2023 10:45:12 AIR REST         166            8    20                                AM                                                   +----+-------------------+---------+------------+-------------+------+---------+    LV  8/4/2023 10:45:21 AIR REST         188           11    21                                AM                                                   +----+-------------------+---------+------------+-------------+------+---------+   LVp  8/4/2023 10:45:24 AIR REST         203           98   100                                AM                                                   +----+-------------------+---------+------------+-------------+------+---------+   AOp  8/4/2023 10:45:31 AIR REST         180           85            120                       AM                                                    +----+-------------------+---------+------------+-------------+------+---------+    AO  8/4/2023 10:45:40 AIR REST         178           85            121                       AM                                                   +----+-------------------+---------+------------+-------------+------+---------+    AO  8/4/2023 10:46:58 AIR REST         168           68            112                       AM                                                   +----+-------------------+---------+------------+-------------+------+---------+           Oxygen Saturation %:  +-----------+------------+  Sample SiteHB (g/100ml)  +-----------+------------+      SYS ART        10.2  +-----------+------------+      SYS FATOU        10.2  +-----------+------------+      PUL ART        10.2  +-----------+------------+      PUL FATOU        10.2  +-----------+------------+        Cardiac Cath Transition of Care Summary:  Post Procedure Diagnosis: No significant angiographic disease.  Blood Loss:               Estimated blood loss during the procedure was 5cc mls.  Specimens Removed:        Number of specimen(s) removed: none.        Recommendations:  Maximize medical therapy; will increase lisinopril to 20mg daily today, f/u with  Dr. Kohli as OP.     ____________________________________________________________________________________  CONCLUSIONS:  1. No significant angiographic coronary disease in right dominant circulation.  2. LVEDP 20mmHg, no aortic stenosis on LV-Ao gradient.       Assessment and Plan  Tania Lainez is a 48 y.o. female referred for evaluaiton of severe concentric LVH in the setting of long standing and poorly controlled BP.   Her brother is referred for similar findings. She does not have any hallmark ECG findings and we are ordering her an MRI for further evaluation.  We will attempt to get the echo images from Shelton Kohli's office and once imaging is  obtained we will present to HCM board.   She met with Sharon Mckenna today and if we determine that her conditon could be hereditary than we will pursue genetic testing.      #Symptoms  Exertional shortness of breath

## 2024-02-21 ENCOUNTER — APPOINTMENT (OUTPATIENT)
Dept: CT IMAGING | Age: 49
DRG: 720 | End: 2024-02-21
Payer: COMMERCIAL

## 2024-02-21 ENCOUNTER — HOSPITAL ENCOUNTER (INPATIENT)
Age: 49
LOS: 2 days | Discharge: HOME | DRG: 720 | End: 2024-02-24
Attending: EMERGENCY MEDICINE | Admitting: INTERNAL MEDICINE
Payer: COMMERCIAL

## 2024-02-21 DIAGNOSIS — J96.01 ACUTE RESPIRATORY FAILURE WITH HYPOXIA (HCC): ICD-10-CM

## 2024-02-21 DIAGNOSIS — I48.92 ATRIAL FLUTTER WITH RAPID VENTRICULAR RESPONSE (HCC): ICD-10-CM

## 2024-02-21 DIAGNOSIS — I48.91 ATRIAL FIBRILLATION WITH RVR (HCC): Primary | ICD-10-CM

## 2024-02-21 DIAGNOSIS — I21.4 NSTEMI (NON-ST ELEVATED MYOCARDIAL INFARCTION) (HCC): ICD-10-CM

## 2024-02-21 DIAGNOSIS — R60.9 SWELLING: ICD-10-CM

## 2024-02-21 DIAGNOSIS — J18.9 PNEUMONIA OF BOTH LOWER LOBES DUE TO INFECTIOUS ORGANISM: ICD-10-CM

## 2024-02-21 LAB
ALBUMIN SERPL-MCNC: 3.5 G/DL (ref 3.5–5.2)
ALP SERPL-CCNC: 123 U/L (ref 35–104)
ALT SERPL-CCNC: 206 U/L (ref 0–32)
ANION GAP SERPL CALCULATED.3IONS-SCNC: 12 MMOL/L (ref 7–16)
AST SERPL-CCNC: 63 U/L (ref 0–31)
BASOPHILS # BLD: 0.02 K/UL (ref 0–0.2)
BASOPHILS NFR BLD: 0 % (ref 0–2)
BILIRUB SERPL-MCNC: 0.6 MG/DL (ref 0–1.2)
BNP SERPL-MCNC: 5904 PG/ML (ref 0–450)
BUN SERPL-MCNC: 16 MG/DL (ref 6–20)
CALCIUM SERPL-MCNC: 8.2 MG/DL (ref 8.6–10.2)
CHLORIDE SERPL-SCNC: 101 MMOL/L (ref 98–107)
CO2 SERPL-SCNC: 24 MMOL/L (ref 22–29)
CREAT SERPL-MCNC: 0.7 MG/DL (ref 0.5–1)
EOSINOPHIL # BLD: 0.02 K/UL (ref 0.05–0.5)
EOSINOPHILS RELATIVE PERCENT: 0 % (ref 0–6)
ERYTHROCYTE [DISTWIDTH] IN BLOOD BY AUTOMATED COUNT: 17.7 % (ref 11.5–15)
GFR SERPL CREATININE-BSD FRML MDRD: >60 ML/MIN/1.73M2
GLUCOSE SERPL-MCNC: 204 MG/DL (ref 74–99)
HCT VFR BLD AUTO: 34.7 % (ref 34–48)
HGB BLD-MCNC: 10.7 G/DL (ref 11.5–15.5)
IMM GRANULOCYTES # BLD AUTO: 0.15 K/UL (ref 0–0.58)
IMM GRANULOCYTES NFR BLD: 1 % (ref 0–5)
INFLUENZA A BY PCR: NOT DETECTED
INFLUENZA B BY PCR: NOT DETECTED
LACTATE BLDV-SCNC: 2.1 MMOL/L (ref 0.5–1.9)
LIPASE SERPL-CCNC: 24 U/L (ref 13–60)
LYMPHOCYTES NFR BLD: 1.84 K/UL (ref 1.5–4)
LYMPHOCYTES RELATIVE PERCENT: 16 % (ref 20–42)
MAGNESIUM SERPL-MCNC: 1.9 MG/DL (ref 1.6–2.6)
MCH RBC QN AUTO: 23.2 PG (ref 26–35)
MCHC RBC AUTO-ENTMCNC: 30.8 G/DL (ref 32–34.5)
MCV RBC AUTO: 75.1 FL (ref 80–99.9)
MONOCYTES NFR BLD: 0.51 K/UL (ref 0.1–0.95)
MONOCYTES NFR BLD: 4 % (ref 2–12)
NEUTROPHILS NFR BLD: 78 % (ref 43–80)
NEUTS SEG NFR BLD: 9.12 K/UL (ref 1.8–7.3)
PLATELET # BLD AUTO: 392 K/UL (ref 130–450)
PMV BLD AUTO: 11.1 FL (ref 7–12)
POTASSIUM SERPL-SCNC: 3.2 MMOL/L (ref 3.5–5)
PROT SERPL-MCNC: 6.5 G/DL (ref 6.4–8.3)
RBC # BLD AUTO: 4.62 M/UL (ref 3.5–5.5)
SARS-COV-2 RDRP RESP QL NAA+PROBE: NOT DETECTED
SODIUM SERPL-SCNC: 137 MMOL/L (ref 132–146)
SPECIMEN DESCRIPTION: NORMAL
TROPONIN I SERPL HS-MCNC: 107 NG/L (ref 0–9)
WBC OTHER # BLD: 11.7 K/UL (ref 4.5–11.5)

## 2024-02-21 PROCEDURE — 36415 COLL VENOUS BLD VENIPUNCTURE: CPT

## 2024-02-21 PROCEDURE — 99285 EMERGENCY DEPT VISIT HI MDM: CPT

## 2024-02-21 PROCEDURE — 6360000002 HC RX W HCPCS: Performed by: EMERGENCY MEDICINE

## 2024-02-21 PROCEDURE — 96374 THER/PROPH/DIAG INJ IV PUSH: CPT

## 2024-02-21 PROCEDURE — 0202U NFCT DS 22 TRGT SARS-COV-2: CPT

## 2024-02-21 PROCEDURE — 96375 TX/PRO/DX INJ NEW DRUG ADDON: CPT

## 2024-02-21 PROCEDURE — 2580000003 HC RX 258: Performed by: INTERNAL MEDICINE

## 2024-02-21 PROCEDURE — 2580000003 HC RX 258: Performed by: EMERGENCY MEDICINE

## 2024-02-21 PROCEDURE — 84484 ASSAY OF TROPONIN QUANT: CPT

## 2024-02-21 PROCEDURE — 86922 COMPATIBILITY TEST ANTIGLOB: CPT

## 2024-02-21 PROCEDURE — 86880 COOMBS TEST DIRECT: CPT

## 2024-02-21 PROCEDURE — 86850 RBC ANTIBODY SCREEN: CPT

## 2024-02-21 PROCEDURE — 74177 CT ABD & PELVIS W/CONTRAST: CPT

## 2024-02-21 PROCEDURE — 96361 HYDRATE IV INFUSION ADD-ON: CPT

## 2024-02-21 PROCEDURE — 87502 INFLUENZA DNA AMP PROBE: CPT

## 2024-02-21 PROCEDURE — 83880 ASSAY OF NATRIURETIC PEPTIDE: CPT

## 2024-02-21 PROCEDURE — 86870 RBC ANTIBODY IDENTIFICATION: CPT

## 2024-02-21 PROCEDURE — 83605 ASSAY OF LACTIC ACID: CPT

## 2024-02-21 PROCEDURE — 2500000003 HC RX 250 WO HCPCS: Performed by: EMERGENCY MEDICINE

## 2024-02-21 PROCEDURE — 85610 PROTHROMBIN TIME: CPT

## 2024-02-21 PROCEDURE — 80053 COMPREHEN METABOLIC PANEL: CPT

## 2024-02-21 PROCEDURE — 86920 COMPATIBILITY TEST SPIN: CPT

## 2024-02-21 PROCEDURE — 85025 COMPLETE CBC W/AUTO DIFF WBC: CPT

## 2024-02-21 PROCEDURE — 96365 THER/PROPH/DIAG IV INF INIT: CPT

## 2024-02-21 PROCEDURE — 83690 ASSAY OF LIPASE: CPT

## 2024-02-21 PROCEDURE — 6360000004 HC RX CONTRAST MEDICATION: Performed by: RADIOLOGY

## 2024-02-21 PROCEDURE — 71275 CT ANGIOGRAPHY CHEST: CPT

## 2024-02-21 PROCEDURE — 86901 BLOOD TYPING SEROLOGIC RH(D): CPT

## 2024-02-21 PROCEDURE — 86900 BLOOD TYPING SEROLOGIC ABO: CPT

## 2024-02-21 PROCEDURE — 87635 SARS-COV-2 COVID-19 AMP PRB: CPT

## 2024-02-21 PROCEDURE — 83735 ASSAY OF MAGNESIUM: CPT

## 2024-02-21 RX ORDER — DILTIAZEM HYDROCHLORIDE 5 MG/ML
10 INJECTION INTRAVENOUS ONCE
Status: COMPLETED | OUTPATIENT
Start: 2024-02-22 | End: 2024-02-22

## 2024-02-21 RX ORDER — ACETAMINOPHEN 325 MG/1
650 TABLET ORAL EVERY 6 HOURS PRN
Status: DISCONTINUED | OUTPATIENT
Start: 2024-02-21 | End: 2024-02-25 | Stop reason: HOSPADM

## 2024-02-21 RX ORDER — 0.9 % SODIUM CHLORIDE 0.9 %
1000 INTRAVENOUS SOLUTION INTRAVENOUS ONCE
Status: COMPLETED | OUTPATIENT
Start: 2024-02-21 | End: 2024-02-21

## 2024-02-21 RX ORDER — ONDANSETRON 2 MG/ML
4 INJECTION INTRAMUSCULAR; INTRAVENOUS ONCE
Status: COMPLETED | OUTPATIENT
Start: 2024-02-21 | End: 2024-02-21

## 2024-02-21 RX ORDER — IPRATROPIUM BROMIDE AND ALBUTEROL SULFATE 2.5; .5 MG/3ML; MG/3ML
1 SOLUTION RESPIRATORY (INHALATION)
Status: DISCONTINUED | OUTPATIENT
Start: 2024-02-22 | End: 2024-02-22

## 2024-02-21 RX ORDER — 0.9 % SODIUM CHLORIDE 0.9 %
1000 INTRAVENOUS SOLUTION INTRAVENOUS ONCE
Status: DISCONTINUED | OUTPATIENT
Start: 2024-02-21 | End: 2024-02-22 | Stop reason: ALTCHOICE

## 2024-02-21 RX ORDER — SODIUM CHLORIDE 0.9 % (FLUSH) 0.9 %
5-40 SYRINGE (ML) INJECTION PRN
Status: DISCONTINUED | OUTPATIENT
Start: 2024-02-21 | End: 2024-02-25 | Stop reason: HOSPADM

## 2024-02-21 RX ORDER — POTASSIUM CHLORIDE 7.45 MG/ML
10 INJECTION INTRAVENOUS PRN
Status: DISCONTINUED | OUTPATIENT
Start: 2024-02-21 | End: 2024-02-25 | Stop reason: HOSPADM

## 2024-02-21 RX ORDER — SODIUM CHLORIDE 0.9 % (FLUSH) 0.9 %
5-40 SYRINGE (ML) INJECTION EVERY 12 HOURS SCHEDULED
Status: DISCONTINUED | OUTPATIENT
Start: 2024-02-22 | End: 2024-02-25 | Stop reason: HOSPADM

## 2024-02-21 RX ORDER — SODIUM CHLORIDE 9 MG/ML
INJECTION, SOLUTION INTRAVENOUS PRN
Status: DISCONTINUED | OUTPATIENT
Start: 2024-02-21 | End: 2024-02-25 | Stop reason: HOSPADM

## 2024-02-21 RX ORDER — CARVEDILOL 25 MG/1
25 TABLET ORAL 2 TIMES DAILY WITH MEALS
Status: DISCONTINUED | OUTPATIENT
Start: 2024-02-22 | End: 2024-02-22

## 2024-02-21 RX ORDER — ACETAMINOPHEN 650 MG/1
650 SUPPOSITORY RECTAL EVERY 6 HOURS PRN
Status: DISCONTINUED | OUTPATIENT
Start: 2024-02-21 | End: 2024-02-25 | Stop reason: HOSPADM

## 2024-02-21 RX ORDER — GLUCAGON 1 MG/ML
1 KIT INJECTION PRN
Status: DISCONTINUED | OUTPATIENT
Start: 2024-02-21 | End: 2024-02-25 | Stop reason: HOSPADM

## 2024-02-21 RX ORDER — DEXTROSE MONOHYDRATE 100 MG/ML
INJECTION, SOLUTION INTRAVENOUS CONTINUOUS PRN
Status: DISCONTINUED | OUTPATIENT
Start: 2024-02-21 | End: 2024-02-25 | Stop reason: HOSPADM

## 2024-02-21 RX ORDER — POLYETHYLENE GLYCOL 3350 17 G/17G
17 POWDER, FOR SOLUTION ORAL DAILY PRN
Status: DISCONTINUED | OUTPATIENT
Start: 2024-02-21 | End: 2024-02-25 | Stop reason: HOSPADM

## 2024-02-21 RX ORDER — MAGNESIUM SULFATE IN WATER 40 MG/ML
2000 INJECTION, SOLUTION INTRAVENOUS PRN
Status: DISCONTINUED | OUTPATIENT
Start: 2024-02-21 | End: 2024-02-25 | Stop reason: HOSPADM

## 2024-02-21 RX ORDER — PROCHLORPERAZINE EDISYLATE 5 MG/ML
10 INJECTION INTRAMUSCULAR; INTRAVENOUS ONCE
Status: COMPLETED | OUTPATIENT
Start: 2024-02-21 | End: 2024-02-21

## 2024-02-21 RX ORDER — LISINOPRIL 20 MG/1
40 TABLET ORAL DAILY
Status: DISCONTINUED | OUTPATIENT
Start: 2024-02-22 | End: 2024-02-22

## 2024-02-21 RX ORDER — POTASSIUM CHLORIDE 20 MEQ/1
40 TABLET, EXTENDED RELEASE ORAL PRN
Status: DISCONTINUED | OUTPATIENT
Start: 2024-02-21 | End: 2024-02-25 | Stop reason: HOSPADM

## 2024-02-21 RX ADMIN — ONDANSETRON 4 MG: 2 INJECTION INTRAMUSCULAR; INTRAVENOUS at 20:06

## 2024-02-21 RX ADMIN — DOXYCYCLINE 100 MG: 100 INJECTION, POWDER, LYOPHILIZED, FOR SOLUTION INTRAVENOUS at 23:32

## 2024-02-21 RX ADMIN — SODIUM CHLORIDE 1000 ML: 9 INJECTION, SOLUTION INTRAVENOUS at 20:05

## 2024-02-21 RX ADMIN — SODIUM CHLORIDE 1000 ML: 9 INJECTION, SOLUTION INTRAVENOUS at 21:40

## 2024-02-21 RX ADMIN — SODIUM CHLORIDE 1000 ML: 9 INJECTION, SOLUTION INTRAVENOUS at 23:56

## 2024-02-21 RX ADMIN — IOPAMIDOL 75 ML: 755 INJECTION, SOLUTION INTRAVENOUS at 21:03

## 2024-02-21 RX ADMIN — PROCHLORPERAZINE EDISYLATE 10 MG: 5 INJECTION INTRAMUSCULAR; INTRAVENOUS at 23:32

## 2024-02-21 RX ADMIN — IOPAMIDOL 75 ML: 755 INJECTION, SOLUTION INTRAVENOUS at 22:59

## 2024-02-21 ASSESSMENT — ENCOUNTER SYMPTOMS
SHORTNESS OF BREATH: 0
NAUSEA: 1
ABDOMINAL PAIN: 1
CHEST TIGHTNESS: 0
VOMITING: 1

## 2024-02-21 ASSESSMENT — PAIN - FUNCTIONAL ASSESSMENT: PAIN_FUNCTIONAL_ASSESSMENT: NONE - DENIES PAIN

## 2024-02-22 ENCOUNTER — APPOINTMENT (OUTPATIENT)
Age: 49
DRG: 720 | End: 2024-02-22
Payer: COMMERCIAL

## 2024-02-22 PROBLEM — I48.91 ATRIAL FIBRILLATION WITH RVR (HCC): Status: ACTIVE | Noted: 2024-02-22

## 2024-02-22 PROBLEM — I48.92 ATRIAL FLUTTER WITH RAPID VENTRICULAR RESPONSE (HCC): Status: ACTIVE | Noted: 2024-02-22

## 2024-02-22 LAB
ALBUMIN SERPL-MCNC: 3.5 G/DL (ref 3.5–5.2)
ALP SERPL-CCNC: 126 U/L (ref 35–104)
ALT SERPL-CCNC: 201 U/L (ref 0–32)
ANION GAP SERPL CALCULATED.3IONS-SCNC: 11 MMOL/L (ref 7–16)
AST SERPL-CCNC: 82 U/L (ref 0–31)
B PARAP IS1001 DNA NPH QL NAA+NON-PROBE: NOT DETECTED
B PERT DNA SPEC QL NAA+PROBE: NOT DETECTED
BASOPHILS # BLD: 0.03 K/UL (ref 0–0.2)
BASOPHILS NFR BLD: 0 % (ref 0–2)
BILIRUB SERPL-MCNC: 0.4 MG/DL (ref 0–1.2)
BUN SERPL-MCNC: 16 MG/DL (ref 6–20)
C PNEUM DNA NPH QL NAA+NON-PROBE: NOT DETECTED
CALCIUM SERPL-MCNC: 7.8 MG/DL (ref 8.6–10.2)
CHLORIDE SERPL-SCNC: 100 MMOL/L (ref 98–107)
CO2 SERPL-SCNC: 23 MMOL/L (ref 22–29)
CREAT SERPL-MCNC: 0.7 MG/DL (ref 0.5–1)
ECHO BSA: 2.34 M2
ECHO LA DIAMETER INDEX: 2.1 CM/M2
ECHO LA DIAMETER: 4.7 CM
ECHO LA VOL A-L A2C: 111 ML (ref 22–52)
ECHO LA VOL A-L A4C: 110 ML (ref 22–52)
ECHO LA VOL MOD A2C: 106 ML (ref 22–52)
ECHO LA VOL MOD A4C: 99 ML (ref 22–52)
ECHO LA VOLUME AREA LENGTH: 120 ML
ECHO LA VOLUME INDEX A-L A2C: 50 ML/M2 (ref 16–34)
ECHO LA VOLUME INDEX A-L A4C: 49 ML/M2 (ref 16–34)
ECHO LA VOLUME INDEX AREA LENGTH: 54 ML/M2 (ref 16–34)
ECHO LA VOLUME INDEX MOD A2C: 47 ML/M2 (ref 16–34)
ECHO LA VOLUME INDEX MOD A4C: 44 ML/M2 (ref 16–34)
ECHO LV FRACTIONAL SHORTENING: 31 % (ref 28–44)
ECHO LV INTERNAL DIMENSION DIASTOLE INDEX: 2.46 CM/M2
ECHO LV INTERNAL DIMENSION DIASTOLIC: 5.5 CM (ref 3.9–5.3)
ECHO LV INTERNAL DIMENSION SYSTOLIC INDEX: 1.7 CM/M2
ECHO LV INTERNAL DIMENSION SYSTOLIC: 3.8 CM
ECHO LV IVSD: 1.8 CM (ref 0.6–0.9)
ECHO LV MASS 2D: 468.7 G (ref 67–162)
ECHO LV MASS INDEX 2D: 209.2 G/M2 (ref 43–95)
ECHO LV POSTERIOR WALL DIASTOLIC: 1.7 CM (ref 0.6–0.9)
ECHO LV RELATIVE WALL THICKNESS RATIO: 0.62
ECHO RV INTERNAL DIMENSION: 2.3 CM
EOSINOPHIL # BLD: 0.01 K/UL (ref 0.05–0.5)
EOSINOPHILS RELATIVE PERCENT: 0 % (ref 0–6)
ERYTHROCYTE [DISTWIDTH] IN BLOOD BY AUTOMATED COUNT: 18.2 % (ref 11.5–15)
FLUAV RNA NPH QL NAA+NON-PROBE: NOT DETECTED
FLUBV RNA NPH QL NAA+NON-PROBE: NOT DETECTED
GFR SERPL CREATININE-BSD FRML MDRD: >60 ML/MIN/1.73M2
GLUCOSE SERPL-MCNC: 188 MG/DL (ref 74–99)
HADV DNA NPH QL NAA+NON-PROBE: NOT DETECTED
HBA1C MFR BLD: 7 % (ref 4–5.6)
HCOV 229E RNA NPH QL NAA+NON-PROBE: NOT DETECTED
HCOV HKU1 RNA NPH QL NAA+NON-PROBE: NOT DETECTED
HCOV NL63 RNA NPH QL NAA+NON-PROBE: NOT DETECTED
HCOV OC43 RNA NPH QL NAA+NON-PROBE: NOT DETECTED
HCT VFR BLD AUTO: 35.1 % (ref 34–48)
HCT VFR BLD AUTO: 36.2 % (ref 34–48)
HGB BLD-MCNC: 10.5 G/DL (ref 11.5–15.5)
HGB BLD-MCNC: 10.7 G/DL (ref 11.5–15.5)
HMPV RNA NPH QL NAA+NON-PROBE: NOT DETECTED
HPIV1 RNA NPH QL NAA+NON-PROBE: NOT DETECTED
HPIV2 RNA NPH QL NAA+NON-PROBE: NOT DETECTED
HPIV3 RNA NPH QL NAA+NON-PROBE: NOT DETECTED
HPIV4 RNA NPH QL NAA+NON-PROBE: NOT DETECTED
IMM GRANULOCYTES # BLD AUTO: 0.25 K/UL (ref 0–0.58)
IMM GRANULOCYTES NFR BLD: 2 % (ref 0–5)
IMM RETICS NFR: 25.1 % (ref 3–15.9)
INR PPP: 1.5
LYMPHOCYTES NFR BLD: 2.32 K/UL (ref 1.5–4)
LYMPHOCYTES RELATIVE PERCENT: 15 % (ref 20–42)
M PNEUMO DNA NPH QL NAA+NON-PROBE: NOT DETECTED
MAGNESIUM SERPL-MCNC: 1.8 MG/DL (ref 1.6–2.6)
MCH RBC QN AUTO: 23 PG (ref 26–35)
MCHC RBC AUTO-ENTMCNC: 29.9 G/DL (ref 32–34.5)
MCV RBC AUTO: 77 FL (ref 80–99.9)
MONOCYTES NFR BLD: 1.2 K/UL (ref 0.1–0.95)
MONOCYTES NFR BLD: 8 % (ref 2–12)
NEUTROPHILS NFR BLD: 76 % (ref 43–80)
NEUTS SEG NFR BLD: 12.11 K/UL (ref 1.8–7.3)
PHOSPHATE SERPL-MCNC: 1.6 MG/DL (ref 2.5–4.5)
PLATELET # BLD AUTO: 373 K/UL (ref 130–450)
PMV BLD AUTO: 11.7 FL (ref 7–12)
POTASSIUM SERPL-SCNC: 3.7 MMOL/L (ref 3.5–5)
PROCALCITONIN SERPL-MCNC: 0.09 NG/ML (ref 0–0.08)
PROT SERPL-MCNC: 6.1 G/DL (ref 6.4–8.3)
PROTHROMBIN TIME: 17.1 SEC (ref 9.3–12.4)
RBC # BLD AUTO: 4.56 M/UL (ref 3.5–5.5)
RETIC HEMOGLOBIN: 19 PG (ref 28.2–36.6)
RETICS # AUTO: 0.08 M/UL
RETICS/RBC NFR AUTO: 1.8 % (ref 0.4–1.9)
RSV RNA NPH QL NAA+NON-PROBE: NOT DETECTED
RV+EV RNA NPH QL NAA+NON-PROBE: NOT DETECTED
SARS-COV-2 RNA NPH QL NAA+NON-PROBE: NOT DETECTED
SODIUM SERPL-SCNC: 134 MMOL/L (ref 132–146)
SPECIMEN DESCRIPTION: NORMAL
T4 FREE SERPL-MCNC: 1.3 NG/DL (ref 0.9–1.7)
TROPONIN I SERPL HS-MCNC: 112 NG/L (ref 0–9)
TSH SERPL DL<=0.05 MIU/L-ACNC: 1.15 UIU/ML (ref 0.27–4.2)
WBC OTHER # BLD: 15.9 K/UL (ref 4.5–11.5)

## 2024-02-22 PROCEDURE — 83735 ASSAY OF MAGNESIUM: CPT

## 2024-02-22 PROCEDURE — 2060000000 HC ICU INTERMEDIATE R&B

## 2024-02-22 PROCEDURE — 84100 ASSAY OF PHOSPHORUS: CPT

## 2024-02-22 PROCEDURE — 80053 COMPREHEN METABOLIC PANEL: CPT

## 2024-02-22 PROCEDURE — 94640 AIRWAY INHALATION TREATMENT: CPT

## 2024-02-22 PROCEDURE — 2580000003 HC RX 258: Performed by: INTERNAL MEDICINE

## 2024-02-22 PROCEDURE — 6360000002 HC RX W HCPCS: Performed by: INTERNAL MEDICINE

## 2024-02-22 PROCEDURE — 85014 HEMATOCRIT: CPT

## 2024-02-22 PROCEDURE — 2500000003 HC RX 250 WO HCPCS: Performed by: INTERNAL MEDICINE

## 2024-02-22 PROCEDURE — 6360000002 HC RX W HCPCS: Performed by: PHYSICIAN ASSISTANT

## 2024-02-22 PROCEDURE — 93308 TTE F-UP OR LMTD: CPT

## 2024-02-22 PROCEDURE — 85045 AUTOMATED RETICULOCYTE COUNT: CPT

## 2024-02-22 PROCEDURE — 87040 BLOOD CULTURE FOR BACTERIA: CPT

## 2024-02-22 PROCEDURE — 2500000003 HC RX 250 WO HCPCS: Performed by: EMERGENCY MEDICINE

## 2024-02-22 PROCEDURE — 84443 ASSAY THYROID STIM HORMONE: CPT

## 2024-02-22 PROCEDURE — 2580000003 HC RX 258: Performed by: EMERGENCY MEDICINE

## 2024-02-22 PROCEDURE — 84439 ASSAY OF FREE THYROXINE: CPT

## 2024-02-22 PROCEDURE — 85018 HEMOGLOBIN: CPT

## 2024-02-22 PROCEDURE — 6370000000 HC RX 637 (ALT 250 FOR IP): Performed by: INTERNAL MEDICINE

## 2024-02-22 PROCEDURE — 93005 ELECTROCARDIOGRAM TRACING: CPT | Performed by: PHYSICIAN ASSISTANT

## 2024-02-22 PROCEDURE — 93308 TTE F-UP OR LMTD: CPT | Performed by: INTERNAL MEDICINE

## 2024-02-22 PROCEDURE — APPSS60 APP SPLIT SHARED TIME 46-60 MINUTES: Performed by: PHYSICIAN ASSISTANT

## 2024-02-22 PROCEDURE — 6370000000 HC RX 637 (ALT 250 FOR IP)

## 2024-02-22 PROCEDURE — 85025 COMPLETE CBC W/AUTO DIFF WBC: CPT

## 2024-02-22 PROCEDURE — 36415 COLL VENOUS BLD VENIPUNCTURE: CPT

## 2024-02-22 PROCEDURE — 99255 IP/OBS CONSLTJ NEW/EST HI 80: CPT | Performed by: INTERNAL MEDICINE

## 2024-02-22 PROCEDURE — 83036 HEMOGLOBIN GLYCOSYLATED A1C: CPT

## 2024-02-22 PROCEDURE — 84145 PROCALCITONIN (PCT): CPT

## 2024-02-22 RX ORDER — METOPROLOL TARTRATE 1 MG/ML
5 INJECTION, SOLUTION INTRAVENOUS EVERY 10 MIN PRN
Status: COMPLETED | OUTPATIENT
Start: 2024-02-22 | End: 2024-02-22

## 2024-02-22 RX ORDER — MORPHINE SULFATE 2 MG/ML
2 INJECTION, SOLUTION INTRAMUSCULAR; INTRAVENOUS EVERY 4 HOURS PRN
Status: DISCONTINUED | OUTPATIENT
Start: 2024-02-22 | End: 2024-02-22 | Stop reason: ALTCHOICE

## 2024-02-22 RX ORDER — LISINOPRIL 20 MG/1
20 TABLET ORAL DAILY
Status: DISCONTINUED | OUTPATIENT
Start: 2024-02-23 | End: 2024-02-25 | Stop reason: HOSPADM

## 2024-02-22 RX ORDER — MINOXIDIL 2.5 MG/1
2.5 TABLET ORAL 3 TIMES DAILY
Status: DISCONTINUED | OUTPATIENT
Start: 2024-02-22 | End: 2024-02-25 | Stop reason: HOSPADM

## 2024-02-22 RX ORDER — DIGOXIN 0.25 MG/ML
250 INJECTION INTRAMUSCULAR; INTRAVENOUS
Status: COMPLETED | OUTPATIENT
Start: 2024-02-22 | End: 2024-02-22

## 2024-02-22 RX ORDER — METOPROLOL SUCCINATE 50 MG/1
100 TABLET, EXTENDED RELEASE ORAL DAILY
Status: DISCONTINUED | OUTPATIENT
Start: 2024-02-22 | End: 2024-02-24

## 2024-02-22 RX ORDER — IPRATROPIUM BROMIDE AND ALBUTEROL SULFATE 2.5; .5 MG/3ML; MG/3ML
1 SOLUTION RESPIRATORY (INHALATION)
Status: DISCONTINUED | OUTPATIENT
Start: 2024-02-22 | End: 2024-02-25 | Stop reason: HOSPADM

## 2024-02-22 RX ORDER — ENOXAPARIN SODIUM 150 MG/ML
1 INJECTION SUBCUTANEOUS 2 TIMES DAILY
Status: DISCONTINUED | OUTPATIENT
Start: 2024-02-22 | End: 2024-02-23

## 2024-02-22 RX ORDER — AMIODARONE HYDROCHLORIDE 200 MG/1
200 TABLET ORAL 2 TIMES DAILY
Status: DISCONTINUED | OUTPATIENT
Start: 2024-02-22 | End: 2024-02-24

## 2024-02-22 RX ORDER — ENOXAPARIN SODIUM 150 MG/ML
1 INJECTION SUBCUTANEOUS 2 TIMES DAILY
Status: DISCONTINUED | OUTPATIENT
Start: 2024-02-22 | End: 2024-02-22 | Stop reason: SDUPTHER

## 2024-02-22 RX ORDER — ENOXAPARIN SODIUM 150 MG/ML
1 INJECTION SUBCUTANEOUS ONCE
Status: DISCONTINUED | OUTPATIENT
Start: 2024-02-22 | End: 2024-02-22

## 2024-02-22 RX ADMIN — ENOXAPARIN SODIUM 120 MG: 150 INJECTION SUBCUTANEOUS at 21:17

## 2024-02-22 RX ADMIN — AMIODARONE HYDROCHLORIDE 200 MG: 200 TABLET ORAL at 21:17

## 2024-02-22 RX ADMIN — HYDRALAZINE HYDROCHLORIDE 75 MG: 50 TABLET, FILM COATED ORAL at 13:17

## 2024-02-22 RX ADMIN — DILTIAZEM HYDROCHLORIDE 10 MG: 5 INJECTION, SOLUTION INTRAVENOUS at 00:13

## 2024-02-22 RX ADMIN — DIGOXIN 250 MCG: 0.25 INJECTION INTRAMUSCULAR; INTRAVENOUS at 10:41

## 2024-02-22 RX ADMIN — DILTIAZEM HYDROCHLORIDE 15 MG/HR: 5 INJECTION, SOLUTION INTRAVENOUS at 11:27

## 2024-02-22 RX ADMIN — LISINOPRIL 40 MG: 20 TABLET ORAL at 09:35

## 2024-02-22 RX ADMIN — CARVEDILOL 25 MG: 25 TABLET, FILM COATED ORAL at 09:35

## 2024-02-22 RX ADMIN — METOPROLOL TARTRATE 5 MG: 5 INJECTION INTRAVENOUS at 04:33

## 2024-02-22 RX ADMIN — DOXYCYCLINE 100 MG: 100 INJECTION, POWDER, LYOPHILIZED, FOR SOLUTION INTRAVENOUS at 14:21

## 2024-02-22 RX ADMIN — METOPROLOL TARTRATE 5 MG: 5 INJECTION INTRAVENOUS at 05:04

## 2024-02-22 RX ADMIN — METOPROLOL SUCCINATE 100 MG: 50 TABLET, EXTENDED RELEASE ORAL at 13:17

## 2024-02-22 RX ADMIN — DIGOXIN 250 MCG: 0.25 INJECTION INTRAMUSCULAR; INTRAVENOUS at 13:21

## 2024-02-22 RX ADMIN — DOXYCYCLINE 100 MG: 100 INJECTION, POWDER, LYOPHILIZED, FOR SOLUTION INTRAVENOUS at 22:47

## 2024-02-22 RX ADMIN — IPRATROPIUM BROMIDE AND ALBUTEROL SULFATE 1 DOSE: .5; 3 SOLUTION RESPIRATORY (INHALATION) at 14:20

## 2024-02-22 RX ADMIN — WATER 1000 MG: 1 INJECTION INTRAMUSCULAR; INTRAVENOUS; SUBCUTANEOUS at 23:45

## 2024-02-22 RX ADMIN — WATER 1000 MG: 1 INJECTION INTRAMUSCULAR; INTRAVENOUS; SUBCUTANEOUS at 00:12

## 2024-02-22 RX ADMIN — SODIUM CHLORIDE, PRESERVATIVE FREE 10 ML: 5 INJECTION INTRAVENOUS at 09:36

## 2024-02-22 RX ADMIN — HYDRALAZINE HYDROCHLORIDE 75 MG: 50 TABLET, FILM COATED ORAL at 21:17

## 2024-02-22 RX ADMIN — DILTIAZEM HYDROCHLORIDE 5 MG/HR: 5 INJECTION, SOLUTION INTRAVENOUS at 00:16

## 2024-02-22 RX ADMIN — HYDRALAZINE HYDROCHLORIDE 75 MG: 50 TABLET, FILM COATED ORAL at 02:31

## 2024-02-22 RX ADMIN — POTASSIUM CHLORIDE 40 MEQ: 1500 TABLET, EXTENDED RELEASE ORAL at 01:49

## 2024-02-22 RX ADMIN — ENOXAPARIN SODIUM 120 MG: 150 INJECTION SUBCUTANEOUS at 09:35

## 2024-02-22 RX ADMIN — IPRATROPIUM BROMIDE AND ALBUTEROL SULFATE 1 DOSE: .5; 3 SOLUTION RESPIRATORY (INHALATION) at 09:38

## 2024-02-22 RX ADMIN — AMIODARONE HYDROCHLORIDE 200 MG: 200 TABLET ORAL at 13:17

## 2024-02-22 NOTE — H&P
Department of Internal Medicine  History and Physical    PCP: Potocki, Joseph A, DO  Admitting Physician: Dr. See/Rio  Consultants:   Date of Service: 2/21/2024    CHIEF COMPLAINT:  sick    HISTORY OF PRESENT ILLNESS:    Patient is a 48-year-old female presented to the ED with sickness for the last week.  Patient states that she has been sick for the last several days.  She admits to nausea and emesis.  She has not been able to keep anything down without bringing it back up.  She also admits to diarrhea and also admits to dark melanotic appearing stool.  She admits to increased fatigue.  She admits to shortness of breath and productive cough.  She complains of subjective fever/chills.  Recently diagnosed with HOCM and there is plans for defibrillator placement.  She denies any chest pain.  She denies any lightheadedness or dizziness.    PAST MEDICAL Hx:  Past Medical History:   Diagnosis Date    Anemia     Arcuate uterus     Per Patient. Also known Hx Fibroids.    Arthritis     osteoarthritis - take ibuprophen    Chronic obstructive pulmonary disease ([facility]) 9/22/2023    Depression     Bouts in past, meds in past    Epilepsy ([facility])     As a child, was on depakote. 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 pylori infection 2018    per pt.    Hx of blood clots 2014    carotid artery blood clot was on coumadin approx 1 yr    Hx of cardiovascular stress test 10/07/2015    lexiscan    Hyperlipidemia     mildly elevated    Hypertension     Infertility, female     \"They told me i could never get pregnant because of the fibroids and my uterus\".    TIA (transient ischemic attack)     x2  last one 2014  no deficits         PAST SURGICAL Hx:   Past Surgical History:   Procedure Laterality Date    APPENDECTOMY      CHOLECYSTECTOMY      KNEE ARTHROSCOPY Left 09/15/2017    left knee arthroscopy with chondroplasty, synovectomy and lateral meniscectomy    TONSILLECTOMY         FAMILY Hx:  Family  History   Problem Relation Age of Onset    Diabetes Mother     Stroke Mother     Heart Disease Mother     Uterine Cancer Mother 40    Cancer Father     Heart Disease Father     Ovarian Cancer Maternal Grandmother 40    Breast Cancer Paternal Aunt 30       HOME MEDICATIONS:  Prior to Admission medications    Medication Sig Start Date End Date Taking? Authorizing Provider   hydrALAZINE (APRESOLINE) 25 MG tablet Take 3 tablets by mouth every 8 hours 12/22/23   Leia Gutierrez DO   lisinopril (PRINIVIL;ZESTRIL) 40 MG tablet Take 1 tablet by mouth daily 12/23/23   Leia Gutierrez DO   carvedilol (COREG) 25 MG tablet Take 1 tablet by mouth 2 times daily (with meals) 12/22/23   Leia Gutierrez DO   minoxidil (LONITEN) 2.5 MG tablet Take 1 tablet by mouth in the morning, at noon, and at bedtime 12/22/23   Leia Gutierrez DO   atorvastatin (LIPITOR) 40 MG tablet Take 1 tablet by mouth nightly 5/2/23   El No MD       ALLERGIES:  Corn oil, Amlodipine, Azithromycin, Codeine, Erythromycin, Fentanyl, Pcn [penicillins], and Sulfa antibiotics    SOCIAL Hx:  Social History     Socioeconomic History    Marital status:      Spouse name: Not on file    Number of children: 0    Years of education: 12    Highest education level: Not on file   Occupational History    Occupation: Not employed   Tobacco Use    Smoking status: Every Day     Current packs/day: 1.00     Average packs/day: 1 pack/day for 29.1 years (29.1 ttl pk-yrs)     Types: Cigarettes     Start date: 1995     Passive exposure: Never    Smokeless tobacco: Never   Vaping Use    Vaping Use: Never used   Substance and Sexual Activity    Alcohol use: No    Drug use: No    Sexual activity: Not Currently     Partners: Male   Other Topics Concern    Not on file   Social History Narrative    Not on file     Social Determinants of Health     Financial Resource Strain: Low Risk  (11/9/2023)    Overall Financial Resource Strain (CARDIA)

## 2024-02-22 NOTE — ED PROVIDER NOTES
abnormality is seen in the abdomen or the pelvis.   2. Ill-defined pulmonary infiltrate in the right middle lobe concerning for   pneumonia.   3. Mild cardiomegaly with mild interstitial edema.   4. Fibroid uterus.   5. Bilateral L4 spondylolysis with minimal grade 1 spondylolisthesis at L4   over L5.   6. Trace perihepatic ascites.                 ------------------------- NURSING NOTES AND VITALS REVIEWED ---------------------------  Date / Time Roomed:  2/21/2024  6:57 PM  ED Bed Assignment:  0518/0518-01    The nursing notes within the ED encounter and vital signs as below have been reviewed.   Patient Vitals for the past 24 hrs:   BP Temp Temp src Pulse Resp SpO2 Height Weight   02/22/24 0154 -- -- -- (!) 153 -- -- -- --   02/22/24 0115 (!) 155/137 98.1 °F (36.7 °C) Infrared (!) 140 21 93 % -- --   02/22/24 0022 -- -- -- (!) 108 25 -- -- --   02/22/24 0019 (!) 143/116 -- -- (!) 156 28 -- -- --   02/21/24 2344 (!) 186/162 -- -- (!) 145 26 93 % -- --   02/21/24 2250 (!) 129/94 -- -- (!) 117 25 92 % -- --   02/21/24 2230 (!) 124/105 -- -- (!) 124 23 91 % -- --   02/21/24 2228 (!) 168/102 -- -- (!) 149 25 93 % -- --   02/21/24 2223 (!) 156/124 -- -- (!) 152 29 92 % -- --   02/21/24 2220 (!) 158/135 -- -- (!) 140 21 94 % -- --   02/21/24 2210 -- -- -- (!) 128 22 94 % -- --   02/21/24 2200 -- -- -- (!) 129 24 94 % -- --   02/21/24 2150 (!) 129/94 -- -- (!) 145 15 94 % -- --   02/21/24 2145 -- -- -- (!) 146 19 92 % -- --   02/21/24 2140 -- -- -- (!) 140 14 91 % -- --   02/21/24 2137 (!) 119/91 -- -- (!) 136 14 94 % -- --   02/21/24 2135 -- -- -- (!) 161 27 (!) 84 % -- --   02/21/24 2130 (!) 137/100 -- -- (!) 104 24 (!) 86 % -- --   02/21/24 1902 (!) 178/128 97.7 °F (36.5 °C) Oral (!) 102 20 90 % 1.702 m (5' 7\") 115.7 kg (255 lb)       Oxygen Saturation Interpretation: Normal      ------------------------------------------ PROGRESS NOTES ------------------------------------------  Re-evaluation(s):    Patient’s  symptoms are improving  Repeat physical examination is improved     Patient’s symptoms are improving  Repeat physical examination is improved    Called to the room because patient's heart rate was elevated.  Concern for rapid irregular heart rhythm.  Consistent with atrial fibrillation with rapid ventricular response.    I have spoken with the patient and discussed today’s results, in addition to providing specific details for the plan of care and counseling regarding the diagnosis and prognosis.  Their questions are answered at this time and they are agreeable with the plan.      --------------------------------- ADDITIONAL PROVIDER NOTES ---------------------------------  Consultations:  Spoke with Dr. Palafox,  They will admit this patient.    This patient's ED course included: a personal history and physicial examination, re-evaluation prior to disposition, multiple bedside re-evaluations, IV medications, cardiac monitoring, continuous pulse oximetry, and complex medical decision making and emergency management    This patient has remained hemodynamically stable and been closely monitored during their ED course.    Please note that the withdrawal or failure to initiate urgent interventions for this patient would likely result in a life threatening deterioration or permanent disability.      Accordingly this patient received 50 minutes of critical care time, excluding separately billable procedures. Systems at risk for deterioration include: neurologic, cardiovascular.      Clinical Impression  1. Atrial fibrillation with RVR (HCC)    2. NSTEMI (non-ST elevated myocardial infarction) ([facility])    3. Acute respiratory failure with hypoxia ([facility])    4. Pneumonia of both lower lobes due to infectious organism          Disposition  Patient's disposition: Admit to telemetry  Patient's condition is stable.       Brando Higginbotham DO  02/22/24 0211

## 2024-02-22 NOTE — ED NOTES
Patient having multiple episodes of emesis at this time. Dr. Higginbotham made aware. Orders to follow.

## 2024-02-22 NOTE — ED NOTES
Went into pt room due to poor waveforms, radial pulse felt and irregular, pt placed on tele and tachy around 160 bpm. Dr. Higginbotham made aware and EKG completed showing a fib RVR. Pulse ox moved to ear with better wave forms and 87%, pt placed on 2L NC and O2 improved to 95%. Provider in the room at bedside to discuss plan of care with pt and family.

## 2024-02-22 NOTE — RT PROTOCOL NOTE
RT Nebulizer Bronchodilator Protocol Note    There is a bronchodilator order in the chart from a provider indicating to follow the RT Bronchodilator Protocol and there is an “Initiate RT Bronchodilator Protocol” order as well (see protocol at bottom of note).    CXR Findings:  No results found.    The findings from the last RT Protocol Assessment were as follows:  Smoking: Chronic pulmonary disease  Respiratory Pattern: Regular pattern and RR 12-20 bpm  Breath Sounds: Slightly diminished and/or crackles  Cough: Strong, spontaneous, non-productive  Indication for Bronchodilator Therapy: Decreased or absent breath sounds  Bronchodilator Assessment Score: 4    Aerosolized bronchodilator medication orders have been revised according to the RT Nebulizer Bronchodilator Protocol below.    Respiratory Therapist to perform RT Therapy Protocol Assessment initially then follow the protocol.  Repeat RT Therapy Protocol Assessment PRN for score 0-3 or on second treatment, BID, and PRN for scores above 3.    No Indications - adjust the frequency to every 6 hours PRN wheezing or bronchospasm, if no treatments needed after 48 hours then discontinue using Per Protocol order mode.     If indication present, adjust the RT bronchodilator orders based on the Bronchodilator Assessment Score as indicated below.  If a patient is on this medication at home then do not decrease Frequency below that used at home.    0-3 - enter or revise RT bronchodilator order(s) to equivalent RT Bronchodilator order with Frequency of every 4 hours PRN for wheezing or increased work of breathing using Per Protocol order mode.       4-6 - enter or revise RT Bronchodilator order(s) to two equivalent RT bronchodilator orders with one order with BID Frequency and one order with Frequency of every 4 hours PRN wheezing or increased work of breathing using Per Protocol order mode.         7-10 - enter or revise RT Bronchodilator order(s) to two equivalent RT

## 2024-02-22 NOTE — PLAN OF CARE
Problem: Cardiovascular - Adult  Goal: Absence of cardiac dysrhythmias or at baseline  Outcome: Progressing     Problem: Skin/Tissue Integrity - Adult  Goal: Skin integrity remains intact  Outcome: Progressing     Problem: Gastrointestinal - Adult  Goal: Minimal or absence of nausea and vomiting  Outcome: Progressing     Problem: Gastrointestinal - Adult  Goal: Maintains or returns to baseline bowel function  Outcome: Progressing     Problem: Metabolic/Fluid and Electrolytes - Adult  Goal: Electrolytes maintained within normal limits  Outcome: Progressing

## 2024-02-22 NOTE — CONSULTS
INPATIENT CARDIOLOGY CONSULT     Reason for Consult: AF RVR    Cardiologist: Dr. Rojas    Requesting Physician: Dr. See    Date of Consultation: 2/22/2024    HISTORY OF PRESENT ILLNESS:   Patient is a 48 year old WF known to Dr. Ndiaye through inpatient consultation November 2023.  She now follows with Cardiology at , Dr. Aguirre and Dr. Kuo.  She is currently being evaluated at  for possible HOCM (last OV 1/11/2024) --> per patient, she admits to diagnosis of HOCM with current LifeVest and plans for eventual ICD placement.    Patient has a known past medical history of chronically elevated troponin, nonobstructive CAD on cardiac cath  8/2023, NSVT/PVCs, severe LVH, poorly controlled HTN, Norvasc intolerance, HLD, T2DM, COPD with continued tobacco abuse, obesity, probable LUCA, reported hypercoagulable disorder (factor VIII deficiency versus antiphospholipid antibody syndrome), questionable TIA, arthritis, depression, chronic anemia with history of requiring transfusion in the past per patient, uterine fibroids with high-grade cervical dysplasia, medical non-compliance.    ED visit [facility] 12/29/2023 for slurred speech and strokelike symptoms.  Noted of chest pain.  Wearing LifeVest.  No focal neurological deficits noted on exam.  Admission recommended, patient eloped.  ED visit [facility] 12/30/2023 for slurred speech and strokelike symptoms with associated chest pain.  Admission recommended, patient again left AMA.    Patient presented to [facility] on February 21, 2024 with complaints of N/V, diarrhea.  She reports C. difficile exposure approximately one week ago, and eventually began to notice nausea, emesis, diarrhea and myalgias.  Admits to poor appetite, generalized fatigue, shortness of breath, productive cough and subjective fever/chills.  No note of chest discomfort, hematemesis, palpitations, dizziness, near-syncope or syncope, PND, orthopnea or peripheral edema.  Additionally admitted to  Javy, Ismail U, DO    sodium chloride flush 0.9 % injection 5-40 mL, 5-40 mL, IntraVENous, 2 times per day, Javy, Ismail U, DO    sodium chloride flush 0.9 % injection 5-40 mL, 5-40 mL, IntraVENous, PRN, Javy, Ismail U, DO    0.9 % sodium chloride infusion, , IntraVENous, PRN, Javy, Ismail U, DO    potassium chloride (KLOR-CON M) extended release tablet 40 mEq, 40 mEq, Oral, PRN, 40 mEq at 02/22/24 0149 **OR** potassium bicarb-citric acid (EFFER-K) effervescent tablet 40 mEq, 40 mEq, Oral, PRN **OR** potassium chloride 10 mEq/100 mL IVPB (Peripheral Line), 10 mEq, IntraVENous, PRN, Javy, Ismail U, DO    magnesium sulfate 2000 mg in 50 mL IVPB premix, 2,000 mg, IntraVENous, PRN, Javy, Ismail U, DO    polyethylene glycol (GLYCOLAX) packet 17 g, 17 g, Oral, Daily PRN, Javy, Ismail U, DO    acetaminophen (TYLENOL) tablet 650 mg, 650 mg, Oral, Q6H PRN **OR** acetaminophen (TYLENOL) suppository 650 mg, 650 mg, Rectal, Q6H PRN, Javy, Ismail U, DO    ipratropium 0.5 mg-albuterol 2.5 mg (DUONEB) nebulizer solution 1 Dose, 1 Dose, Inhalation, Q4H RT, Javy, Ismail U, DO    carvedilol (COREG) tablet 25 mg, 25 mg, Oral, BID WC, Ajvy, Ismail U, DO    hydrALAZINE (APRESOLINE) tablet 75 mg, 75 mg, Oral, 3 times per day, Javy, Ismail U, DO, 75 mg at 02/22/24 0231    lisinopril (PRINIVIL;ZESTRIL) tablet 40 mg, 40 mg, Oral, Daily, Javy, Ismail U, DO    [COMPLETED] dilTIAZem injection 10 mg, 10 mg, IntraVENous, Once, 10 mg at 02/22/24 0013 **FOLLOWED BY** dilTIAZem 125 mg in sodium chloride 0.9 % 125 mL infusion, 2.5-15 mg/hr, IntraVENous, Continuous, Brando Higginbotham DO, Last Rate: 15 mL/hr at 02/22/24 0306, 15 mg/hr at 02/22/24 0306    cefTRIAXone (ROCEPHIN) 1,000 mg in sterile water 10 mL IV syringe, 1,000 mg, IntraVENous, Q24H, Consuelo Palafox DO, 1,000 mg at 02/22/24 0012    ALLERGIES:  Corn oil, Amlodipine, Azithromycin, Codeine, Erythromycin, Fentanyl, Pcn [penicillins], and Sulfa antibiotics    SOCIAL

## 2024-02-22 NOTE — PROGRESS NOTES
4 Eyes Skin Assessment     NAME:  Sheila Alanis  YOB: 1975  MEDICAL RECORD NUMBER:  [id number]    The patient is being assessed for  Admission    I agree that at least one RN has performed a thorough Head to Toe Skin Assessment on the patient. ALL assessment sites listed below have been assessed.      Areas assessed by both nurses:    Head, Face, Ears, Shoulders, Back, Chest, Arms, Elbows, Hands, Sacrum. Buttock, Coccyx, Ischium, and Legs. Feet and Heels        Does the Patient have a Wound? No noted wound(s)       Jalen Prevention initiated by RN: No  Wound Care Orders initiated by RN: No    Pressure Injury (Stage 3,4, Unstageable, DTI, NWPT, and Complex wounds) if present, place Wound referral order by RN under : No    New Ostomies, if present place, Ostomy referral order under : No     Nurse 1 eSignature: Electronically signed by Ruth Serrano RN on 2/22/24 at 6:57 AM EST    **SHARE this note so that the co-signing nurse can place an eSignature**    Nurse 2 eSignature: Electronically signed by Olga Lidia Bethea RN on 2/22/24 at 7:01 AM EST

## 2024-02-23 ENCOUNTER — APPOINTMENT (OUTPATIENT)
Dept: ULTRASOUND IMAGING | Age: 49
DRG: 720 | End: 2024-02-23
Payer: COMMERCIAL

## 2024-02-23 LAB
ALBUMIN SERPL-MCNC: 3.3 G/DL (ref 3.5–5.2)
ALP SERPL-CCNC: 111 U/L (ref 35–104)
ALT SERPL-CCNC: 119 U/L (ref 0–32)
ANION GAP SERPL CALCULATED.3IONS-SCNC: 9 MMOL/L (ref 7–16)
AST SERPL-CCNC: 32 U/L (ref 0–31)
BASOPHILS # BLD: 0.03 K/UL (ref 0–0.2)
BASOPHILS NFR BLD: 0 % (ref 0–2)
BILIRUB SERPL-MCNC: 0.4 MG/DL (ref 0–1.2)
BUN SERPL-MCNC: 12 MG/DL (ref 6–20)
CALCIUM SERPL-MCNC: 7.7 MG/DL (ref 8.6–10.2)
CHLORIDE SERPL-SCNC: 100 MMOL/L (ref 98–107)
CO2 SERPL-SCNC: 25 MMOL/L (ref 22–29)
CREAT SERPL-MCNC: 0.6 MG/DL (ref 0.5–1)
EKG ATRIAL RATE: 197 BPM
EKG Q-T INTERVAL: 378 MS
EKG QRS DURATION: 112 MS
EKG QTC CALCULATION (BAZETT): 472 MS
EKG R AXIS: 27 DEGREES
EKG T AXIS: -168 DEGREES
EKG VENTRICULAR RATE: 94 BPM
EOSINOPHIL # BLD: 0.11 K/UL (ref 0.05–0.5)
EOSINOPHILS RELATIVE PERCENT: 1 % (ref 0–6)
ERYTHROCYTE [DISTWIDTH] IN BLOOD BY AUTOMATED COUNT: 18.1 % (ref 11.5–15)
GFR SERPL CREATININE-BSD FRML MDRD: >60 ML/MIN/1.73M2
GLUCOSE SERPL-MCNC: 142 MG/DL (ref 74–99)
HCT VFR BLD AUTO: 33.4 % (ref 34–48)
HGB BLD-MCNC: 9.9 G/DL (ref 11.5–15.5)
IMM GRANULOCYTES # BLD AUTO: 0.18 K/UL (ref 0–0.58)
IMM GRANULOCYTES NFR BLD: 2 % (ref 0–5)
LYMPHOCYTES NFR BLD: 2.58 K/UL (ref 1.5–4)
LYMPHOCYTES RELATIVE PERCENT: 24 % (ref 20–42)
MCH RBC QN AUTO: 23 PG (ref 26–35)
MCHC RBC AUTO-ENTMCNC: 29.6 G/DL (ref 32–34.5)
MCV RBC AUTO: 77.7 FL (ref 80–99.9)
MONOCYTES NFR BLD: 0.8 K/UL (ref 0.1–0.95)
MONOCYTES NFR BLD: 8 % (ref 2–12)
NEUTROPHILS NFR BLD: 65 % (ref 43–80)
NEUTS SEG NFR BLD: 6.95 K/UL (ref 1.8–7.3)
PLATELET # BLD AUTO: 311 K/UL (ref 130–450)
PMV BLD AUTO: 10.7 FL (ref 7–12)
POTASSIUM SERPL-SCNC: 3.4 MMOL/L (ref 3.5–5)
PROT SERPL-MCNC: 5.8 G/DL (ref 6.4–8.3)
RBC # BLD AUTO: 4.3 M/UL (ref 3.5–5.5)
SODIUM SERPL-SCNC: 134 MMOL/L (ref 132–146)
WBC OTHER # BLD: 10.7 K/UL (ref 4.5–11.5)

## 2024-02-23 PROCEDURE — 97530 THERAPEUTIC ACTIVITIES: CPT | Performed by: OCCUPATIONAL THERAPIST

## 2024-02-23 PROCEDURE — 94640 AIRWAY INHALATION TREATMENT: CPT

## 2024-02-23 PROCEDURE — 6360000002 HC RX W HCPCS: Performed by: INTERNAL MEDICINE

## 2024-02-23 PROCEDURE — 99233 SBSQ HOSP IP/OBS HIGH 50: CPT | Performed by: INTERNAL MEDICINE

## 2024-02-23 PROCEDURE — 2580000003 HC RX 258: Performed by: INTERNAL MEDICINE

## 2024-02-23 PROCEDURE — 6370000000 HC RX 637 (ALT 250 FOR IP): Performed by: INTERNAL MEDICINE

## 2024-02-23 PROCEDURE — 2700000000 HC OXYGEN THERAPY PER DAY

## 2024-02-23 PROCEDURE — 85025 COMPLETE CBC W/AUTO DIFF WBC: CPT

## 2024-02-23 PROCEDURE — 36415 COLL VENOUS BLD VENIPUNCTURE: CPT

## 2024-02-23 PROCEDURE — 2060000000 HC ICU INTERMEDIATE R&B

## 2024-02-23 PROCEDURE — 6370000000 HC RX 637 (ALT 250 FOR IP)

## 2024-02-23 PROCEDURE — 80053 COMPREHEN METABOLIC PANEL: CPT

## 2024-02-23 PROCEDURE — 2500000003 HC RX 250 WO HCPCS: Performed by: INTERNAL MEDICINE

## 2024-02-23 PROCEDURE — 93010 ELECTROCARDIOGRAM REPORT: CPT | Performed by: INTERNAL MEDICINE

## 2024-02-23 PROCEDURE — 93971 EXTREMITY STUDY: CPT

## 2024-02-23 PROCEDURE — 97165 OT EVAL LOW COMPLEX 30 MIN: CPT | Performed by: OCCUPATIONAL THERAPIST

## 2024-02-23 RX ADMIN — IPRATROPIUM BROMIDE AND ALBUTEROL SULFATE 1 DOSE: .5; 2.5 SOLUTION RESPIRATORY (INHALATION) at 13:24

## 2024-02-23 RX ADMIN — DILTIAZEM HYDROCHLORIDE 7.5 MG/HR: 5 INJECTION, SOLUTION INTRAVENOUS at 15:30

## 2024-02-23 RX ADMIN — LISINOPRIL 20 MG: 20 TABLET ORAL at 11:42

## 2024-02-23 RX ADMIN — WATER 1000 MG: 1 INJECTION INTRAMUSCULAR; INTRAVENOUS; SUBCUTANEOUS at 23:16

## 2024-02-23 RX ADMIN — HYDRALAZINE HYDROCHLORIDE 75 MG: 50 TABLET, FILM COATED ORAL at 20:14

## 2024-02-23 RX ADMIN — SODIUM CHLORIDE, PRESERVATIVE FREE 10 ML: 5 INJECTION INTRAVENOUS at 20:17

## 2024-02-23 RX ADMIN — HYDRALAZINE HYDROCHLORIDE 75 MG: 50 TABLET, FILM COATED ORAL at 06:16

## 2024-02-23 RX ADMIN — ENOXAPARIN SODIUM 120 MG: 150 INJECTION SUBCUTANEOUS at 09:51

## 2024-02-23 RX ADMIN — AMIODARONE HYDROCHLORIDE 200 MG: 200 TABLET ORAL at 11:42

## 2024-02-23 RX ADMIN — DOXYCYCLINE 100 MG: 100 INJECTION, POWDER, LYOPHILIZED, FOR SOLUTION INTRAVENOUS at 21:52

## 2024-02-23 RX ADMIN — AMIODARONE HYDROCHLORIDE 200 MG: 200 TABLET ORAL at 20:14

## 2024-02-23 RX ADMIN — APIXABAN 5 MG: 5 TABLET, FILM COATED ORAL at 20:14

## 2024-02-23 RX ADMIN — DOXYCYCLINE 100 MG: 100 INJECTION, POWDER, LYOPHILIZED, FOR SOLUTION INTRAVENOUS at 12:12

## 2024-02-23 RX ADMIN — METOPROLOL SUCCINATE 100 MG: 50 TABLET, EXTENDED RELEASE ORAL at 11:41

## 2024-02-23 RX ADMIN — POTASSIUM CHLORIDE 40 MEQ: 1500 TABLET, EXTENDED RELEASE ORAL at 15:31

## 2024-02-23 RX ADMIN — HYDRALAZINE HYDROCHLORIDE 75 MG: 50 TABLET, FILM COATED ORAL at 13:44

## 2024-02-23 ASSESSMENT — PAIN DESCRIPTION - LOCATION: LOCATION: GENERALIZED

## 2024-02-23 ASSESSMENT — PAIN SCALES - GENERAL: PAINLEVEL_OUTOF10: 3

## 2024-02-23 NOTE — PROGRESS NOTES
Breathing treatments, mucolytic's in the setting of COPD exacerbation.  Chronic comorbidities, lab values and vital signs are being monitored and addressed accordingly.  PT, OT and social work for discharge planning. Continue current therapy.  See orders for further plan of care.    Sheila requires this high level of physician care and nursing on the IMC/Telemetry unit due the complexity of decision management and chance of rapid decline or death.  Continued cardiac monitoring and higher level of nursing are required. I am readily available for any further decision-making and intervention.     More than 50% of my  time was spent at the bedside counseling/coordinating care with the patient and/or family with face to face contact.  This time was spent reviewing notes and laboratory data as well as instructing and counseling the patient. Time I spent with the family or surrogate(s) is included only if the patient was incapable of providing the necessary information or participating in medical decisions. I also discussed the differential diagnosis and all of the proposed management plans with the patient and individuals accompanying the patient.      REGULO Florian - CNP  2/23/2024  12:16 PM

## 2024-02-23 NOTE — PROGRESS NOTES
Pt found with her oxygen off. Pulse ox 87% on RA. NC re-applied and pulse ox came up to 92% on 4L. Then attempted to get pt to open her eyes to take her meds and pt shook her head no. Pt was educated on importance of taking her medications and she still refused. She did let me give her the lovenox injection, but was hesitant to let me give that to her at first.

## 2024-02-23 NOTE — PROGRESS NOTES
Evaluation Time additionally includes thorough review of current medical information, gathering information on past medical history/social history and prior level of function, interpretation of standardized testing/informal observation of tasks, assessment of data and development of plan of care and goals.        Victoria Medrano OTR/L; [id number]

## 2024-02-23 NOTE — PROGRESS NOTES
[facility]        CARDIOLOGY                 INPATIENT PROGRESS NOTE          PATIENT SEEN IN FOLLOW UP FOR: Atrial Flutter    Hospital Day: 3     Sheila Alanis is a 48 year old patient known to Dr. Kuo and follows with Cardiologist, Dr. Aguirre  at , Dr. Aguirre.         SUBJECTIVE: Denies any CP. Breathing better.  No orthopnea. No palpitations No distress.     ROS: Review of rest of 10 systems negative except as mentioned above    OBJECTIVE: No acute distress.  See Assessment     Diagnostics:       Telemetry: Reviewed    Echo  Limited 2/22/2024    Left Ventricle: Normal left ventricular systolic function with EF of 60 - 65%. Severe concentric hypertrophy.    Left Atrium: Left atrium is mildly dilated. Left atrial volume index is severely increased (>48 mL/m2). No thrombus.    No intracardiac thormbus.    Limited study to r/o thrombus    Prior study done 5/2023    No intake or output data in the 24 hours ending 02/23/24 1123    Labs:   CBC:   Recent Labs     02/22/24  0458 02/22/24  1121 02/23/24  1010   WBC 15.9*  --  10.7   HGB 10.5* 10.7* 9.9*   HCT 35.1 36.2 33.4*     --  311     BMP:   Recent Labs     02/22/24  0458 02/23/24  1010    134   K 3.7 3.4*   CO2 23 25   BUN 16 12   CREATININE 0.7 0.6   LABGLOM >60 >60   CALCIUM 7.8* 7.7*     Mag:   Recent Labs     02/21/24  1945 02/22/24  0458   MG 1.9 1.8     Phos:   Recent Labs     02/22/24  0458   PHOS 1.6*     TSH:   Recent Labs     02/22/24  0458   TSH 1.15     HgA1c:     BNP: No results for input(s): \"BNP\" in the last 72 hours.  PT/INR:   Recent Labs     02/21/24  2330   PROTIME 17.1*   INR 1.5     APTT:No results for input(s): \"APTT\" in the last 72 hours.  CARDIAC ENZYMES:  Recent Labs     02/21/24  2144 02/21/24  2235   TROPHS 107* 112*     FASTING LIPID PANEL:  Lab Results   Component Value Date/Time    CHOL 123 06/12/2023 11:55 AM    HDL 46 06/12/2023 11:55 AM    LDLCALC 63 06/12/2023 11:55 AM

## 2024-02-23 NOTE — CARE COORDINATION
Case Management Assessment  Initial Evaluation    Date/Time of Evaluation: 2/23/2024 2:57 PM  Assessment Completed by: SILKE Nolasco    If patient is discharged prior to next notation, then this note serves as note for discharge by case management.    Patient Name: Sheila Alanis                   YOB: 1975  Diagnosis: NSTEMI (non-ST elevated myocardial infarction) ([facility]) [I21.4]  Atrial flutter with rapid ventricular response (HCC) [I48.92]  Atrial fibrillation with RVR (HCC) [I48.91]  Acute respiratory failure with hypoxia (HCC) [J96.01]  Pneumonia of both lower lobes due to infectious organism [J18.9]                   Date / Time: 2/21/2024  6:57 PM    Patient Admission Status: Inpatient   Readmission Risk (Low < 19, Mod (19-27), High > 27): Readmission Risk Score: 21.8    Current PCP: Potocki, Joseph A, DO  PCP verified by CM? Yes    Chart Reviewed: Yes      History Provided by: Patient  Patient Orientation: Alert and Oriented    Patient Cognition: Alert    Hospitalization in the last 30 days (Readmission):  No    If yes, Readmission Assessment in CM Navigator will be completed.    Advance Directives:      Code Status: Full Code   Patient's Primary Decision Maker is: Legal Next of Kin      Discharge Planning:    Patient lives with: Spouse/Significant Other Type of Home: Apartment  Primary Care Giver: Self  Patient Support Systems include: Spouse/Significant Other, Family Members   Current Financial resources: Medicaid  Current community resources: None  Current services prior to admission: None            Current DME:              Type of Home Care services:  None    ADLS  Prior functional level: Independent in ADLs/IADLs  Current functional level: Independent in ADLs/IADLs    PT AM-PAC:   /24  OT AM-PAC: 13 /24    Family can provide assistance at DC: No  Would you like Case Management to discuss the discharge plan with any other family members/significant others, and if so,

## 2024-02-24 VITALS
HEART RATE: 98 BPM | HEIGHT: 67 IN | BODY MASS INDEX: 40.02 KG/M2 | RESPIRATION RATE: 14 BRPM | SYSTOLIC BLOOD PRESSURE: 147 MMHG | OXYGEN SATURATION: 89 % | TEMPERATURE: 98.4 F | DIASTOLIC BLOOD PRESSURE: 93 MMHG | WEIGHT: 255 LBS

## 2024-02-24 LAB
ALBUMIN SERPL-MCNC: 3.3 G/DL (ref 3.5–5.2)
ALP SERPL-CCNC: 109 U/L (ref 35–104)
ALT SERPL-CCNC: 92 U/L (ref 0–32)
ANION GAP SERPL CALCULATED.3IONS-SCNC: 10 MMOL/L (ref 7–16)
AST SERPL-CCNC: 22 U/L (ref 0–31)
BASOPHILS # BLD: 0.04 K/UL (ref 0–0.2)
BASOPHILS NFR BLD: 0 % (ref 0–2)
BILIRUB SERPL-MCNC: 0.4 MG/DL (ref 0–1.2)
BUN SERPL-MCNC: 9 MG/DL (ref 6–20)
CALCIUM SERPL-MCNC: 7.9 MG/DL (ref 8.6–10.2)
CHLORIDE SERPL-SCNC: 102 MMOL/L (ref 98–107)
CO2 SERPL-SCNC: 25 MMOL/L (ref 22–29)
CREAT SERPL-MCNC: 0.6 MG/DL (ref 0.5–1)
EOSINOPHIL # BLD: 0.13 K/UL (ref 0.05–0.5)
EOSINOPHILS RELATIVE PERCENT: 1 % (ref 0–6)
ERYTHROCYTE [DISTWIDTH] IN BLOOD BY AUTOMATED COUNT: 17.7 % (ref 11.5–15)
GFR SERPL CREATININE-BSD FRML MDRD: >60 ML/MIN/1.73M2
GLUCOSE SERPL-MCNC: 125 MG/DL (ref 74–99)
HCT VFR BLD AUTO: 33.3 % (ref 34–48)
HGB BLD-MCNC: 10.1 G/DL (ref 11.5–15.5)
IMM GRANULOCYTES # BLD AUTO: 0.17 K/UL (ref 0–0.58)
IMM GRANULOCYTES NFR BLD: 2 % (ref 0–5)
LYMPHOCYTES NFR BLD: 2.54 K/UL (ref 1.5–4)
LYMPHOCYTES RELATIVE PERCENT: 27 % (ref 20–42)
MAGNESIUM SERPL-MCNC: 1.8 MG/DL (ref 1.6–2.6)
MCH RBC QN AUTO: 23.4 PG (ref 26–35)
MCHC RBC AUTO-ENTMCNC: 30.3 G/DL (ref 32–34.5)
MCV RBC AUTO: 77.1 FL (ref 80–99.9)
MONOCYTES NFR BLD: 0.71 K/UL (ref 0.1–0.95)
MONOCYTES NFR BLD: 8 % (ref 2–12)
NEUTROPHILS NFR BLD: 62 % (ref 43–80)
NEUTS SEG NFR BLD: 5.82 K/UL (ref 1.8–7.3)
PHOSPHATE SERPL-MCNC: 1.5 MG/DL (ref 2.5–4.5)
PLATELET # BLD AUTO: 342 K/UL (ref 130–450)
PMV BLD AUTO: 10.6 FL (ref 7–12)
POTASSIUM SERPL-SCNC: 3.4 MMOL/L (ref 3.5–5)
PROT SERPL-MCNC: 6.1 G/DL (ref 6.4–8.3)
RBC # BLD AUTO: 4.32 M/UL (ref 3.5–5.5)
SODIUM SERPL-SCNC: 137 MMOL/L (ref 132–146)
TROPONIN I SERPL HS-MCNC: 84 NG/L (ref 0–9)
WBC OTHER # BLD: 9.4 K/UL (ref 4.5–11.5)

## 2024-02-24 PROCEDURE — 2580000003 HC RX 258: Performed by: INTERNAL MEDICINE

## 2024-02-24 PROCEDURE — 94640 AIRWAY INHALATION TREATMENT: CPT

## 2024-02-24 PROCEDURE — 6370000000 HC RX 637 (ALT 250 FOR IP): Performed by: INTERNAL MEDICINE

## 2024-02-24 PROCEDURE — 80053 COMPREHEN METABOLIC PANEL: CPT

## 2024-02-24 PROCEDURE — 97161 PT EVAL LOW COMPLEX 20 MIN: CPT

## 2024-02-24 PROCEDURE — 84100 ASSAY OF PHOSPHORUS: CPT

## 2024-02-24 PROCEDURE — 6370000000 HC RX 637 (ALT 250 FOR IP)

## 2024-02-24 PROCEDURE — 6360000002 HC RX W HCPCS: Performed by: INTERNAL MEDICINE

## 2024-02-24 PROCEDURE — 85025 COMPLETE CBC W/AUTO DIFF WBC: CPT

## 2024-02-24 PROCEDURE — 99233 SBSQ HOSP IP/OBS HIGH 50: CPT | Performed by: INTERNAL MEDICINE

## 2024-02-24 PROCEDURE — 2500000003 HC RX 250 WO HCPCS: Performed by: INTERNAL MEDICINE

## 2024-02-24 PROCEDURE — 84484 ASSAY OF TROPONIN QUANT: CPT

## 2024-02-24 PROCEDURE — 83735 ASSAY OF MAGNESIUM: CPT

## 2024-02-24 PROCEDURE — 2700000000 HC OXYGEN THERAPY PER DAY

## 2024-02-24 PROCEDURE — 36415 COLL VENOUS BLD VENIPUNCTURE: CPT

## 2024-02-24 RX ORDER — DOXYCYCLINE HYCLATE 100 MG/1
100 CAPSULE ORAL EVERY 12 HOURS SCHEDULED
Status: DISCONTINUED | OUTPATIENT
Start: 2024-02-24 | End: 2024-02-25 | Stop reason: HOSPADM

## 2024-02-24 RX ORDER — HYDRALAZINE HYDROCHLORIDE 20 MG/ML
10 INJECTION INTRAMUSCULAR; INTRAVENOUS ONCE
Status: COMPLETED | OUTPATIENT
Start: 2024-02-24 | End: 2024-02-24

## 2024-02-24 RX ORDER — METOPROLOL SUCCINATE 50 MG/1
100 TABLET, EXTENDED RELEASE ORAL 2 TIMES DAILY
Status: DISCONTINUED | OUTPATIENT
Start: 2024-02-24 | End: 2024-02-25 | Stop reason: HOSPADM

## 2024-02-24 RX ADMIN — APIXABAN 5 MG: 5 TABLET, FILM COATED ORAL at 21:10

## 2024-02-24 RX ADMIN — IPRATROPIUM BROMIDE AND ALBUTEROL SULFATE 1 DOSE: .5; 2.5 SOLUTION RESPIRATORY (INHALATION) at 16:30

## 2024-02-24 RX ADMIN — SODIUM CHLORIDE, PRESERVATIVE FREE 10 ML: 5 INJECTION INTRAVENOUS at 09:08

## 2024-02-24 RX ADMIN — AMIODARONE HYDROCHLORIDE 200 MG: 200 TABLET ORAL at 09:05

## 2024-02-24 RX ADMIN — IPRATROPIUM BROMIDE AND ALBUTEROL SULFATE 1 DOSE: .5; 2.5 SOLUTION RESPIRATORY (INHALATION) at 04:55

## 2024-02-24 RX ADMIN — APIXABAN 5 MG: 5 TABLET, FILM COATED ORAL at 09:05

## 2024-02-24 RX ADMIN — HYDRALAZINE HYDROCHLORIDE 75 MG: 50 TABLET, FILM COATED ORAL at 21:11

## 2024-02-24 RX ADMIN — METOPROLOL SUCCINATE 100 MG: 50 TABLET, EXTENDED RELEASE ORAL at 21:11

## 2024-02-24 RX ADMIN — DILTIAZEM HYDROCHLORIDE 7.5 MG/HR: 5 INJECTION, SOLUTION INTRAVENOUS at 11:07

## 2024-02-24 RX ADMIN — HYDRALAZINE HYDROCHLORIDE 75 MG: 50 TABLET, FILM COATED ORAL at 13:55

## 2024-02-24 RX ADMIN — IPRATROPIUM BROMIDE AND ALBUTEROL SULFATE 1 DOSE: .5; 2.5 SOLUTION RESPIRATORY (INHALATION) at 12:49

## 2024-02-24 RX ADMIN — HYDRALAZINE HYDROCHLORIDE 10 MG: 20 INJECTION, SOLUTION INTRAMUSCULAR; INTRAVENOUS at 18:01

## 2024-02-24 RX ADMIN — LISINOPRIL 20 MG: 20 TABLET ORAL at 09:04

## 2024-02-24 RX ADMIN — METOPROLOL SUCCINATE 100 MG: 50 TABLET, EXTENDED RELEASE ORAL at 09:04

## 2024-02-24 NOTE — PROGRESS NOTES
Went to hourly round on pt & found IV cardizem gtt disconnected from pt IV adaptor.  Pt stated she disconnected it so she could go to the bathroom.  RN educated pt numerous times today about the importance of the cardizem gtt & the importance of not disconnecting the gtt.  Cardiologist & charge nurse were notified by RN today about pt constantly disconnecting her cardizem gtt

## 2024-02-24 NOTE — PROGRESS NOTES
Internal Medicine Progress Note    [facility]    Darrion See D.O., ELLEI.                    Cuong Pate D.O., MINOO Mayfield D.O.       Flakita Cardona, MSN, APRN, NP-C  Surjit Aldridge, MSN, APRN-CNP  Rohith Murillo, MSN, APRN-CNP     Primary Care Physician: Potocki, Joseph A, DO   Admitting Physician:  Darrion See DO  Admission date and time: 2/21/2024  6:57 PM    Room:  [address] [place] [address]  Admitting diagnosis: NSTEMI (non-ST elevated myocardial infarction) (HCC) [I21.4]  Atrial flutter with rapid ventricular response (HCC) [I48.92]  Atrial fibrillation with RVR (HCC) [I48.91]  Acute respiratory failure with hypoxia (HCC) [J96.01]  Pneumonia of both lower lobes due to infectious organism [J18.9]    Patient Name: Sheila Alanis  MRN: [id number]    Date of Service: 2/24/2024     Subjective:  Sheila is a 48 y.o. female who was seen and examined today,2/24/2024, at the bedside.  Xenias condition is largely unchanged due in part to her limited participation with recommendations.  She essentially sleeps throughout the day.  Her heart rate went to 140 with persistent atrial fibrillation and rapid ventricular response while attempting to work with therapy today.  Cardiac medications are being adjusted.  She does very little to help herself.    Review of systems:  Constitutional:   Positive for significant fatigue and malaise , - fever/chills  HEENT:   Denies ear pain, sore throat, sinus or eye problems.  Cardiovascular:   Denies any chest pain.  Denies any current palpitations.  Respiratory:   - dyspnea at rest, - dyspnea on exertion, - coughing, - sputum, - hemoptysis  Gastrointestinal:   - nausea, -vomiting. - diarrhea, - constipation. - poor appetite and poor intake. - abdominal pain.  Genitourinary:    Denies any urgency, frequency, hematuria. Voiding  without difficulty.  Extremities:   Denies lower extremity swelling, edema or

## 2024-02-24 NOTE — PLAN OF CARE
Problem: Discharge Planning  Goal: Discharge to home or other facility with appropriate resources  Outcome: Progressing     Problem: Cardiovascular - Adult  Goal: Absence of cardiac dysrhythmias or at baseline  Outcome: Progressing     Problem: Skin/Tissue Integrity - Adult  Goal: Skin integrity remains intact  Outcome: Progressing     Problem: Gastrointestinal - Adult  Goal: Minimal or absence of nausea and vomiting  Outcome: Progressing  Goal: Maintains or returns to baseline bowel function  Outcome: Progressing     Problem: Metabolic/Fluid and Electrolytes - Adult  Goal: Electrolytes maintained within normal limits  Outcome: Progressing     Problem: Safety - Adult  Goal: Free from fall injury  Outcome: Progressing     Problem: Pain  Goal: Verbalizes/displays adequate comfort level or baseline comfort level  Outcome: Progressing

## 2024-02-24 NOTE — PROGRESS NOTES
help is needed standing up from a chair using your arms?: A Little  How much help is needed walking in hospital room?: A Little  How much help is needed climbing 3-5 steps with a railing?: A Lot  AM-PAC Inpatient Mobility Raw Score : 17  AM-PAC Inpatient T-Scale Score : 42.13  Mobility Inpatient CMS 0-100% Score: 50.57  Mobility Inpatient CMS G-Code Modifier : CK    Nursing cleared patient for PT evaluation. The admitting diagnosis and active problem list as listed above have been reviewed prior to the initiation of this evaluation.    OBJECTIVE:   Initial Evaluation  Date: 2/24/2024 Treatment Date:     Short Term/ Long Term   Goals   Was pt agreeable to Eval/treatment? Yes  To be met in 3 days   Pain level   0/10       Bed Mobility  Using rails and head of bed elevated:     Rolling: Supervision     Supine to sit: Supervision     Sit to supine: Supervision     Scooting: Supervision     Rolling: Independent    Supine to sit: Independent    Sit to supine: Independent    Scooting: Independent     Transfers Sit to stand: Not assessed  increased HR to 140s sitting EOB   Sit to stand: Independent    Ambulation    not assessed     100 feet using  no device with Independent    Stair negotiation: ascended and descended   Not assessed         ROM Within functional limits    Increase range of motion 10% of affected joints    Strength BUE:  refer to OT eval  RLE:  4/5  LLE:  4/5  Increase strength in affected mm groups by 1/3 grade   Balance Sitting EOB:  good -  Dynamic Standing:  not assessed increased HR  Sitting EOB:  good   Dynamic Standing: good      Patient is Alert & Oriented x person, place, time, and situation and follows directions    Sensation:  Patient  reports numbness/tingling bilateral hands, bilateral feet     Edema:  no   Endurance: fair      Vitals: room air   Blood Pressure at rest  Blood Pressure during session    Heart Rate at rest  Heart Rate during session    SPO2 at rest %  SPO2 during session %      Patient education  Patient educated on role of Physical Therapy, risks of immobility, safety and plan of care,  importance of mobility while in hospital , ankle pumps, quad set and glut set for edema control, blood clot prevention, importance and purpose of adaptive device and adjusted to proper height for the patient., and safety      Patient response to education:   Pt verbalized understanding Pt demonstrated skill Pt requires further education in this area   Yes Partial Yes      Treatment:  Patient practiced and was instructed/facilitated in the following treatment: Patient assisted to EOB. Sat edge of bed 5 minutes with Supervision  to increase dynamic sitting balance and activity tolerance. Pt HR increased to 140s. Pt impulsively sat back down. Eyes remained closed the whole evaluation but pt would response to questions after she was asked 2-3 times.      Therapeutic Exercises:  not performed  x  reps.       At end of session, patient in bed with alarm call light and phone within reach,  all lines and tubes intact, nursing notified.      Patient would benefit from continued skilled Physical Therapy to improve functional independence and quality of life.         Patient's/ family goals   home    Time in  1038  Time out  1054    Total Treatment Time  0 minutes    Evaluation time includes thorough review of current medical information, gathering information on past medical history/social history and prior level of function, completion of standardized testing/informal observation of tasks, assessment of data, and development of Plan of care and goals.     CPT codes:  Low Complexity PT evaluation (78706)  No treatment    Ethan Hall, PT

## 2024-02-24 NOTE — PROGRESS NOTES
INPATIENT CARDIOLOGY FOLLOW-UP    Name: Sheila Alanis    Age: 48 y.o.    Date of Admission: 2/21/2024  6:57 PM    Date of Service: 2/24/2024    Primary Cardiologist: Dr Kuo    Chief Complaint: Follow-up for new onset atrial fibrillation    Interim History:  Remains A-fib RVR on diltiazem infusion despite being mostly asleep.  Per nursing she frequently disconnects her own diltiazem infusion IV.  Can barely stay awake for evaluation, denies chest pain shortness of breath palpitations.    Review of Systems:   Negative except as described above    Problem List:  Patient Active Problem List   Diagnosis    Primary osteoarthritis of left knee    Synovitis    Complex tear of lateral meniscus of left knee as current injury    Depression    Tobacco dependency    Class 1 obesity due to excess calories without serious comorbidity with body mass index (BMI) of 34.0 to 34.9 in adult    Symptomatic anemia    Abnormal EKG    Hypertensive urgency    Chest pain in adult    Iron deficiency anemia    Acute on chronic anemia    Anemia    Abnormal uterine bleeding    Chronic obstructive pulmonary disease (HCC)    Hypertensive emergency    Stroke-like symptoms    PRES (posterior reversible encephalopathy syndrome)    Hypertrophic cardiomyopathy (HCC)    Syncope and collapse    Chest pain    HTN (hypertension)    Other hyperlipidemia    History of transient ischemic attack (TIA)    Numbness, limb    Slurred speech    Atrial flutter with rapid ventricular response (HCC)    Atrial fibrillation with RVR (HCC)       Current Medications:    Current Facility-Administered Medications:     doxycycline hyclate (VIBRAMYCIN) capsule 100 mg, 100 mg, Oral, 2 times per day, Cuong Pate DO    apixaban (ELIQUIS) tablet 5 mg, 5 mg, Oral, BID, Beth Rojas MD, 5 mg at 02/24/24 0905    [Held by provider] minoxidil (LONITEN) tablet 2.5 mg, 2.5 mg, Oral, TID, Consuelo Palafox, DO    amiodarone (CORDARONE) tablet 200 mg, 200 mg, Oral,  Left message for mom call back.  Patient needs to updated us with new PCP.  Letter mailed to patients home and lab order for repeat lipid panel cancelled.

## 2024-02-25 ENCOUNTER — APPOINTMENT (OUTPATIENT)
Dept: INTERVENTIONAL RADIOLOGY/VASCULAR | Age: 49
End: 2024-02-25
Payer: COMMERCIAL

## 2024-02-25 ENCOUNTER — APPOINTMENT (OUTPATIENT)
Dept: CT IMAGING | Age: 49
End: 2024-02-25
Payer: COMMERCIAL

## 2024-02-25 ENCOUNTER — HOSPITAL ENCOUNTER (INPATIENT)
Age: 49
LOS: 5 days | End: 2024-03-01
Attending: EMERGENCY MEDICINE | Admitting: INTERNAL MEDICINE
Payer: COMMERCIAL

## 2024-02-25 ENCOUNTER — ANESTHESIA EVENT (OUTPATIENT)
Dept: INTERVENTIONAL RADIOLOGY/VASCULAR | Age: 49
End: 2024-02-25
Payer: COMMERCIAL

## 2024-02-25 ENCOUNTER — ANESTHESIA (OUTPATIENT)
Dept: INTERVENTIONAL RADIOLOGY/VASCULAR | Age: 49
End: 2024-02-25
Payer: COMMERCIAL

## 2024-02-25 ENCOUNTER — HOSPITAL ENCOUNTER (EMERGENCY)
Age: 49
Discharge: ANOTHER HOSPITAL | End: 2024-02-25
Attending: EMERGENCY MEDICINE
Payer: COMMERCIAL

## 2024-02-25 VITALS
RESPIRATION RATE: 22 BRPM | SYSTOLIC BLOOD PRESSURE: 163 MMHG | TEMPERATURE: 97.8 F | HEIGHT: 68 IN | HEART RATE: 148 BPM | OXYGEN SATURATION: 94 % | WEIGHT: 230 LBS | BODY MASS INDEX: 34.86 KG/M2 | DIASTOLIC BLOOD PRESSURE: 130 MMHG

## 2024-02-25 DIAGNOSIS — I63.9 CEREBROVASCULAR ACCIDENT (CVA), UNSPECIFIED MECHANISM (HCC): Primary | ICD-10-CM

## 2024-02-25 DIAGNOSIS — I63.9 ACUTE CEREBROVASCULAR ACCIDENT (CVA) DUE TO ISCHEMIA (HCC): ICD-10-CM

## 2024-02-25 DIAGNOSIS — I48.91 ATRIAL FIBRILLATION WITH RVR (HCC): ICD-10-CM

## 2024-02-25 LAB
ABO/RH: NORMAL
ACTIVATED CLOTTING TIME, LOW RANGE: 152 SEC
ALBUMIN SERPL-MCNC: 3.5 G/DL (ref 3.5–5.2)
ALP SERPL-CCNC: 118 U/L (ref 35–104)
ALT SERPL-CCNC: 59 U/L (ref 0–32)
AMPHET UR QL SCN: NEGATIVE
ANION GAP SERPL CALCULATED.3IONS-SCNC: 14 MMOL/L (ref 7–16)
ANTIBODY IDENTIFICATION: NORMAL
ANTIBODY IDENTIFICATION: NORMAL
ANTIBODY SCREEN: POSITIVE
ARM BAND NUMBER: NORMAL
AST SERPL-CCNC: 23 U/L (ref 0–31)
BARBITURATES UR QL SCN: NEGATIVE
BASOPHILS # BLD: 0.04 K/UL (ref 0–0.2)
BASOPHILS NFR BLD: 0 % (ref 0–2)
BENZODIAZ UR QL: NEGATIVE
BILIRUB SERPL-MCNC: 0.7 MG/DL (ref 0–1.2)
BLOOD BANK DISPENSE STATUS: NORMAL
BLOOD BANK SAMPLE EXPIRATION: NORMAL
BPU ID: NORMAL
BUN SERPL-MCNC: 11 MG/DL (ref 6–20)
BUPRENORPHINE UR QL: NEGATIVE
CALCIUM SERPL-MCNC: 8.6 MG/DL (ref 8.6–10.2)
CANNABINOIDS UR QL SCN: NEGATIVE
CHLORIDE SERPL-SCNC: 99 MMOL/L (ref 98–107)
CO2 SERPL-SCNC: 22 MMOL/L (ref 22–29)
COCAINE UR QL SCN: NEGATIVE
COMPONENT: NORMAL
CREAT SERPL-MCNC: 0.7 MG/DL (ref 0.5–1)
CROSSMATCH RESULT: NORMAL
DAT, POLYSPECIFIC: NEGATIVE
EOSINOPHIL # BLD: 0.08 K/UL (ref 0.05–0.5)
EOSINOPHILS RELATIVE PERCENT: 1 % (ref 0–6)
ERYTHROCYTE [DISTWIDTH] IN BLOOD BY AUTOMATED COUNT: 18.6 % (ref 11.5–15)
FENTANYL UR QL: NEGATIVE
GFR SERPL CREATININE-BSD FRML MDRD: ABNORMAL ML/MIN/1.73M2
GLUCOSE SERPL-MCNC: 151 MG/DL (ref 74–99)
HCG UR QL: NEGATIVE
HCT VFR BLD AUTO: 40.3 % (ref 34–48)
HGB BLD-MCNC: 12.2 G/DL (ref 11.5–15.5)
IMM GRANULOCYTES # BLD AUTO: 0.12 K/UL (ref 0–0.58)
IMM GRANULOCYTES NFR BLD: 1 % (ref 0–5)
INR PPP: 1.6
LYMPHOCYTES NFR BLD: 2.35 K/UL (ref 1.5–4)
LYMPHOCYTES RELATIVE PERCENT: 23 % (ref 20–42)
MCH RBC QN AUTO: 22.9 PG (ref 26–35)
MCHC RBC AUTO-ENTMCNC: 30.3 G/DL (ref 32–34.5)
MCV RBC AUTO: 75.6 FL (ref 80–99.9)
METHADONE UR QL: NEGATIVE
MICROORGANISM SPEC CULT: NORMAL
MICROORGANISM SPEC CULT: NORMAL
MONOCYTES NFR BLD: 0.91 K/UL (ref 0.1–0.95)
MONOCYTES NFR BLD: 9 % (ref 2–12)
NEUTROPHILS NFR BLD: 66 % (ref 43–80)
NEUTS SEG NFR BLD: 6.79 K/UL (ref 1.8–7.3)
OPIATES UR QL SCN: NEGATIVE
OXYCODONE UR QL SCN: NEGATIVE
PARTIAL THROMBOPLASTIN TIME: 28.9 SEC (ref 24.5–35.1)
PCP UR QL SCN: NEGATIVE
PLATELET # BLD AUTO: 438 K/UL (ref 130–450)
PMV BLD AUTO: 10.3 FL (ref 7–12)
POTASSIUM SERPL-SCNC: 3.2 MMOL/L (ref 3.5–5)
PROT SERPL-MCNC: 6.8 G/DL (ref 6.4–8.3)
PROTHROMBIN TIME: 17.7 SEC (ref 9.3–12.4)
RBC # BLD AUTO: 5.33 M/UL (ref 3.5–5.5)
SERVICE CMNT-IMP: NORMAL
SERVICE CMNT-IMP: NORMAL
SODIUM SERPL-SCNC: 135 MMOL/L (ref 132–146)
SPECIMEN DESCRIPTION: NORMAL
SPECIMEN DESCRIPTION: NORMAL
TEST INFORMATION: NORMAL
TRANSFUSION STATUS: NORMAL
TROPONIN I SERPL HS-MCNC: 82 NG/L (ref 0–9)
TROPONIN I SERPL HS-MCNC: 85 NG/L (ref 0–9)
UNIT DIVISION: 0
WBC OTHER # BLD: 10.3 K/UL (ref 4.5–11.5)

## 2024-02-25 PROCEDURE — 0042T CT BRAIN PERFUSION: CPT

## 2024-02-25 PROCEDURE — 84703 CHORIONIC GONADOTROPIN ASSAY: CPT

## 2024-02-25 PROCEDURE — 80143 DRUG ASSAY ACETAMINOPHEN: CPT

## 2024-02-25 PROCEDURE — 80179 DRUG ASSAY SALICYLATE: CPT

## 2024-02-25 PROCEDURE — 85025 COMPLETE CBC W/AUTO DIFF WBC: CPT

## 2024-02-25 PROCEDURE — 85730 THROMBOPLASTIN TIME PARTIAL: CPT

## 2024-02-25 PROCEDURE — 6360000002 HC RX W HCPCS

## 2024-02-25 PROCEDURE — 51702 INSERT TEMP BLADDER CATH: CPT

## 2024-02-25 PROCEDURE — 61645 PERQ ART M-THROMBECT &/NFS: CPT

## 2024-02-25 PROCEDURE — 87086 URINE CULTURE/COLONY COUNT: CPT

## 2024-02-25 PROCEDURE — 80307 DRUG TEST PRSMV CHEM ANLYZR: CPT

## 2024-02-25 PROCEDURE — 37195 THROMBOLYTIC THERAPY STROKE: CPT

## 2024-02-25 PROCEDURE — 85610 PROTHROMBIN TIME: CPT

## 2024-02-25 PROCEDURE — 99222 1ST HOSP IP/OBS MODERATE 55: CPT | Performed by: INTERNAL MEDICINE

## 2024-02-25 PROCEDURE — 36226 PLACE CATH VERTEBRAL ART: CPT

## 2024-02-25 PROCEDURE — 96374 THER/PROPH/DIAG INJ IV PUSH: CPT

## 2024-02-25 PROCEDURE — 99285 EMERGENCY DEPT VISIT HI MDM: CPT

## 2024-02-25 PROCEDURE — 70450 CT HEAD/BRAIN W/O DYE: CPT

## 2024-02-25 PROCEDURE — 70496 CT ANGIOGRAPHY HEAD: CPT

## 2024-02-25 PROCEDURE — 6360000004 HC RX CONTRAST MEDICATION: Performed by: RADIOLOGY

## 2024-02-25 PROCEDURE — 61645 PERQ ART M-THROMBECT &/NFS: CPT | Performed by: STUDENT IN AN ORGANIZED HEALTH CARE EDUCATION/TRAINING PROGRAM

## 2024-02-25 PROCEDURE — 76377 3D RENDER W/INTRP POSTPROCES: CPT

## 2024-02-25 PROCEDURE — 2000000000 HC ICU R&B

## 2024-02-25 PROCEDURE — 84484 ASSAY OF TROPONIN QUANT: CPT

## 2024-02-25 PROCEDURE — 36224 PLACE CATH CAROTD ART: CPT

## 2024-02-25 PROCEDURE — 2580000003 HC RX 258

## 2024-02-25 PROCEDURE — 3700000001 HC ADD 15 MINUTES (ANESTHESIA): Performed by: STUDENT IN AN ORGANIZED HEALTH CARE EDUCATION/TRAINING PROGRAM

## 2024-02-25 PROCEDURE — 70498 CT ANGIOGRAPHY NECK: CPT

## 2024-02-25 PROCEDURE — 3700000000 HC ANESTHESIA ATTENDED CARE: Performed by: STUDENT IN AN ORGANIZED HEALTH CARE EDUCATION/TRAINING PROGRAM

## 2024-02-25 PROCEDURE — 81003 URINALYSIS AUTO W/O SCOPE: CPT

## 2024-02-25 PROCEDURE — 36226 PLACE CATH VERTEBRAL ART: CPT | Performed by: STUDENT IN AN ORGANIZED HEALTH CARE EDUCATION/TRAINING PROGRAM

## 2024-02-25 PROCEDURE — 36224 PLACE CATH CAROTD ART: CPT | Performed by: STUDENT IN AN ORGANIZED HEALTH CARE EDUCATION/TRAINING PROGRAM

## 2024-02-25 PROCEDURE — G0480 DRUG TEST DEF 1-7 CLASSES: HCPCS

## 2024-02-25 PROCEDURE — 93005 ELECTROCARDIOGRAM TRACING: CPT

## 2024-02-25 PROCEDURE — 85347 COAGULATION TIME ACTIVATED: CPT

## 2024-02-25 PROCEDURE — 99291 CRITICAL CARE FIRST HOUR: CPT | Performed by: STUDENT IN AN ORGANIZED HEALTH CARE EDUCATION/TRAINING PROGRAM

## 2024-02-25 PROCEDURE — 2709999900 IR MECHANICAL ART THROMBECTOMY INTRACRANIAL

## 2024-02-25 PROCEDURE — 80053 COMPREHEN METABOLIC PANEL: CPT

## 2024-02-25 RX ORDER — SODIUM CHLORIDE 0.9 % (FLUSH) 0.9 %
5-40 SYRINGE (ML) INJECTION EVERY 12 HOURS SCHEDULED
Status: DISCONTINUED | OUTPATIENT
Start: 2024-02-25 | End: 2024-02-25 | Stop reason: HOSPADM

## 2024-02-25 RX ORDER — DEXAMETHASONE SODIUM PHOSPHATE 10 MG/ML
INJECTION, EMULSION INTRAMUSCULAR; INTRAVENOUS PRN
Status: DISCONTINUED | OUTPATIENT
Start: 2024-02-25 | End: 2024-02-26 | Stop reason: SDUPTHER

## 2024-02-25 RX ORDER — SODIUM CHLORIDE 0.9 % (FLUSH) 0.9 %
10 SYRINGE (ML) INJECTION ONCE
Status: COMPLETED | OUTPATIENT
Start: 2024-02-25 | End: 2024-02-25

## 2024-02-25 RX ORDER — SUCCINYLCHOLINE CHLORIDE 20 MG/ML
INJECTION INTRAMUSCULAR; INTRAVENOUS PRN
Status: DISCONTINUED | OUTPATIENT
Start: 2024-02-25 | End: 2024-02-26 | Stop reason: SDUPTHER

## 2024-02-25 RX ORDER — FENTANYL CITRATE 50 UG/ML
INJECTION, SOLUTION INTRAMUSCULAR; INTRAVENOUS PRN
Status: DISCONTINUED | OUTPATIENT
Start: 2024-02-25 | End: 2024-02-26 | Stop reason: SDUPTHER

## 2024-02-25 RX ORDER — ROCURONIUM BROMIDE 10 MG/ML
INJECTION, SOLUTION INTRAVENOUS PRN
Status: DISCONTINUED | OUTPATIENT
Start: 2024-02-25 | End: 2024-02-26 | Stop reason: SDUPTHER

## 2024-02-25 RX ORDER — SODIUM CHLORIDE 9 MG/ML
INJECTION, SOLUTION INTRAVENOUS PRN
Status: DISCONTINUED | OUTPATIENT
Start: 2024-02-25 | End: 2024-02-25 | Stop reason: HOSPADM

## 2024-02-25 RX ORDER — ESMOLOL HYDROCHLORIDE 10 MG/ML
INJECTION INTRAVENOUS PRN
Status: DISCONTINUED | OUTPATIENT
Start: 2024-02-25 | End: 2024-02-26 | Stop reason: SDUPTHER

## 2024-02-25 RX ORDER — SODIUM CHLORIDE 9 MG/ML
INJECTION, SOLUTION INTRAVENOUS CONTINUOUS PRN
Status: DISCONTINUED | OUTPATIENT
Start: 2024-02-25 | End: 2024-02-26 | Stop reason: SDUPTHER

## 2024-02-25 RX ORDER — ONDANSETRON 2 MG/ML
INJECTION INTRAMUSCULAR; INTRAVENOUS PRN
Status: DISCONTINUED | OUTPATIENT
Start: 2024-02-25 | End: 2024-02-26 | Stop reason: SDUPTHER

## 2024-02-25 RX ORDER — SODIUM CHLORIDE 0.9 % (FLUSH) 0.9 %
5-40 SYRINGE (ML) INJECTION PRN
Status: DISCONTINUED | OUTPATIENT
Start: 2024-02-25 | End: 2024-02-25 | Stop reason: HOSPADM

## 2024-02-25 RX ORDER — PROPOFOL 10 MG/ML
INJECTION, EMULSION INTRAVENOUS PRN
Status: DISCONTINUED | OUTPATIENT
Start: 2024-02-25 | End: 2024-02-26 | Stop reason: SDUPTHER

## 2024-02-25 RX ORDER — POTASSIUM CHLORIDE 29.8 MG/ML
INJECTION INTRAVENOUS PRN
Status: DISCONTINUED | OUTPATIENT
Start: 2024-02-25 | End: 2024-02-26 | Stop reason: SDUPTHER

## 2024-02-25 RX ORDER — MAGNESIUM SULFATE HEPTAHYDRATE 500 MG/ML
INJECTION, SOLUTION INTRAMUSCULAR; INTRAVENOUS PRN
Status: DISCONTINUED | OUTPATIENT
Start: 2024-02-25 | End: 2024-02-26 | Stop reason: SDUPTHER

## 2024-02-25 RX ORDER — DILTIAZEM HYDROCHLORIDE 5 MG/ML
INJECTION INTRAVENOUS PRN
Status: DISCONTINUED | OUTPATIENT
Start: 2024-02-25 | End: 2024-02-26 | Stop reason: SDUPTHER

## 2024-02-25 RX ADMIN — DILTIAZEM HYDROCHLORIDE 10 MG: 5 INJECTION INTRAVENOUS at 23:10

## 2024-02-25 RX ADMIN — ESMOLOL HYDROCHLORIDE 100 MG: 10 INJECTION, SOLUTION INTRAVENOUS at 22:59

## 2024-02-25 RX ADMIN — ROCURONIUM BROMIDE 10 MG: 10 INJECTION, SOLUTION INTRAVENOUS at 23:45

## 2024-02-25 RX ADMIN — PHENYLEPHRINE HYDROCHLORIDE 200 MCG: 10 INJECTION INTRAVENOUS at 22:59

## 2024-02-25 RX ADMIN — FENTANYL CITRATE 100 MCG: 50 INJECTION, SOLUTION INTRAMUSCULAR; INTRAVENOUS at 22:59

## 2024-02-25 RX ADMIN — POTASSIUM CHLORIDE 20 MEQ: 29.8 INJECTION, SOLUTION INTRAVENOUS at 23:45

## 2024-02-25 RX ADMIN — MAGNESIUM SULFATE HEPTAHYDRATE 2 G: 500 INJECTION, SOLUTION INTRAMUSCULAR; INTRAVENOUS at 23:05

## 2024-02-25 RX ADMIN — SODIUM CHLORIDE 5 MG/HR: 0.9 INJECTION, SOLUTION INTRAVENOUS at 22:59

## 2024-02-25 RX ADMIN — ONDANSETRON HYDROCHLORIDE 4 MG: 2 SOLUTION INTRAMUSCULAR; INTRAVENOUS at 23:11

## 2024-02-25 RX ADMIN — Medication 10 ML: at 21:41

## 2024-02-25 RX ADMIN — SODIUM CHLORIDE: 9 INJECTION, SOLUTION INTRAVENOUS at 22:53

## 2024-02-25 RX ADMIN — ROCURONIUM BROMIDE 30 MG: 10 INJECTION, SOLUTION INTRAVENOUS at 23:10

## 2024-02-25 RX ADMIN — Medication 10 ML: at 21:42

## 2024-02-25 RX ADMIN — DEXAMETHASONE SODIUM PHOSPHATE 10 MG: 10 INJECTION, EMULSION INTRAMUSCULAR; INTRAVENOUS at 23:11

## 2024-02-25 RX ADMIN — IOPAMIDOL 150 ML: 755 INJECTION, SOLUTION INTRAVENOUS at 21:23

## 2024-02-25 RX ADMIN — DILTIAZEM HYDROCHLORIDE 5 MG: 5 INJECTION INTRAVENOUS at 23:27

## 2024-02-25 RX ADMIN — PROPOFOL 100 MG: 10 INJECTION, EMULSION INTRAVENOUS at 22:59

## 2024-02-25 RX ADMIN — Medication 25 MG: at 21:42

## 2024-02-25 RX ADMIN — SUCCINYLCHOLINE CHLORIDE 200 MG: 20 INJECTION, SOLUTION INTRAMUSCULAR; INTRAVENOUS at 22:59

## 2024-02-25 NOTE — PROGRESS NOTES
Pt. Seen at bedside at 1923 for assessment in which she stated \" I want to go home.\" RN educated the patient on her heart condition of atrial fibrillation with RVR. She continued to insist on leaving the hospital. Following this conversation RN contacted Dr. Palafox to notify him of the patients request. Dr. Palafox requested that RN further educate the patient on adverse health events that may occur if she leaves AMA. RN utilized the teach-back method to ensure her understanding. Additionally, RN reiterated the importance of following up with the cardiologist and her PCP. All 2000 & 2100 cardiac medications given to patient at this time.

## 2024-02-25 NOTE — DISCHARGE SUMMARY
Internal Medicine Progress Note     [facility]     Darrion See D.O., MINOO Pate D.O., MINOO Mayfield D.O.     Flakita Cardona, MSN, APRN, NP-C  Surjit Aldridge, MSN, APRN-CNP  Rohith Murillo, MSN, APRN, NP-C       Internal Medicine  Discharge Summary    NAME: Sheila Alanis  :  1975  MRN:  [id number]  PCP:Potocki, Joseph A, DO  ADMITTED: 2024      DISCHARGED: 24    ADMITTING PHYSICIAN: No att. providers found    CONSULTANT(S):   IP CONSULT TO CARDIOLOGY  IP CONSULT TO SOCIAL WORK     ADMITTING DIAGNOSIS:   NSTEMI (non-ST elevated myocardial infarction) ([facility]) [I21.4]  Atrial flutter with rapid ventricular response ([facility]) [I48.92]  Atrial fibrillation with RVR ([facility]) [I48.91]  Acute respiratory failure with hypoxia ([facility]) [J96.01]  Pneumonia of both lower lobes due to infectious organism [J18.9]     DISCHARGE DIAGNOSES:   Patient left AGAINST MEDICAL ADVICE  Sepsis (present on admission as evidenced by leukocytosis and abnormal vital signs) secondary to community-acquired pneumonia   Atrial fibrillation with rapid ventricular response  COPD exacerbation.  Elevated troponin possibly demand ischemia  HOCM  Chronic diastolic congestive heart failure  History of TIA  Epilepsy  Factor VIII deficiency  History of H. pylori  History of carotid artery stenosis/thrombosis  Hyperlipidemia  Essential hypertension  Depression  Current tobacco abuse    BRIEF HISTORY OF PRESENT ILLNESS:   Patient is a 48-year-old female presented to the ED with sickness for the last week.  Patient states that she has been sick for the last several days.  She admits to nausea and emesis.  She has not been able to keep anything down without bringing it back up.  She also admits to diarrhea and also admits to dark melanotic appearing stool.  She admits to increased fatigue.  She admits to shortness of breath and productive cough.  She complains of

## 2024-02-26 ENCOUNTER — APPOINTMENT (OUTPATIENT)
Dept: CT IMAGING | Age: 49
End: 2024-02-26
Payer: COMMERCIAL

## 2024-02-26 ENCOUNTER — APPOINTMENT (OUTPATIENT)
Dept: GENERAL RADIOLOGY | Age: 49
End: 2024-02-26
Payer: COMMERCIAL

## 2024-02-26 PROBLEM — I48.91 ATRIAL FIBRILLATION WITH RVR (HCC): Status: ACTIVE | Noted: 2024-02-26

## 2024-02-26 LAB
ALBUMIN SERPL-MCNC: 3.6 G/DL (ref 3.5–5.2)
ALBUMIN SERPL-MCNC: 3.6 G/DL (ref 3.5–5.2)
ALP SERPL-CCNC: 100 U/L (ref 35–104)
ALP SERPL-CCNC: 99 U/L (ref 35–104)
ALT SERPL-CCNC: 51 U/L (ref 0–32)
ALT SERPL-CCNC: 51 U/L (ref 0–32)
ANION GAP SERPL CALCULATED.3IONS-SCNC: 10 MMOL/L (ref 7–16)
ANION GAP SERPL CALCULATED.3IONS-SCNC: 11 MMOL/L (ref 7–16)
APAP SERPL-MCNC: <5 UG/ML (ref 10–30)
AST SERPL-CCNC: 21 U/L (ref 0–31)
AST SERPL-CCNC: 22 U/L (ref 0–31)
B.E.: -2.7 MMOL/L (ref -3–3)
BASOPHILS # BLD: 0.03 K/UL (ref 0–0.2)
BASOPHILS NFR BLD: 0 % (ref 0–2)
BILIRUB SERPL-MCNC: 0.5 MG/DL (ref 0–1.2)
BILIRUB SERPL-MCNC: 0.6 MG/DL (ref 0–1.2)
BILIRUB UR QL STRIP: NEGATIVE
BUN SERPL-MCNC: 12 MG/DL (ref 6–20)
BUN SERPL-MCNC: 13 MG/DL (ref 6–20)
CA-I BLD-SCNC: 1.12 MMOL/L (ref 1.15–1.33)
CALCIUM SERPL-MCNC: 7.8 MG/DL (ref 8.6–10.2)
CALCIUM SERPL-MCNC: 8 MG/DL (ref 8.6–10.2)
CHLORIDE SERPL-SCNC: 104 MMOL/L (ref 98–107)
CHLORIDE SERPL-SCNC: 107 MMOL/L (ref 98–107)
CHOLEST SERPL-MCNC: 163 MG/DL
CLARITY UR: CLEAR
CLOSURE TME COLL+ADP BLD: 50 SEC (ref 48–103)
CO2 SERPL-SCNC: 22 MMOL/L (ref 22–29)
CO2 SERPL-SCNC: 23 MMOL/L (ref 22–29)
COHB: 0.4 % (ref 0–1.5)
COLLAGEN EPINEPHRINE TIME: 73 SEC (ref 83–176)
COLOR UR: YELLOW
CREAT SERPL-MCNC: 0.6 MG/DL (ref 0.5–1)
CREAT SERPL-MCNC: 0.6 MG/DL (ref 0.5–1)
CRITICAL: ABNORMAL
DATE ANALYZED: ABNORMAL
DATE OF COLLECTION: ABNORMAL
EKG ATRIAL RATE: 141 BPM
EKG Q-T INTERVAL: 338 MS
EKG QRS DURATION: 114 MS
EKG QTC CALCULATION (BAZETT): 519 MS
EKG R AXIS: 12 DEGREES
EKG T AXIS: -162 DEGREES
EKG VENTRICULAR RATE: 142 BPM
EOSINOPHIL # BLD: 0.02 K/UL (ref 0.05–0.5)
EOSINOPHILS RELATIVE PERCENT: 0 % (ref 0–6)
ERYTHROCYTE [DISTWIDTH] IN BLOOD BY AUTOMATED COUNT: 18.4 % (ref 11.5–15)
ERYTHROCYTE [SEDIMENTATION RATE] IN BLOOD BY WESTERGREN METHOD: 11 MM/HR (ref 0–20)
ETHANOLAMINE SERPL-MCNC: <10 MG/DL
FACTOR IX ACTIVITY: 143 % (ref 65–150)
FACTOR VII ACTIVITY: 43 % (ref 50–150)
FACTOR VIII ACTIVITY: >150 % (ref 50–150)
FIBRINOGEN PPP-MCNC: 299 MG/DL (ref 200–400)
GFR SERPL CREATININE-BSD FRML MDRD: >60 ML/MIN/1.73M2
GFR SERPL CREATININE-BSD FRML MDRD: >60 ML/MIN/1.73M2
GLUCOSE SERPL-MCNC: 181 MG/DL (ref 74–99)
GLUCOSE SERPL-MCNC: 184 MG/DL (ref 74–99)
GLUCOSE UR STRIP-MCNC: NEGATIVE MG/DL
HBA1C MFR BLD: 6.8 % (ref 4–5.6)
HCO3: 20.8 MMOL/L (ref 22–26)
HCT VFR BLD AUTO: 39.4 % (ref 34–48)
HDLC SERPL-MCNC: 25 MG/DL
HGB BLD-MCNC: 11.6 G/DL (ref 11.5–15.5)
HGB UR QL STRIP.AUTO: NEGATIVE
HHB: 1.3 % (ref 0–5)
IMM GRANULOCYTES # BLD AUTO: 0.2 K/UL (ref 0–0.58)
IMM GRANULOCYTES NFR BLD: 1 % (ref 0–5)
INR PPP: 1.5
KETONES UR STRIP-MCNC: ABNORMAL MG/DL
LAB: ABNORMAL
LDLC SERPL CALC-MCNC: 114 MG/DL
LEUKOCYTE ESTERASE UR QL STRIP: NEGATIVE
LYMPHOCYTES NFR BLD: 0.88 K/UL (ref 1.5–4)
LYMPHOCYTES RELATIVE PERCENT: 6 % (ref 20–42)
Lab: 1935
MAGNESIUM SERPL-MCNC: 2.3 MG/DL (ref 1.6–2.6)
MAGNESIUM SERPL-MCNC: 2.7 MG/DL (ref 1.6–2.6)
MCH RBC QN AUTO: 23 PG (ref 26–35)
MCHC RBC AUTO-ENTMCNC: 29.4 G/DL (ref 32–34.5)
MCV RBC AUTO: 78.2 FL (ref 80–99.9)
METHB: 0.4 % (ref 0–1.5)
MODE: ABNORMAL
MONOCYTES NFR BLD: 0.29 K/UL (ref 0.1–0.95)
MONOCYTES NFR BLD: 2 % (ref 2–12)
NEUTROPHILS NFR BLD: 90 % (ref 43–80)
NEUTS SEG NFR BLD: 12.66 K/UL (ref 1.8–7.3)
NITRITE UR QL STRIP: NEGATIVE
O2 SATURATION: 98.7 % (ref 92–98.5)
O2HB: 97.9 % (ref 94–97)
OPERATOR ID: 914
PARTIAL THROMBOPLASTIN TIME: 26.6 SEC (ref 24.5–35.1)
PATIENT TEMP: 37 C
PCO2: 32.2 MMHG (ref 35–45)
PH BLOOD GAS: 7.43 (ref 7.35–7.45)
PH UR STRIP: 7.5 [PH] (ref 5–9)
PLATELET # BLD AUTO: 363 K/UL (ref 130–450)
PLATELET FUNCTION INTERP: ABNORMAL
PMV BLD AUTO: 10.3 FL (ref 7–12)
PO2: 159.7 MMHG (ref 75–100)
POTASSIUM SERPL-SCNC: 3.7 MMOL/L (ref 3.5–5)
POTASSIUM SERPL-SCNC: 3.8 MMOL/L (ref 3.5–5)
PROT SERPL-MCNC: 6.3 G/DL (ref 6.4–8.3)
PROT SERPL-MCNC: 6.3 G/DL (ref 6.4–8.3)
PROT UR STRIP-MCNC: NEGATIVE MG/DL
PROTHROMBIN TIME: 15.9 SEC (ref 9.3–12.4)
RBC # BLD AUTO: 5.04 M/UL (ref 3.5–5.5)
SALICYLATES SERPL-MCNC: <0.3 MG/DL (ref 0–30)
SODIUM SERPL-SCNC: 138 MMOL/L (ref 132–146)
SODIUM SERPL-SCNC: 139 MMOL/L (ref 132–146)
SOURCE, BLOOD GAS: ABNORMAL
SP GR UR STRIP: <1.005 (ref 1–1.03)
THB: 11.5 G/DL (ref 11.5–16.5)
TIME ANALYZED: 1941
TOXIC TRICYCLIC SC,BLOOD: NEGATIVE
TRIGL SERPL-MCNC: 118 MG/DL
TSH SERPL DL<=0.05 MIU/L-ACNC: 1.01 UIU/ML (ref 0.27–4.2)
UROBILINOGEN UR STRIP-ACNC: 0.2 EU/DL (ref 0–1)
VLDLC SERPL CALC-MCNC: 24 MG/DL
WBC OTHER # BLD: 14.1 K/UL (ref 4.5–11.5)

## 2024-02-26 PROCEDURE — 71045 X-RAY EXAM CHEST 1 VIEW: CPT

## 2024-02-26 PROCEDURE — 93005 ELECTROCARDIOGRAM TRACING: CPT | Performed by: STUDENT IN AN ORGANIZED HEALTH CARE EDUCATION/TRAINING PROGRAM

## 2024-02-26 PROCEDURE — 99222 1ST HOSP IP/OBS MODERATE 55: CPT | Performed by: NEUROLOGICAL SURGERY

## 2024-02-26 PROCEDURE — A4216 STERILE WATER/SALINE, 10 ML: HCPCS | Performed by: CLINICAL NURSE SPECIALIST

## 2024-02-26 PROCEDURE — 85384 FIBRINOGEN ACTIVITY: CPT

## 2024-02-26 PROCEDURE — 2580000003 HC RX 258: Performed by: INTERNAL MEDICINE

## 2024-02-26 PROCEDURE — 93010 ELECTROCARDIOGRAM REPORT: CPT | Performed by: INTERNAL MEDICINE

## 2024-02-26 PROCEDURE — 99291 CRITICAL CARE FIRST HOUR: CPT | Performed by: INTERNAL MEDICINE

## 2024-02-26 PROCEDURE — 85240 CLOT FACTOR VIII AHG 1 STAGE: CPT

## 2024-02-26 PROCEDURE — 03CG3ZZ EXTIRPATION OF MATTER FROM INTRACRANIAL ARTERY, PERCUTANEOUS APPROACH: ICD-10-PCS | Performed by: STUDENT IN AN ORGANIZED HEALTH CARE EDUCATION/TRAINING PROGRAM

## 2024-02-26 PROCEDURE — 82330 ASSAY OF CALCIUM: CPT

## 2024-02-26 PROCEDURE — 99291 CRITICAL CARE FIRST HOUR: CPT | Performed by: CLINICAL NURSE SPECIALIST

## 2024-02-26 PROCEDURE — 80061 LIPID PANEL: CPT

## 2024-02-26 PROCEDURE — 6360000002 HC RX W HCPCS

## 2024-02-26 PROCEDURE — 2700000000 HC OXYGEN THERAPY PER DAY

## 2024-02-26 PROCEDURE — 84443 ASSAY THYROID STIM HORMONE: CPT

## 2024-02-26 PROCEDURE — 2500000003 HC RX 250 WO HCPCS: Performed by: CLINICAL NURSE SPECIALIST

## 2024-02-26 PROCEDURE — 85230 CLOT FACTOR VII PROCONVERTIN: CPT

## 2024-02-26 PROCEDURE — 87040 BLOOD CULTURE FOR BACTERIA: CPT

## 2024-02-26 PROCEDURE — 80053 COMPREHEN METABOLIC PANEL: CPT

## 2024-02-26 PROCEDURE — 2500000003 HC RX 250 WO HCPCS: Performed by: STUDENT IN AN ORGANIZED HEALTH CARE EDUCATION/TRAINING PROGRAM

## 2024-02-26 PROCEDURE — 85260 CLOT FACTOR X STUART-POWER: CPT

## 2024-02-26 PROCEDURE — 2580000003 HC RX 258: Performed by: STUDENT IN AN ORGANIZED HEALTH CARE EDUCATION/TRAINING PROGRAM

## 2024-02-26 PROCEDURE — 7100000001 HC PACU RECOVERY - ADDTL 15 MIN

## 2024-02-26 PROCEDURE — 2580000003 HC RX 258: Performed by: CLINICAL NURSE SPECIALIST

## 2024-02-26 PROCEDURE — 61645 PERQ ART M-THROMBECT &/NFS: CPT

## 2024-02-26 PROCEDURE — 85270 CLOT FACTOR XI PTA: CPT

## 2024-02-26 PROCEDURE — 6370000000 HC RX 637 (ALT 250 FOR IP): Performed by: STUDENT IN AN ORGANIZED HEALTH CARE EDUCATION/TRAINING PROGRAM

## 2024-02-26 PROCEDURE — 85576 BLOOD PLATELET AGGREGATION: CPT

## 2024-02-26 PROCEDURE — 82805 BLOOD GASES W/O2 SATURATION: CPT

## 2024-02-26 PROCEDURE — 2580000003 HC RX 258

## 2024-02-26 PROCEDURE — 99254 IP/OBS CNSLTJ NEW/EST MOD 60: CPT | Performed by: INTERNAL MEDICINE

## 2024-02-26 PROCEDURE — 81241 F5 GENE: CPT

## 2024-02-26 PROCEDURE — 2000000000 HC ICU R&B

## 2024-02-26 PROCEDURE — 85610 PROTHROMBIN TIME: CPT

## 2024-02-26 PROCEDURE — 83036 HEMOGLOBIN GLYCOSYLATED A1C: CPT

## 2024-02-26 PROCEDURE — B3171ZZ FLUOROSCOPY OF LEFT INTERNAL CAROTID ARTERY USING LOW OSMOLAR CONTRAST: ICD-10-PCS | Performed by: STUDENT IN AN ORGANIZED HEALTH CARE EDUCATION/TRAINING PROGRAM

## 2024-02-26 PROCEDURE — 85025 COMPLETE CBC W/AUTO DIFF WBC: CPT

## 2024-02-26 PROCEDURE — 7100000000 HC PACU RECOVERY - FIRST 15 MIN

## 2024-02-26 PROCEDURE — 6360000002 HC RX W HCPCS: Performed by: CLINICAL NURSE SPECIALIST

## 2024-02-26 PROCEDURE — 36415 COLL VENOUS BLD VENIPUNCTURE: CPT

## 2024-02-26 PROCEDURE — 6360000002 HC RX W HCPCS: Performed by: STUDENT IN AN ORGANIZED HEALTH CARE EDUCATION/TRAINING PROGRAM

## 2024-02-26 PROCEDURE — 37799 UNLISTED PX VASCULAR SURGERY: CPT

## 2024-02-26 PROCEDURE — 85250 CLOT FACTOR IX PTC/CHRSTMAS: CPT

## 2024-02-26 PROCEDURE — 83735 ASSAY OF MAGNESIUM: CPT

## 2024-02-26 PROCEDURE — 2500000003 HC RX 250 WO HCPCS

## 2024-02-26 PROCEDURE — 85652 RBC SED RATE AUTOMATED: CPT

## 2024-02-26 PROCEDURE — 36224 PLACE CATH CAROTD ART: CPT

## 2024-02-26 PROCEDURE — 6360000002 HC RX W HCPCS: Performed by: INTERNAL MEDICINE

## 2024-02-26 PROCEDURE — 70450 CT HEAD/BRAIN W/O DYE: CPT

## 2024-02-26 PROCEDURE — 85730 THROMBOPLASTIN TIME PARTIAL: CPT

## 2024-02-26 PROCEDURE — 85306 CLOT INHIBIT PROT S FREE: CPT

## 2024-02-26 PROCEDURE — 36226 PLACE CATH VERTEBRAL ART: CPT

## 2024-02-26 PROCEDURE — 6360000004 HC RX CONTRAST MEDICATION: Performed by: STUDENT IN AN ORGANIZED HEALTH CARE EDUCATION/TRAINING PROGRAM

## 2024-02-26 PROCEDURE — 76377 3D RENDER W/INTRP POSTPROCES: CPT

## 2024-02-26 PROCEDURE — 99232 SBSQ HOSP IP/OBS MODERATE 35: CPT | Performed by: INTERNAL MEDICINE

## 2024-02-26 RX ORDER — SENNOSIDES A AND B 8.6 MG/1
1 TABLET, FILM COATED ORAL DAILY PRN
Status: CANCELLED | OUTPATIENT
Start: 2024-02-26

## 2024-02-26 RX ORDER — ONDANSETRON 4 MG/1
4 TABLET, ORALLY DISINTEGRATING ORAL EVERY 8 HOURS PRN
Status: CANCELLED | OUTPATIENT
Start: 2024-02-26

## 2024-02-26 RX ORDER — ACETAMINOPHEN 325 MG/1
650 TABLET ORAL EVERY 6 HOURS PRN
Status: CANCELLED | OUTPATIENT
Start: 2024-02-26

## 2024-02-26 RX ORDER — METOPROLOL TARTRATE 1 MG/ML
5 INJECTION, SOLUTION INTRAVENOUS ONCE
Status: COMPLETED | OUTPATIENT
Start: 2024-02-26 | End: 2024-02-26

## 2024-02-26 RX ORDER — MAGNESIUM SULFATE IN WATER 40 MG/ML
2000 INJECTION, SOLUTION INTRAVENOUS PRN
Status: CANCELLED | OUTPATIENT
Start: 2024-02-26

## 2024-02-26 RX ORDER — SODIUM CHLORIDE 0.9 % (FLUSH) 0.9 %
5-40 SYRINGE (ML) INJECTION 2 TIMES DAILY
Status: DISCONTINUED | OUTPATIENT
Start: 2024-02-26 | End: 2024-03-01 | Stop reason: HOSPADM

## 2024-02-26 RX ORDER — INSULIN LISPRO 100 [IU]/ML
0-4 INJECTION, SOLUTION INTRAVENOUS; SUBCUTANEOUS
Status: CANCELLED | OUTPATIENT
Start: 2024-02-26

## 2024-02-26 RX ORDER — POTASSIUM CHLORIDE 7.45 MG/ML
10 INJECTION INTRAVENOUS PRN
Status: CANCELLED | OUTPATIENT
Start: 2024-02-26

## 2024-02-26 RX ORDER — SODIUM CHLORIDE 0.9 % (FLUSH) 0.9 %
5-40 SYRINGE (ML) INJECTION EVERY 12 HOURS SCHEDULED
Status: CANCELLED | OUTPATIENT
Start: 2024-02-26

## 2024-02-26 RX ORDER — ONDANSETRON 2 MG/ML
4 INJECTION INTRAMUSCULAR; INTRAVENOUS EVERY 6 HOURS PRN
Status: CANCELLED | OUTPATIENT
Start: 2024-02-26

## 2024-02-26 RX ORDER — SODIUM CHLORIDE 9 MG/ML
INJECTION, SOLUTION INTRAVENOUS CONTINUOUS
Status: ACTIVE | OUTPATIENT
Start: 2024-02-26 | End: 2024-02-26

## 2024-02-26 RX ORDER — CARVEDILOL 25 MG/1
25 TABLET ORAL 2 TIMES DAILY
Status: DISCONTINUED | OUTPATIENT
Start: 2024-02-26 | End: 2024-02-28

## 2024-02-26 RX ORDER — HYDRALAZINE HYDROCHLORIDE 20 MG/ML
10 INJECTION INTRAMUSCULAR; INTRAVENOUS EVERY 30 MIN PRN
Status: DISCONTINUED | OUTPATIENT
Start: 2024-02-26 | End: 2024-03-01 | Stop reason: HOSPADM

## 2024-02-26 RX ORDER — AMIODARONE HYDROCHLORIDE 50 MG/ML
INJECTION, SOLUTION INTRAVENOUS
Status: COMPLETED
Start: 2024-02-26 | End: 2024-02-26

## 2024-02-26 RX ORDER — LABETALOL HYDROCHLORIDE 5 MG/ML
10 INJECTION, SOLUTION INTRAVENOUS
Status: DISCONTINUED | OUTPATIENT
Start: 2024-02-26 | End: 2024-02-29

## 2024-02-26 RX ORDER — HEPARIN SODIUM 10000 [USP'U]/ML
INJECTION, SOLUTION INTRAVENOUS; SUBCUTANEOUS PRN
Status: COMPLETED | OUTPATIENT
Start: 2024-02-26 | End: 2024-02-26

## 2024-02-26 RX ORDER — DIGOXIN 0.25 MG/ML
250 INJECTION INTRAMUSCULAR; INTRAVENOUS ONCE
Status: DISCONTINUED | OUTPATIENT
Start: 2024-02-26 | End: 2024-02-26

## 2024-02-26 RX ORDER — POLYETHYLENE GLYCOL 3350 17 G/17G
17 POWDER, FOR SOLUTION ORAL DAILY
Status: DISCONTINUED | OUTPATIENT
Start: 2024-02-26 | End: 2024-02-28

## 2024-02-26 RX ORDER — SODIUM CHLORIDE 0.9 % (FLUSH) 0.9 %
5-40 SYRINGE (ML) INJECTION PRN
Status: CANCELLED | OUTPATIENT
Start: 2024-02-26

## 2024-02-26 RX ORDER — ONDANSETRON 2 MG/ML
4 INJECTION INTRAMUSCULAR; INTRAVENOUS EVERY 6 HOURS PRN
Status: DISCONTINUED | OUTPATIENT
Start: 2024-02-26 | End: 2024-03-01 | Stop reason: HOSPADM

## 2024-02-26 RX ORDER — ACETAMINOPHEN 325 MG/1
650 TABLET ORAL EVERY 4 HOURS PRN
Status: DISCONTINUED | OUTPATIENT
Start: 2024-02-26 | End: 2024-02-28

## 2024-02-26 RX ORDER — POTASSIUM CHLORIDE 20 MEQ/1
40 TABLET, EXTENDED RELEASE ORAL PRN
Status: CANCELLED | OUTPATIENT
Start: 2024-02-26

## 2024-02-26 RX ORDER — DEXTROSE MONOHYDRATE 100 MG/ML
INJECTION, SOLUTION INTRAVENOUS CONTINUOUS PRN
Status: CANCELLED | OUTPATIENT
Start: 2024-02-26

## 2024-02-26 RX ORDER — SENNA AND DOCUSATE SODIUM 50; 8.6 MG/1; MG/1
2 TABLET, FILM COATED ORAL DAILY
Status: DISCONTINUED | OUTPATIENT
Start: 2024-02-26 | End: 2024-02-28

## 2024-02-26 RX ORDER — SODIUM CHLORIDE 9 MG/ML
INJECTION, SOLUTION INTRAVENOUS CONTINUOUS
Status: CANCELLED | OUTPATIENT
Start: 2024-02-26 | End: 2024-02-27

## 2024-02-26 RX ORDER — LEVETIRACETAM 500 MG/5ML
500 INJECTION, SOLUTION, CONCENTRATE INTRAVENOUS EVERY 12 HOURS
Status: DISCONTINUED | OUTPATIENT
Start: 2024-02-26 | End: 2024-02-29

## 2024-02-26 RX ORDER — POTASSIUM CHLORIDE 20 MEQ/1
40 TABLET, EXTENDED RELEASE ORAL ONCE
Status: CANCELLED | OUTPATIENT
Start: 2024-02-26 | End: 2024-02-26

## 2024-02-26 RX ORDER — ACETAMINOPHEN 650 MG/1
650 SUPPOSITORY RECTAL EVERY 6 HOURS PRN
Status: CANCELLED | OUTPATIENT
Start: 2024-02-26

## 2024-02-26 RX ORDER — SODIUM CHLORIDE 9 MG/ML
INJECTION, SOLUTION INTRAVENOUS PRN
Status: CANCELLED | OUTPATIENT
Start: 2024-02-26

## 2024-02-26 RX ORDER — SODIUM CHLORIDE 0.9 % (FLUSH) 0.9 %
5-40 SYRINGE (ML) INJECTION PRN
Status: DISCONTINUED | OUTPATIENT
Start: 2024-02-26 | End: 2024-03-01 | Stop reason: HOSPADM

## 2024-02-26 RX ORDER — VASOPRESSIN 20 U/ML
INJECTION PARENTERAL PRN
Status: DISCONTINUED | OUTPATIENT
Start: 2024-02-26 | End: 2024-02-26 | Stop reason: SDUPTHER

## 2024-02-26 RX ORDER — GLUCAGON 1 MG/ML
1 KIT INJECTION PRN
Status: CANCELLED | OUTPATIENT
Start: 2024-02-26

## 2024-02-26 RX ORDER — MAGNESIUM HYDROXIDE/ALUMINUM HYDROXICE/SIMETHICONE 120; 1200; 1200 MG/30ML; MG/30ML; MG/30ML
30 SUSPENSION ORAL EVERY 6 HOURS PRN
Status: CANCELLED | OUTPATIENT
Start: 2024-02-26

## 2024-02-26 RX ORDER — CALCIUM GLUCONATE 10 MG/ML
1000 INJECTION, SOLUTION INTRAVENOUS ONCE
Status: COMPLETED | OUTPATIENT
Start: 2024-02-26 | End: 2024-02-26

## 2024-02-26 RX ORDER — ATORVASTATIN CALCIUM 40 MG/1
40 TABLET, FILM COATED ORAL NIGHTLY
Status: DISCONTINUED | OUTPATIENT
Start: 2024-02-26 | End: 2024-02-28

## 2024-02-26 RX ORDER — INSULIN LISPRO 100 [IU]/ML
0-4 INJECTION, SOLUTION INTRAVENOUS; SUBCUTANEOUS NIGHTLY
Status: CANCELLED | OUTPATIENT
Start: 2024-02-26

## 2024-02-26 RX ADMIN — FAMOTIDINE 20 MG: 10 INJECTION, SOLUTION INTRAVENOUS at 23:42

## 2024-02-26 RX ADMIN — SODIUM CHLORIDE 5 MG/HR: 900 INJECTION, SOLUTION INTRAVENOUS at 04:31

## 2024-02-26 RX ADMIN — Medication 5000 UNITS: at 00:21

## 2024-02-26 RX ADMIN — LEVETIRACETAM 500 MG: 100 INJECTION INTRAVENOUS at 07:56

## 2024-02-26 RX ADMIN — Medication 5000 UNITS: at 00:19

## 2024-02-26 RX ADMIN — Medication 1000 ML: at 00:19

## 2024-02-26 RX ADMIN — PHENYLEPHRINE HYDROCHLORIDE 100 MCG: 10 INJECTION INTRAVENOUS at 00:21

## 2024-02-26 RX ADMIN — AMIODARONE HYDROCHLORIDE 150 MG: 50 INJECTION, SOLUTION INTRAVENOUS at 15:10

## 2024-02-26 RX ADMIN — SUGAMMADEX 200 MG: 100 INJECTION, SOLUTION INTRAVENOUS at 01:21

## 2024-02-26 RX ADMIN — METOPROLOL TARTRATE 5 MG: 1 INJECTION, SOLUTION INTRAVENOUS at 03:55

## 2024-02-26 RX ADMIN — DILTIAZEM HYDROCHLORIDE 5 MG: 5 INJECTION INTRAVENOUS at 00:10

## 2024-02-26 RX ADMIN — LEVETIRACETAM 500 MG: 100 INJECTION INTRAVENOUS at 21:58

## 2024-02-26 RX ADMIN — ROCURONIUM BROMIDE 10 MG: 10 INJECTION, SOLUTION INTRAVENOUS at 00:19

## 2024-02-26 RX ADMIN — METOPROLOL TARTRATE 5 MG: 1 INJECTION, SOLUTION INTRAVENOUS at 04:15

## 2024-02-26 RX ADMIN — AMIODARONE HYDROCHLORIDE 1 MG/MIN: 50 INJECTION, SOLUTION INTRAVENOUS at 15:44

## 2024-02-26 RX ADMIN — PHENYLEPHRINE HYDROCHLORIDE 200 MCG: 10 INJECTION INTRAVENOUS at 00:27

## 2024-02-26 RX ADMIN — ROCURONIUM BROMIDE 10 MG: 10 INJECTION, SOLUTION INTRAVENOUS at 00:11

## 2024-02-26 RX ADMIN — VASOPRESSIN 1 UNITS: 20 INJECTION INTRAVENOUS at 00:31

## 2024-02-26 RX ADMIN — SODIUM CHLORIDE: 9 INJECTION, SOLUTION INTRAVENOUS at 00:33

## 2024-02-26 RX ADMIN — SODIUM CHLORIDE 10 MG/HR: 900 INJECTION, SOLUTION INTRAVENOUS at 13:28

## 2024-02-26 RX ADMIN — SODIUM CHLORIDE, PRESERVATIVE FREE 10 ML: 5 INJECTION INTRAVENOUS at 09:47

## 2024-02-26 RX ADMIN — CALCIUM GLUCONATE 1000 MG: 10 INJECTION, SOLUTION INTRAVENOUS at 08:03

## 2024-02-26 RX ADMIN — Medication 1000 ML: at 00:18

## 2024-02-26 RX ADMIN — SODIUM CHLORIDE: 9 INJECTION, SOLUTION INTRAVENOUS at 01:45

## 2024-02-26 RX ADMIN — METOPROLOL TARTRATE 5 MG: 5 INJECTION INTRAVENOUS at 03:39

## 2024-02-26 RX ADMIN — ATORVASTATIN CALCIUM 40 MG: 40 TABLET, FILM COATED ORAL at 23:41

## 2024-02-26 RX ADMIN — IOPAMIDOL 220 ML: 612 INJECTION, SOLUTION INTRAVENOUS at 01:21

## 2024-02-26 RX ADMIN — HYDRALAZINE HYDROCHLORIDE 75 MG: 50 TABLET ORAL at 23:43

## 2024-02-26 RX ADMIN — SODIUM CHLORIDE: 9 INJECTION, SOLUTION INTRAVENOUS at 09:41

## 2024-02-26 RX ADMIN — CARVEDILOL 25 MG: 25 TABLET, FILM COATED ORAL at 23:42

## 2024-02-26 NOTE — ANESTHESIA PROCEDURE NOTES
Arterial Line:    An arterial line was placed in the OR for the following indication(s): continuous blood pressure monitoring and blood sampling needed.    A 20 gauge (size), 1 and 3/4 inch (length), Arrow (type) catheter was placed, Seldinger technique used, into the left radial artery, secured by tape and Tegaderm.  Anesthesia type: General    Events:  patient tolerated procedure well with no complications and EBL < 5mL.2/25/2024 11:00 PM2/25/2024 11:03 PM  Resident/CRNA: Jae Castanon, APRN - CRNA  Preanesthetic Checklist  Completed: patient identified, IV checked, site marked, surgical/procedural consents, equipment checked, pre-op evaluation, timeout performed, oxygen available and monitors applied/VS acknowledged

## 2024-02-26 NOTE — PROGRESS NOTES
Patient admitted to [facility] with the following belongings:  None. The following belongings admitted with the patient, None, were sent home with the patient's family.

## 2024-02-26 NOTE — ED PROVIDER NOTES
hemorrhagic, CNS infection, drug intoxication, electrolyte derangement and dehydration.    EKG interpreted by me in detail on ED course.   Labs interpreted by me listed below:  CBC with no leukocytosis or significant anemia  CMP with stable renal and liver function, hypokalemia no electrolyte derangement  Trop 85  INR 1.6  Serum drug screen negative  Urine drug screen pending.  Point-of-care glucose 155    Stroke alert was called on patient's arrival. Please see Ed course for more information .  Spoke to neurological stroke team patient had an M1 occlusion they recommended TNKase (if patient not on blood thinners), explained to neurological stroke team patient is currently anonymous, no history available at this time. Phone call dropped several times during this conversation  [facility] neuro interventionalists Dr. Linares also consulted, he spoke to my attending and he recommended TNK as well, at the time information on patient identity available and through chart review patient has on med list eliquis listed, however there is no way of knowing if patient is compliant given her mentation at this time. Thus TNK was recommended.  Spoke to patient's mother and brother on the phone who agreed to TNK despite the risks involved including risks of bleeding and death.    Patient was given 25 mg of TNK and she was flown to [facility] for neurointervention  Attending did speak to the ER physician  Dr. Drew who accepted transfer.     Discussion With Other Professionals:  Consultation with neurology, neurointerventional, Dr. Drew.  The patient will be admitted for further treatment and evaluation for   1. Cerebrovascular accident (CVA), unspecified mechanism (HCC)      IP CONSULT TO NEUROLOGY    Please see ED course for more details:    ED Course as of 02/25/24 2301   Sun Feb 25, 2024 2119 Spoke to Neurology stroke team, patient has M1 occlusion.recommended tnk (if patient not on blood thinners)

## 2024-02-26 NOTE — PROGRESS NOTES
Speech Language Pathology  NAME:  Sheila Alanis  :  1975  DATE: 2024  ROOM:  3813/3813-A    Pt unavailable at 830 for Speech/ Language/ Cognitive Evaluation and Clinical Swallow Evaluation services due to:    Sleeping/ Lethargic- would not awaken for safe/ appropriate SLP assessment    Will re-attempt as appropriate. Thank you.

## 2024-02-26 NOTE — ANESTHESIA PRE PROCEDURE
Department of Anesthesiology  Preprocedure Note       Name:  Sheila Alanis   Age:  48 y.o.  :  1975                                          MRN:  [id number]         Date:  2024      Surgeon: Surgeon(s):  Benjamin Villagran MD    Procedure: * No procedures listed *    Medications prior to admission:   Prior to Admission medications    Not on File       Current medications:    No current facility-administered medications for this encounter.     No current outpatient medications on file.     Facility-Administered Medications Ordered in Other Encounters   Medication Dose Route Frequency Provider Last Rate Last Admin    niCARdipine (CARDENE) 25 mg in sodium chloride 0.9 % 250 mL infusion   IntraVENous Continuous PRN Mortimer, Chad, RN   Stopped at 24 231    propofol infusion   IntraVENous PRN Mortimer, Chad, RN   100 mg at 24 225    fentaNYL (SUBLIMAZE) injection   IntraVENous PRN Mortimer, Chad, RN   100 mcg at 24 225    esmolol (BREVIBLOC) injection   IntraVENous PRN Mortimer, Chad, RN   100 mg at 24 225    phenylephrine (MINERVA-SYNEPHRINE) injection   IntraVENous PRN Mortimer, Chad, RN   200 mcg at 24 225    succinylcholine (ANECTINE) injection   IntraVENous PRN Mortimer, Chad, RN   200 mg at 24 225    0.9 % sodium chloride infusion   IntraVENous Continuous PRN Mortimer, Chad, RN   New Bag at 24 2253    magnesium sulfate injection   IntraVENous PRN Mortimer, Chad, RN   2 g at 24 2305    rocuronium (ZEMURON) injection   IntraVENous PRN Mortimer, Chad, RN   30 mg at 24 2310    dilTIAZem injection   IntraVENous PRN Mortimer, Chad, RN   5 mg at 24 2327    dexAMETHasone (PF) (DECADRON) injection   IntraVENous PRN Mortimer, Chad, RN   10 mg at 24 2311    ondansetron (ZOFRAN) injection   IntraVENous PRN Mortimer, Chad, RN   4 mg at 24 231       Allergies:    Allergies   Allergen Reactions    Codeine     Fentanyl     Levaquin

## 2024-02-26 NOTE — PROGRESS NOTES
4 Eyes Skin Assessment     NAME:  Sheila Alanis  YOB: 1975  MEDICAL RECORD NUMBER:  [id number]    The patient is being assessed for  Admission    I agree that at least one RN has performed a thorough Head to Toe Skin Assessment on the patient. ALL assessment sites listed below have been assessed.      Areas assessed by both nurses:    Head, Face, Ears, Shoulders, Back, Chest, Arms, Elbows, Hands, Sacrum. Buttock, Coccyx, Ischium, Legs. Feet and Heels, and Under Medical Devices         Does the Patient have a Wound? No noted wound(s)       Jalen Prevention initiated by RN: Yes  Wound Care Orders initiated by RN: Yes    Pressure Injury (Stage 3,4, Unstageable, DTI, NWPT, and Complex wounds) if present, place Wound referral order by RN under : No    New Ostomies, if present place, Ostomy referral order under : No     Nurse 1 eSignature: Electronically signed by Britta Bradford RN on 2/26/24 at 6:28 AM EST    **SHARE this note so that the co-signing nurse can place an eSignature**    Nurse 2 eSignature: Electronically signed by Umm Perez RN on 2/26/24 at 6:29 AM EST

## 2024-02-26 NOTE — BRIEF OP NOTE
Neuroendovascular surgery brief post procedure note    Date of Service: ,     Patient Name: Sheila Alanis   : 1975  Medical record number:  [id number]    Procedure: Catheter-based cervical/cerebral angiogram with left ICA, left MCA and left ANGE thrombectomy.  Physician: Benjamin Villagran MD.  Assistant: Sara Peres RTR.  Access: Right common femoral artery.  Vessels injected: Left ICA, right ICA, left vertebral artery.  Hemostasis: 8 Kyr[place] VIP Angio-Seal device.  Anesthesia: General anesthesia with endotracheal intubation.  Specimens: None.   Blood loss: 50 cc.  Complications: None immediate.    Impression/Findings:   1.  Complete occlusion of the left internal carotid artery shortly after the carotid bulb with no visualization of intracranial left MCA and left ANGE.  This is now status post left ICA, left MCA and left ANGE thrombectomy using primary aspiration and solumbra technique x 5 attempts with complete recanalization of the left ICA and TICI-2b recanalization of the left MCA and left ANGE.  There are still cortical branches of the left MCA superior and inferior division M2 segments which have contrast extravasation secondary to intraluminal thrombus.  As a part of left anterior circulation thrombectomy, significant burden of intraluminal thrombus was extracted.  2.  Moderate tortuosity of bilateral extracranial ICA, out of proportion to patient's age.    Plan:  1. Neurovascular and vital signs checks: Q15 min x 4, Q30 min x 2, Q60 min x 2 and then per unit protocol.  2. Strict bed rest for 3 hours post groin closure.  3. Maintain SBP <160 mmHg.  4. Neuroimaging: Dual volume CT head at 7 AM on 2024.  24-hour neuroimaging post TNK.  5. STAT CT head for any neurological decline and please contact me immediately.   6. Antithrombotic agents: Hold off on any further blood thinners till follow-up neuroimaging.    Family updated.  Guarded prognosis discussed.    Benjamin Villagran

## 2024-02-26 NOTE — CONSULTS
INPATIENT CARDIOLOGY CONSULT     Reason for Consult:  AF  / Hx of HOCM (workup at )    Cardiologist: Dr. Ndiaye     Requesting Physician:  Dr. Alex Miller    Date of Consultation: 2/26/2024    HISTORY OF PRESENT ILLNESS:   Patient is a 48 year old WF known to Dr. Ndiaye through inpatient consultation November 2023.  She now follows with Cardiology at , Dr. Aguirre and Dr. Kuo.  She is currently being evaluated at  for possible HOCM (last OV 1/11/2024) --> per patient, she admits to diagnosis of HOCM with current LifeVest and plans for eventual ICD placement.     Patient has a known past medical history of chronically elevated troponin, nonobstructive CAD on cardiac cath  8/2023, NSVT/PVCs, severe LVH, poorly controlled HTN, Norvasc intolerance, HLD, T2DM, COPD with continued tobacco abuse, obesity, probable LUCA, reported hypercoagulable disorder (factor VIII deficiency versus antiphospholipid antibody syndrome), questionable TIA, arthritis, depression, chronic anemia with history of requiring transfusion in the past per patient, uterine fibroids with high-grade cervical dysplasia, medical non-compliance.     Recent Hospital Encounters:   ED visit [facility] 12/29/2023 for slurred speech and strokelike symptoms.  Noted of chest pain.  Wearing LifeVest.  No focal neurological deficits noted on exam.  Admission recommended, patient eloped.  ED visit [facility] 12/30/2023 for slurred speech and strokelike symptoms with associated chest pain.  Admission recommended, patient again left [place].   [facility] on 2/21/2024 with complaints of N/V, diarrhea.  She reports C. difficile exposure approximately one week ago, and eventually began to notice nausea, emesis, diarrhea and myalgias.  Admits to poor appetite, generalized fatigue, shortness of breath, productive cough and subjective fever/chills.  No note of chest discomfort, hematemesis, palpitations, dizziness, near-syncope or syncope, PND, orthopnea or

## 2024-02-26 NOTE — PLAN OF CARE
Problem: Safety - Medical Restraint  Goal: Remains free of injury from restraints (Restraint for Interference with Medical Device)  Description: INTERVENTIONS:  1. Determine that other, less restrictive measures have been tried or would not be effective before applying the restraint  2. Evaluate the patient's condition at the time of restraint application  3. Inform patient/family regarding the reason for restraint  4. Q2H: Monitor safety, psychosocial status, comfort, nutrition and hydration  Outcome: Progressing

## 2024-02-26 NOTE — PROGRESS NOTES
Patient arrived to the Surgical ICU from IR with hunt catheter intact and patent. Securing device applied. Hunt bag is hanging below the level of the bladder, safety clip attached to the bed sheet, tamper seal is intact, drainage bag is not on the floor, lack of dependent loop in tubing, and the drainage bag is less than half full.

## 2024-02-26 NOTE — PROGRESS NOTES
Physical Therapy  Physical Therapy Attempt    Name: Sheila Alanis  : 1975  MRN: [id number]      Date of Service: 2024  Chart reviewed.  Spoke with NP - pt is not appropriate for skilled PT at this time.  Will re-attempt as able.    Carlos Herrera, PT, DPT  [id number]

## 2024-02-26 NOTE — CONSULTS
ECHO ordered  Pulm:    1. Acute hypoxic respiratory failure- wean from O2 as able. Encourage deep breathing, IS, and pulmonary hygiene.   GI:    1. NPO- will need swallow screen when more awake.   2. Bowel regimen, Zofran prn.   Renal:    1. No acute issues. Trend BUN/Cr/UOP. Replace electrolytes prn.   Endocrine:    1. No acute issues. Maintain blood glucose < 180 in the ICU.  MSK:   1. R sided weakness- PT/OT as able.   Heme:    1. Possible hypercoagulable disorder- labs ordered. Hold Eliquis for now.  ID:    1. No acute issues. No indications for antibiotics.    Pain/Analgesia: Tylenol  Bowel regimen: Glycolax, Senokot  DVT proph:  SCDs  GI proph:  Not indicated   Glucose protocol: Not indicated    Mouth/eye care: As needed per patient  Re[place]aga:   present, 2/25/24. Keep in place for fluid monitoring.  CVC sites:  L radial arterial line 2/25/24    Ancillary consults: Cardiology, internal medicine, interventional radiology  Family Update: As available    Disposition: CVICU    Wendi Stallworth MD  Resident, PGY-3  2/26/2024  2:18 AM

## 2024-02-26 NOTE — PLAN OF CARE
Problem: Chronic Conditions and Co-morbidities  Goal: Patient's chronic conditions and co-morbidity symptoms are monitored and maintained or improved  Outcome: Progressing     Problem: Discharge Planning  Goal: Discharge to home or other facility with appropriate resources  Outcome: Progressing     Problem: Pain  Goal: Verbalizes/displays adequate comfort level or baseline comfort level  Outcome: Progressing     Problem: Safety - Adult  Goal: Free from fall injury  Outcome: Progressing     Problem: Skin/Tissue Integrity  Goal: Absence of new skin breakdown  Description: 1.  Monitor for areas of redness and/or skin breakdown  2.  Assess vascular access sites hourly  3.  Every 4-6 hours minimum:  Change oxygen saturation probe site  4.  Every 4-6 hours:  If on nasal continuous positive airway pressure, respiratory therapy assess nares and determine need for appliance change or resting period.  Outcome: Progressing     Problem: Risk for Elopement  Goal: Patient will not exit the unit/facility without proper excort  Outcome: Progressing  Flowsheets (Taken 2/26/2024 0800)  Nursing Interventions for Elopement Risk:   Collaborate with family members/caregivers to mitigate the elopement risk   Collaborate with treatment team for drug withdrawal symptoms treatment   Collaborate with treatment team for nicotine replacement

## 2024-02-26 NOTE — ED PROVIDER NOTES
[facility] EMERGENCY DEPARTMENT  EMERGENCY DEPARTMENT ENCOUNTER        Pt Name: Sheila Alanis  MRN: [id number]  Birthdate 1975  Date of evaluation: 2/25/2024  Provider: Ventura Drew DO  PCP: Potocki, Joseph A, DO  Note Started: 10:51 PM EST 2/25/24    CHIEF COMPLAINT       Chief Complaint   Patient presents with    Cerebrovascular Accident     Transfer from [facility]  Large vessel occlusion LKW 2000, TNK given at [address] [facility]        HISTORY OF PRESENT ILLNESS: 1 or more Elements   History From: EMS    Limitations to history : Altered Mental Status    Sheila Alanis is a 48 y.o. female who presents to the emergency department as a transfer from [facility] for a CVA.  Patient reports the patient's boyfriend came home and found her altered with strokelike symptoms around 8 PM.  Patient was taken to [facility] and found to have strokelike symptoms.  Patient received Narcan there.  Patient underwent stroke workup And was found to have a large vessel occlusion and transferred to our facility for thrombectomy.     Nursing Notes were all reviewed and agreed with or any disagreements were addressed in the HPI.      REVIEW OF EXTERNAL NOTE :       PDMP Monitoring:    Last PDMP Jose Enrique as Reviewed:  Review User Review Instant Review Result            Urine Drug Screenings (1 yr)       URINE DRUG SCREEN  Collected: 2/25/2024  9:40 PM (Final result)              SERUM DRUG SCREEN  Collected: 2/25/2024  9:00 PM (Preliminary result)                  Medication Contract and Consent for Opioid Use Documents Filed        No documents found                      REVIEW OF SYSTEMS :           Positives and Pertinent negatives as per HPI.     SURGICAL HISTORY   No past surgical history on file.    CURRENTMEDICATIONS       Previous Medications    No medications on file       ALLERGIES     Codeine, Fentanyl, Levaquin [levofloxacin], Penicillins, and Sulfa

## 2024-02-26 NOTE — PROGRESS NOTES
Occupational Therapy    Date:2024  Patient Name: Sheila Alanis  MRN: [id number]  : 1975  Room: [facility]-A     OT orders received and chart reviewed. OT eval on hold at this time - pt not medically appropriate.  OT will follow and re-attempt eval as appropriate at a later time/date.    Simon Mcgrath OTR/L; [id number]

## 2024-02-26 NOTE — ED NOTES
Dr Hewitt made aware again of elevating blood pressure. No further orders at this time.    Tranexamic Acid Counseling:  Patient advised of the small risk of bleeding problems with tranexamic acid. They were also instructed to call if they developed any nausea, vomiting or diarrhea. All of the patient's questions and concerns were addressed.

## 2024-02-26 NOTE — FLOWSHEET NOTE
EDDIE Ndiaye notified that cardizem is @15mg/hr and has not helped with rate control; will consult EP.

## 2024-02-26 NOTE — PROGRESS NOTES
0200 - Critical care at bedside and aware of patient being responsive only to pain at this time with non-purposeful movements of the left arm and leg.     0230 - Dr. Villagran at bedside and aware of patient condition. Verbal orders to keep SBP under 160 and HR under 110. No orders placed in at this time, but Dr. Villagran requesting input from CC.      0250 - critical care notified of patient HR.     0330 - Patient HR still tachy. EKG obtained. A-fib RVR. 5 mg of lopressor ordered.     0345 -  CC updated. Another dose of lopressor 5 mg ordered for -140's. Updated on current BP.     0350 - CC updated that lopressor ineffective. Lopressor 5 mg IV ordered.     0415 - Patient continues to sustain HR in the 130-140 with /82. Verbal orders given to start Cardizem drip to reach HR less than 110.     0530 - patient still not waking up or following commands. Patient assisted to [facility]. No complications.     0630 - Radiology department calling to alert of critical results to [facility]. Dr. Gallardo connected.

## 2024-02-26 NOTE — PROCEDURES
NEUROINTERVENTION PROCEDURE NOTE    PATIENT NAME: Sheila Alanis  MRN: [id number]  : 1975  DATE OF PROCEDURE: 24    Stroke Metrics  NIHSS prior to procedure: 30  IV TNK Administered: [x] Yes  []  No  Consent obtained: [x] Yes  []  No  by Dr Villagran from family Hamida Kelly (Mother)  Pedal Pulses checked: +2 bilaterally pre-procedure    Vitals completed per anesthesia    Neurointerventionalist: Benjamin Villagran MD  1st assistant Sara Peres      Time Event Device Notes   2300 Access site puncture   Location: right femoral       2316 (ICA/MCA) 1st pass suction (manual) TICI Reperfusion stgstrstastdstest:st st1st 2330 (ICA/MCA) 2nd pass stent retriever and suction (manual) TICI Reperfusion grade:2A    2355 (MCA) 3rd pass stent retriever and suction TICI Reperfusion grade:2A/ TICI reperfusion grade: 0 (0012)   0024 (MCA)   4th pass stent retriever and suction TICI Reperfusion grade: 2B   0042 (Left ANGE) 5th pass Suction (Manual)  TICI Reperfusion grade: 2B   0120 Access site closure  Sheath pulled and  8F Angioseal used to close arterial puncture. Puncture site cleansed and dry dressing applied. No bleeding, swelling or complications noted, no change in pulses.      IA tPA adminstered: [] Yes  [x]  No       Time Event Dose     IA tPA administered      IA tPA administered      IA tPA administered       0125 Post-procedure NIHSS unable to assess due to anesthesia/sedation and initial site assessment: site Right groin WNL, no bleeding or hematoma noted, dressing dry and intact. +2 bilateral pedal pulses.    0130 Post-procedure NIHSS unable to assess due to anesthesia/sedation and initial site assessment: site Right groin WNL, no bleeding or hematoma noted, dressing dry and intact. +2 bilateral pedal pulses.    0140 Patient transported to [facility]  and handoff report given to bedside RN    Family updated: [x] Yes  []  No    Blood pressure parameters: SBP <160    Any additional follow up scans:  CT around 7 AM

## 2024-02-26 NOTE — CONSULTS
VASCULAR NEUROLOGY/NEUROENDOVASCULAR SURGERY CONSULT NOTE    Consult requested by: ED physician at [facility].  Attending: Jacquelyn Kumar MD   Reason for Consultation: Right hemiplegia, aphasia, left MCA occlusion.    Patient: Sheila Alanis   : 1975   MRN: [id number]   Date of Service: 2024    History of Present Illness:  History was obtained by talking to the emergency department physician at [facility], patient's mother and brother over the phone and personal review of medical records.    Ms. Alanis is a 48-year-old right-handed  woman with known vascular risk factors of hypertension, chronic everyday smoking and some kind of heart ailment (not exactly known at the time of writing this note) for which she was supposed to get a cardiac MRI on 2024 and possibly a pacemaker-defibrillator.  Unclear if she is on any antithrombotic medications at home.  On 2024 she was at [facility] and was found to have A-fib with RVR.  The mother endorses she has had on and off A-fib for close to an year now.  Regardless, based on chart review, it seems she was prescribed Eliquis 5 mg twice daily but unfortunately she signed out of the hospital AMA.    She was with her boyfriend today when all of a sudden around 8 PM, he noticed that as she tried to get up, she fell down on the ground towards the right side and was not able to move the right side of the body.  She also had a fixed gaze deviation towards the left side.  She was evaluated at the outside hospital as code stroke and I got alerted of an LVO via [employer] images.  On my personal review of images, there was no acute intracranial abnormality on head CT and vascular imaging showed complete occlusion of the left ICA shortly after carotid bulb with no intracranial opacification.  There was some opacification of the left ANGE and left MCA likely via cross flow but there was significant absent vasculature

## 2024-02-26 NOTE — H&P
adjustment of the mA/kV was utilized to reduce the radiation dose to as low as reasonably achievable.; CTA of the neck was performed with the administration of intravenous contrast. Multiplanar reformatted images are provided for review.  MIP images are provided for review. Stenosis of the internal carotid arteries measured using NASCET criteria. Automated exposure control, iterative reconstruction, and/or weight based adjustment of the mA/kV was utilized to reduce the radiation dose to as low as reasonably achievable.; CT of the head was performed without the administration of intravenous contrast. Automated exposure control, iterative reconstruction, and/or weight based adjustment of the mA/kV was utilized to reduce the radiation dose to as low as reasonably achievable. Noncontrast CT of the head with reconstructed 2-D images are also provided for review. COMPARISON: None. HISTORY: ORDERING SYSTEM PROVIDED HISTORY: TECHNOLOGIST PROVIDED HISTORY: Has a \"code stroke\" or \"stroke alert\" been called?->Yes Decision Support Exception - unselect if not a suspected or confirmed emergency medical condition->Emergency Medical Condition (MA); ORDERING SYSTEM PROVIDED HISTORY: CVA TECHNOLOGIST PROVIDED HISTORY: Has a \"code stroke\" or \"stroke alert\" been called?->Yes Reason for exam:->CVA Decision Support Exception - unselect if not a suspected or confirmed emergency medical condition->Emergency Medical Condition (MA); ORDERING SYSTEM PROVIDED HISTORY: stroke TECHNOLOGIST PROVIDED HISTORY: Reason for exam:->stroke Decision Support Exception - unselect if not a suspected or confirmed emergency medical condition->Emergency Medical Condition (MA) FINDINGS: CT HEAD: BRAIN/VENTRICLES:  No acute intracranial hemorrhage or extraaxial fluid collection. Grey-white differentiation is maintained.  No evidence of mass, mass effect or midline shift.  No evidence of hydrocephalus. ORBITS: The visualized portion of the orbits demonstrate no  distribution, however the left MCA flow appears less than the right MCA flow. 3. Partially imaged right upper lobe consolidation, concerning for aspiration/pneumonia. 4. Nondiagnostic CT perfusion. CT head without contrast findings were communicated to Dr. Zuly Palm by the CORE team on 2/25/2024 at 10:18 pm. CTA head and neck results were called by Dr. Phil Potts to Dr. Stevie Hewitt on 2/25/2024 at 22:30.     CTA HEAD W CONTRAST    Result Date: 2/25/2024  EXAMINATION: CTA OF THE HEAD WITH CONTRAST; CTA OF THE NECK; CT OF THE HEAD WITHOUT CONTRAST; CTA OF THE HEAD WITH CONTRAST WITH PERFUSION 2/25/2024 8:59 pm: TECHNIQUE: CTA of the head/brain was performed with the administration of intravenous contrast. Multiplanar reformatted images are provided for review.  MIP images are provided for review. Automated exposure control, iterative reconstruction, and/or weight based adjustment of the mA/kV was utilized to reduce the radiation dose to as low as reasonably achievable.; CTA of the neck was performed with the administration of intravenous contrast. Multiplanar reformatted images are provided for review.  MIP images are provided for review. Stenosis of the internal carotid arteries measured using NASCET criteria. Automated exposure control, iterative reconstruction, and/or weight based adjustment of the mA/kV was utilized to reduce the radiation dose to as low as reasonably achievable.; CT of the head was performed without the administration of intravenous contrast. Automated exposure control, iterative reconstruction, and/or weight based adjustment of the mA/kV was utilized to reduce the radiation dose to as low as reasonably achievable. Noncontrast CT of the head with reconstructed 2-D images are also provided for review. COMPARISON: None. HISTORY: ORDERING SYSTEM PROVIDED HISTORY: TECHNOLOGIST PROVIDED HISTORY: Has a \"code stroke\" or \"stroke alert\" been called?->Yes Decision Support Exception - unselect if not a

## 2024-02-26 NOTE — PROGRESS NOTES
0145 - Patient admitted from IR with IV fluids, heart monitor. At this time, patient is only responsive to pain.

## 2024-02-26 NOTE — PROGRESS NOTES
Intensive Care Unit  Critical Care Consult  Daily Progress Note   2/26/2024    Date of Admission: 2/26  Date of Admission to ICU: 2/26    EVENTS:   Presented to [facility] with right side weakness. Found to have L MCA occlusion. She has PMH of factor 8 deficiency. She was treated with TNK and [place] [facility] She was taken to IR for thrombectomy of L ICA/ANGE/MCA. Admitted to [facility]. She was found to be in new onset a fib, started on diltiazem, also started on nicardipine gtt.  She had a repeat CT this morning which revealed hemorrhagic conversion.  On exam, she is lethargic but does open eyes to pain and moves extremities.   She was admitted to [facility] 2/22 for N/V/D,sepsis d/t pneumonia, and a fib. She signed out AMA. She has been in a fib for a year, per her mom.    PHYSICAL EXAM:    BP 97/81   Pulse (!) 104   Temp 97.7 °F (36.5 °C) (Axillary)   Resp 22   Ht 1.727 m (5' 8\")   Wt 113.5 kg (250 lb 3.6 oz)   SpO2 93%   BMI 38.05 kg/m²       Pain Description: none    GCS:    2 - Opens eyes with pain   5 - Pushes away noxious stimulus  3 - Talks, but nonsensical    Pupil size: Left 3 mm  Right 3 mm  Pupil reaction: Yes  Moves extremities to pain, does not follow commands    CONSTITUTIONAL: no acute distress, lying in hospital bed, appears comfortable.  NEUROLOGIC: PERRL  CARDIOVASCULAR: S1 S2, regular rate, regular rhythm, no murmur/gallop/rub. Monitor: a fib  PULMONARY: Breath sounds clear. No rhonchi/rales/wheezes. No use of accessory muscles, no retractions.  RENAL: clear yellow urine. [place]   ABDOMEN: soft, nontender, nondistended, nontympanic, normal bowel sounds   MUSCULOSKELETAL: moves all extremities purposefully   SKIN/EXTREMITIES: no rashes/ecchymosis, no edema/clubbing, warm/dry, good capillary refill       LINES:   PIV   A-line       History reviewed. No pertinent past medical history.    ASSESSMENT/PLAN:     Principal Problem:    Cerebrovascular accident (CVA) ([facility])  Resolved Problems:    * No resolved

## 2024-02-26 NOTE — PROGRESS NOTES
[facility] Hospitalist Progress Note    Synopsis: The patient is a 48-year-old female with a past medical history of hokum, Factor VIII deficiency, atrial fibrillation, DVT and TIAs who presented to [facility] for right-sided weakness.  Workup showed a left MCA stroke.  Patient was given TNK.  She was subsequently transferred to [facility] main campus.  Neuroendovascular surgery performed a thrombectomy.  Patient had a repeat CT of the head which showed a left putamen hemorrhage.  Of note patient also has A-fib RVR.  Patient is currently under ICU care.  She is on a Cardene drip.  Cardiology has also been consulted.    Subjective:  Patient is being followed for Atrial fibrillation with RVR ([facility]) [I48.91]  Cerebrovascular accident (CVA), unspecified mechanism (HCC) [I63.9]  Acute cerebrovascular accident (CVA) due to ischemia ([facility]) [I63.9]   Intermittently opens eyes to pain.  Was able to lift her hand for me but otherwise has been unable to fully examine extent of her neurologic deficit    ROS: denies fever, chills, cp, sob, n/v, HA unless stated above.      polyethylene glycol  17 g Oral Daily    sennosides-docusate sodium  2 tablet Oral Daily    atorvastatin  40 mg Oral Nightly    carvedilol  25 mg Oral BID    hydrALAZINE  75 mg Oral 3 times per day    levETIRAcetam  500 mg IntraVENous Q12H    sodium chloride flush  5-40 mL IntraVENous BID     perflutren lipid microspheres, 1.5 mL, ONCE PRN  hydrALAZINE, 10 mg, Q30 Min PRN  labetalol, 10 mg, Q1H PRN  acetaminophen, 650 mg, Q4H PRN  ondansetron, 4 mg, Q6H PRN  sodium chloride flush, 5-40 mL, PRN         Objective:    /82   Pulse (!) 128   Temp 97.1 °F (36.2 °C) (Axillary)   Resp 18   Ht 1.727 m (5' 8\")   Wt 113.5 kg (250 lb 3.6 oz)   SpO2 95%   BMI 38.05 kg/m²     General Appearance: Lethargic.  Opens eyes to pain  Skin: warm and dry  Head: normocephalic and atraumatic  Eyes: pupils equal, round, and reactive to light,

## 2024-02-26 NOTE — PROGRESS NOTES
Subjective:      Sheila Alanis post op follow up , thrombectomy by  on 2/25-2/26 overnight    Patient is clinically stable     Review of Systems  Pertinent items are noted in HPI.      Objective:      /80   Pulse (!) 112   Temp 97.1 °F (36.2 °C) (Axillary)   Resp 14   Ht 1.727 m (5' 8\")   Wt 113.5 kg (250 lb 3.6 oz)   SpO2 98%   BMI 38.05 kg/m²     NIH Stroke Scale/Score at time of initial evaluation:    1A: Level of Consciousness 2 - not alert, requires repeated stimulation to attend, or is obtunded and requires strong or painful stimulation to make movements (not stereotyped)    1B: Ask Month and Age 2 - answers neither question correctly   1C: Tell Patient To Open and Close Eyes, then Hand  Squeeze 2 - performs neither task correctly   2: Test Horizontal Extraocular Movements 0 - normal   3: Test Visual Fields 0 - no visual loss   4: Test Facial Palsy 2 - partial paralysis (total or near total paralysis of the lower face)   5A: Test Left Arm Motor Drift 0 - no drift, limb holds 90 (or 45) degrees for full 10 seconds   5B: Test Right Arm Motor Drift 4 - no movement   6A: Test Left Leg Motor Drift 2 - some effort against gravity; leg falls to bed by 5 seconds but has some effort against gravity   6B: Test Right Leg Motor Drift 4 - no movement   7: Test Limb Ataxia (FNF/Heel-Shin) 0 - absent   8: Test Sensation 2 - severe to total sensory loss; patient is not aware of being touched in face, arm, leg   9: Test Language/Aphasia 3 - mute, global aphasia; no usable speech or auditory comprehension   10: Test Dysarthria 2 - severe; patient speech is so slurred as to be unintelligible in the absence of or our of proportion to any dysphagia, or is mute/anarthric    11: Test Extinction/Inattention 1 - visual, tactile, auditory, spatial or personal inattention or extinction to bilateral simultaneous stimulation in one of the sensory modalities    Total 22              Lab Review  Lab Results

## 2024-02-26 NOTE — ANESTHESIA POSTPROCEDURE EVALUATION
Department of Anesthesiology  Postprocedure Note    Patient: Sheila Alanis  MRN: [id number]  YOB: 1975  Date of evaluation: 2/26/2024    Procedure Summary       Date: 02/25/24 Room / Location: [facility] Special Procedures; [facility] Radiology    Anesthesia Start: 2253 Anesthesia Stop: 02/26/24 0149    Procedure: IR MECHANICAL ART THROMBECTOMY INTRACRANIAL Diagnosis: (LT MCA OCCLUSION)    Scheduled Providers: Benjamin Villagran MD Responsible Provider: Ventura Sotelo MD    Anesthesia Type: General ASA Status: 4 - Emergent            Anesthesia Type: General    Rhonda Phase I:      Rhonda Phase II:      Anesthesia Post Evaluation    No notable events documented.

## 2024-02-26 NOTE — CARE COORDINATION
2/26 Care Coordination: Pt presents altered mental status and strokelike symptoms.Dx Ischemic Stroke.CT shows evidence of large MCA territory infarction, TNK given. Pt transferred to [facility] from [facility] thrombectomy Admit to [facility] post op. continue Cardene gtt. On O2 6liters. .  patient still not waking up or following commands. PT/OT/SLP on Hold. With Current status unclear on discharge needs. CM/SW will continue to follow for discharge planning.   Del AGUDELO,RN--BC  [phone]

## 2024-02-27 ENCOUNTER — APPOINTMENT (OUTPATIENT)
Dept: CT IMAGING | Age: 49
End: 2024-02-27
Payer: COMMERCIAL

## 2024-02-27 ENCOUNTER — APPOINTMENT (OUTPATIENT)
Dept: GENERAL RADIOLOGY | Age: 49
End: 2024-02-27
Payer: COMMERCIAL

## 2024-02-27 ENCOUNTER — APPOINTMENT (OUTPATIENT)
Dept: MRI IMAGING | Age: 49
End: 2024-02-27
Payer: COMMERCIAL

## 2024-02-27 PROBLEM — I10 PRIMARY HYPERTENSION: Status: ACTIVE | Noted: 2024-02-27

## 2024-02-27 PROBLEM — I42.2 HYPERTROPHIC CARDIOMYOPATHY (HCC): Status: ACTIVE | Noted: 2024-02-27

## 2024-02-27 PROBLEM — I63.232 CEREBROVASCULAR ACCIDENT (CVA) DUE TO STENOSIS OF LEFT CAROTID ARTERY (HCC): Status: ACTIVE | Noted: 2024-02-27

## 2024-02-27 PROBLEM — I63.9 CEREBROVASCULAR ACCIDENT (CVA) (HCC): Status: ACTIVE | Noted: 2024-02-27

## 2024-02-27 PROBLEM — Z86.2 HISTORY OF BLEEDING DISORDER: Status: ACTIVE | Noted: 2024-02-27

## 2024-02-27 LAB
ALBUMIN SERPL-MCNC: 3.3 G/DL (ref 3.5–5.2)
ALP SERPL-CCNC: 80 U/L (ref 35–104)
ALT SERPL-CCNC: 36 U/L (ref 0–32)
ANION GAP SERPL CALCULATED.3IONS-SCNC: 8 MMOL/L (ref 7–16)
AST SERPL-CCNC: 18 U/L (ref 0–31)
BASOPHILS # BLD: 0.02 K/UL (ref 0–0.2)
BASOPHILS NFR BLD: 0 % (ref 0–2)
BILIRUB SERPL-MCNC: 0.5 MG/DL (ref 0–1.2)
BUN SERPL-MCNC: 25 MG/DL (ref 6–20)
CA-I BLD-SCNC: 1.14 MMOL/L (ref 1.15–1.33)
CALCIUM SERPL-MCNC: 8.2 MG/DL (ref 8.6–10.2)
CHLORIDE SERPL-SCNC: 108 MMOL/L (ref 98–107)
CO2 SERPL-SCNC: 22 MMOL/L (ref 22–29)
CREAT SERPL-MCNC: 0.7 MG/DL (ref 0.5–1)
EKG ATRIAL RATE: 106 BPM
EKG Q-T INTERVAL: 308 MS
EKG QRS DURATION: 106 MS
EKG QTC CALCULATION (BAZETT): 440 MS
EKG R AXIS: 14 DEGREES
EKG T AXIS: -139 DEGREES
EKG VENTRICULAR RATE: 123 BPM
EOSINOPHIL # BLD: 0 K/UL (ref 0.05–0.5)
EOSINOPHILS RELATIVE PERCENT: 0 % (ref 0–6)
ERYTHROCYTE [DISTWIDTH] IN BLOOD BY AUTOMATED COUNT: 18.2 % (ref 11.5–15)
FACTOR X ACTIVITY: 69 % (ref 50–150)
FACTOR XI ACTIVITY: 92 % (ref 50–150)
GFR SERPL CREATININE-BSD FRML MDRD: >60 ML/MIN/1.73M2
GLUCOSE SERPL-MCNC: 168 MG/DL (ref 74–99)
HCT VFR BLD AUTO: 33.4 % (ref 34–48)
HGB BLD-MCNC: 9.8 G/DL (ref 11.5–15.5)
IMM GRANULOCYTES # BLD AUTO: 0.14 K/UL (ref 0–0.58)
IMM GRANULOCYTES NFR BLD: 1 % (ref 0–5)
LYMPHOCYTES NFR BLD: 1.37 K/UL (ref 1.5–4)
LYMPHOCYTES RELATIVE PERCENT: 9 % (ref 20–42)
MAGNESIUM SERPL-MCNC: 2.1 MG/DL (ref 1.6–2.6)
MCH RBC QN AUTO: 22.7 PG (ref 26–35)
MCHC RBC AUTO-ENTMCNC: 29.3 G/DL (ref 32–34.5)
MCV RBC AUTO: 77.3 FL (ref 80–99.9)
MICROORGANISM SPEC CULT: NO GROWTH
MICROORGANISM SPEC CULT: NORMAL
MICROORGANISM SPEC CULT: NORMAL
MONOCYTES NFR BLD: 0.83 K/UL (ref 0.1–0.95)
MONOCYTES NFR BLD: 6 % (ref 2–12)
NEUTROPHILS NFR BLD: 84 % (ref 43–80)
NEUTS SEG NFR BLD: 12.27 K/UL (ref 1.8–7.3)
PHOSPHATE SERPL-MCNC: 2.2 MG/DL (ref 2.5–4.5)
PLATELET # BLD AUTO: 328 K/UL (ref 130–450)
PMV BLD AUTO: 10 FL (ref 7–12)
POTASSIUM SERPL-SCNC: 3.5 MMOL/L (ref 3.5–5)
PROT SERPL-MCNC: 5.6 G/DL (ref 6.4–8.3)
PROTEIN S ACTIVITY: 106 % (ref 59–130)
RBC # BLD AUTO: 4.32 M/UL (ref 3.5–5.5)
SERVICE CMNT-IMP: NORMAL
SERVICE CMNT-IMP: NORMAL
SODIUM SERPL-SCNC: 138 MMOL/L (ref 132–146)
SODIUM SERPL-SCNC: 145 MMOL/L (ref 132–146)
SPECIMEN DESCRIPTION: NORMAL
WBC OTHER # BLD: 14.6 K/UL (ref 4.5–11.5)

## 2024-02-27 PROCEDURE — 71045 X-RAY EXAM CHEST 1 VIEW: CPT

## 2024-02-27 PROCEDURE — 36556 INSERT NON-TUNNEL CV CATH: CPT | Performed by: SURGERY

## 2024-02-27 PROCEDURE — 85025 COMPLETE CBC W/AUTO DIFF WBC: CPT

## 2024-02-27 PROCEDURE — 82330 ASSAY OF CALCIUM: CPT

## 2024-02-27 PROCEDURE — 37799 UNLISTED PX VASCULAR SURGERY: CPT

## 2024-02-27 PROCEDURE — 99291 CRITICAL CARE FIRST HOUR: CPT | Performed by: SURGERY

## 2024-02-27 PROCEDURE — 84295 ASSAY OF SERUM SODIUM: CPT

## 2024-02-27 PROCEDURE — 76937 US GUIDE VASCULAR ACCESS: CPT | Performed by: SURGERY

## 2024-02-27 PROCEDURE — 2700000000 HC OXYGEN THERAPY PER DAY

## 2024-02-27 PROCEDURE — 6360000002 HC RX W HCPCS: Performed by: NURSE PRACTITIONER

## 2024-02-27 PROCEDURE — 2000000000 HC ICU R&B

## 2024-02-27 PROCEDURE — 70551 MRI BRAIN STEM W/O DYE: CPT

## 2024-02-27 PROCEDURE — 36556 INSERT NON-TUNNEL CV CATH: CPT

## 2024-02-27 PROCEDURE — 70450 CT HEAD/BRAIN W/O DYE: CPT

## 2024-02-27 PROCEDURE — A4216 STERILE WATER/SALINE, 10 ML: HCPCS | Performed by: CLINICAL NURSE SPECIALIST

## 2024-02-27 PROCEDURE — 6370000000 HC RX 637 (ALT 250 FOR IP): Performed by: NURSE PRACTITIONER

## 2024-02-27 PROCEDURE — 2580000003 HC RX 258: Performed by: NURSE PRACTITIONER

## 2024-02-27 PROCEDURE — 6360000002 HC RX W HCPCS: Performed by: CLINICAL NURSE SPECIALIST

## 2024-02-27 PROCEDURE — 80053 COMPREHEN METABOLIC PANEL: CPT

## 2024-02-27 PROCEDURE — 2580000003 HC RX 258: Performed by: INTERNAL MEDICINE

## 2024-02-27 PROCEDURE — 93010 ELECTROCARDIOGRAM REPORT: CPT | Performed by: INTERNAL MEDICINE

## 2024-02-27 PROCEDURE — 6370000000 HC RX 637 (ALT 250 FOR IP): Performed by: STUDENT IN AN ORGANIZED HEALTH CARE EDUCATION/TRAINING PROGRAM

## 2024-02-27 PROCEDURE — 2580000003 HC RX 258: Performed by: CLINICAL NURSE SPECIALIST

## 2024-02-27 PROCEDURE — 6360000002 HC RX W HCPCS: Performed by: INTERNAL MEDICINE

## 2024-02-27 PROCEDURE — 84100 ASSAY OF PHOSPHORUS: CPT

## 2024-02-27 PROCEDURE — 36592 COLLECT BLOOD FROM PICC: CPT

## 2024-02-27 PROCEDURE — 2580000003 HC RX 258: Performed by: STUDENT IN AN ORGANIZED HEALTH CARE EDUCATION/TRAINING PROGRAM

## 2024-02-27 PROCEDURE — 2500000003 HC RX 250 WO HCPCS: Performed by: STUDENT IN AN ORGANIZED HEALTH CARE EDUCATION/TRAINING PROGRAM

## 2024-02-27 PROCEDURE — 99233 SBSQ HOSP IP/OBS HIGH 50: CPT | Performed by: PSYCHIATRY & NEUROLOGY

## 2024-02-27 PROCEDURE — 2500000003 HC RX 250 WO HCPCS: Performed by: CLINICAL NURSE SPECIALIST

## 2024-02-27 PROCEDURE — 99233 SBSQ HOSP IP/OBS HIGH 50: CPT | Performed by: INTERNAL MEDICINE

## 2024-02-27 PROCEDURE — 83735 ASSAY OF MAGNESIUM: CPT

## 2024-02-27 PROCEDURE — 02HV33Z INSERTION OF INFUSION DEVICE INTO SUPERIOR VENA CAVA, PERCUTANEOUS APPROACH: ICD-10-PCS

## 2024-02-27 RX ORDER — CALCIUM GLUCONATE 10 MG/ML
1000 INJECTION, SOLUTION INTRAVENOUS ONCE
Status: COMPLETED | OUTPATIENT
Start: 2024-02-27 | End: 2024-02-27

## 2024-02-27 RX ADMIN — FAMOTIDINE 20 MG: 10 INJECTION, SOLUTION INTRAVENOUS at 19:57

## 2024-02-27 RX ADMIN — SODIUM CHLORIDE, PRESERVATIVE FREE 10 ML: 5 INJECTION INTRAVENOUS at 19:57

## 2024-02-27 RX ADMIN — AMIODARONE HYDROCHLORIDE 0.5 MG/MIN: 50 INJECTION, SOLUTION INTRAVENOUS at 18:24

## 2024-02-27 RX ADMIN — LEVETIRACETAM 500 MG: 100 INJECTION INTRAVENOUS at 08:38

## 2024-02-27 RX ADMIN — HYDRALAZINE HYDROCHLORIDE 75 MG: 50 TABLET ORAL at 22:36

## 2024-02-27 RX ADMIN — SENNOSIDES AND DOCUSATE SODIUM 2 TABLET: 8.6; 5 TABLET ORAL at 08:38

## 2024-02-27 RX ADMIN — CALCIUM GLUCONATE 1000 MG: 10 INJECTION, SOLUTION INTRAVENOUS at 08:53

## 2024-02-27 RX ADMIN — LEVETIRACETAM 500 MG: 100 INJECTION INTRAVENOUS at 19:57

## 2024-02-27 RX ADMIN — POTASSIUM BICARBONATE 20 MEQ: 782 TABLET, EFFERVESCENT ORAL at 09:00

## 2024-02-27 RX ADMIN — HYDRALAZINE HYDROCHLORIDE 75 MG: 50 TABLET ORAL at 14:24

## 2024-02-27 RX ADMIN — CARVEDILOL 25 MG: 25 TABLET, FILM COATED ORAL at 08:38

## 2024-02-27 RX ADMIN — POLYETHYLENE GLYCOL 3350 17 G: 17 POWDER, FOR SOLUTION ORAL at 08:38

## 2024-02-27 RX ADMIN — FAMOTIDINE 20 MG: 10 INJECTION, SOLUTION INTRAVENOUS at 08:39

## 2024-02-27 RX ADMIN — SODIUM CHLORIDE 5 MG/HR: 900 INJECTION, SOLUTION INTRAVENOUS at 23:07

## 2024-02-27 RX ADMIN — CARVEDILOL 25 MG: 25 TABLET, FILM COATED ORAL at 19:57

## 2024-02-27 RX ADMIN — SODIUM ACETATE: 164 INJECTION, SOLUTION, CONCENTRATE INTRAVENOUS at 11:36

## 2024-02-27 RX ADMIN — ATORVASTATIN CALCIUM 40 MG: 40 TABLET, FILM COATED ORAL at 19:57

## 2024-02-27 RX ADMIN — AMIODARONE HYDROCHLORIDE 1 MG/MIN: 50 INJECTION, SOLUTION INTRAVENOUS at 06:53

## 2024-02-27 ASSESSMENT — PAIN SCALES - GENERAL
PAINLEVEL_OUTOF10: 0
PAINLEVEL_OUTOF10: 1
PAINLEVEL_OUTOF10: 1
PAINLEVEL_OUTOF10: 0

## 2024-02-27 NOTE — PROGRESS NOTES
Speech Language Pathology  NAME:  Sheila Alanis  :  1975  DATE: 2024  ROOM:  3813/3813-A    Pt unavailable at 815 for Speech/ Language/ Cognitive Evaluation and Clinical Swallow Evaluation services due to:    HOLD per RN, Pt is not appropriate for participation in SLP assessment.     Will re-attempt as appropriate. Thank you.

## 2024-02-27 NOTE — PROGRESS NOTES
Subjective:      Sheila Alanis post op follow up , thrombectomy by  on 2/25-2/26 overnight    Patient is clinically stable     Review of Systems  Pertinent items are noted in HPI.      Objective:      BP (!) 128/111   Pulse (!) 103   Temp 98.1 °F (36.7 °C) (Temporal)   Resp 12   Ht 1.727 m (5' 8\")   Wt 113.5 kg (250 lb 3.6 oz)   SpO2 94%   BMI 38.05 kg/m²     NIH Stroke Scale/Score at time of initial evaluation:    1A: Level of Consciousness 2 - not alert, requires repeated stimulation to attend, or is obtunded and requires strong or painful stimulation to make movements (not stereotyped)    1B: Ask Month and Age 2 - answers neither question correctly   1C: Tell Patient To Open and Close Eyes, then Hand  Squeeze 2 - performs neither task correctly   2: Test Horizontal Extraocular Movements 2 - forced deviation, or total gaze paresis not overcome by oculocephalic maneuver   3: Test Visual Fields 2 - complete hemianopia   4: Test Facial Palsy 2 - partial paralysis (total or near total paralysis of the lower face)   5A: Test Left Arm Motor Drift 0 - no drift, limb holds 90 (or 45) degrees for full 10 seconds   5B: Test Right Arm Motor Drift 4 - no movement   6A: Test Left Leg Motor Drift 2 - some effort against gravity; leg falls to bed by 5 seconds but has some effort against gravity   6B: Test Right Leg Motor Drift 4 - no movement   7: Test Limb Ataxia (FNF/Heel-Shin) 0 - absent   8: Test Sensation 2 - severe to total sensory loss; patient is not aware of being touched in face, arm, leg   9: Test Language/Aphasia 3 - mute, global aphasia; no usable speech or auditory comprehension   10: Test Dysarthria 2 - severe; patient speech is so slurred as to be unintelligible in the absence of or our of proportion to any dysphagia, or is mute/anarthric    11: Test Extinction/Inattention 2   Total 27              Lab Review  Lab Results   Component Value Date/Time    CHOL 163 02/26/2024 04:32 AM     TRIG 118 02/26/2024 04:32 AM    HDL 25 02/26/2024 04:32 AM          Imaging Review      CT head 2/27  Significant swelling, mild trans-uncal herniation compression on ventricles.   Large infarct in MCA >2/3rd ANGE 1/3rd  Petechial hemorrhage Hi 2 of BG         Assessment:       Acute Ischemic Left ICA Left MCA and Left ANGE stroke  Atrial Fibrillation etiology of stroke  Right Hemiplegia  Aphasia  Petechial hemorrhagic conversion of stroke Hi-2  CYTOTOXIC cerebral edema with midline shift.      Plan:     Post Neuro intervention check list, Access site no issues, peripheral extremities no issues    NO ASA for 24 hours at least, follow up MRI- neurosurgery report no ASA,   Cardiology consult for rate control , no Anticoagulation     PT OT and ST      CEREBRAL EDEMA MANAGEMENT  DECT accurate- Very large infarct with midline shift symptomatic today on regular CT head  START 3% at 50cc hour with goal of 145-155  Mother still in raul  Follow up CT head tomorrow  May need lasix due to cardiac issues with 3%    Further decision pending MRI brain

## 2024-02-27 NOTE — CONSULTS
[facility]- [facility] [place] [place] [facility]  Consultation Report  PATIENT: Sheila Alanis  MEDICAL RECORD NUMBER: [id number]  DATE OF SERVICE:  2024  ATTENDING ELECTROPHYSIOLOGIST: Arlene Cutler MD  PRIMARY ELECTROPHYSIOLOGIST: Arlene Cutler MD  REFERRING PHYSICIAN: No ref. provider found and Potocki, Joseph A, DO  CHIEF COMPLAINT: Altered mental status, strokelike symptoms    HPI: This is a 48 y.o. female with a history of longstanding hypertension, DM, Obesity, bleeding disorder ,Factor VIII deficiency/antiphospholipid AB , Tobacco abuse, HLD, TIA 2018, depression, + family hx (mother  in 50's CAD/DM), and medication noncompliance.   Patient is followed by Dr. Kuo and -Dr. Aguirre for hypertrophic cardiomyopathy, need for ICD implant.  She has had multiple emergency room and hospital visits in the past 3 to 4 months   On 2023 she was seen at [facility] for slurred speech and strokelike symptoms.  Noted of chest pain.  Wearing LifeVest.  No focal neurological deficits noted on exam.  Admission recommended, patient eloped.  2023 for slurred speech and strokelike symptoms with associated chest pain.  Admission recommended, patient again left AMA.  She presented again on  to [facility] with nausea, emesis, diarrhea and myalgias.  She also complained of poor appetite, generalized fatigue, shortness of breath, productive cough and subjective fever/chills. She was found to have acute hypoxic respiratory failure in the setting of pneumonia, as well as new onset AF/AFl with RVR and uncontrolled hypertension.  She left A again 2 to 3 days later.  She now presents with changes in mental status and new neurological symptoms.  She was diagnosed with an acute ischemic left ICA, left MCA and left ANGE stroke and right hemiplegia as well as aphasia.  She has undergone acute intervention--Catheter-based cervical/cerebral angiogram with left ICA,  bed rails

## 2024-02-27 NOTE — CONSULTS
NEUROSURGERY CONSULTATION     Sheila Alanis    Chief Complaint: hemorrhagic stroke    HPI:   Sheila Alanis is a 48 y.o.  female who has history of left hemorrhagic stroke.  She is aphasic.  History of untreated A-fib.    History reviewed. No pertinent past medical history.  No past surgical history on file.   No family history on file.   Social History     Socioeconomic History    Marital status:      Spouse name: Not on file    Number of children: Not on file    Years of education: Not on file    Highest education level: Not on file   Occupational History    Not on file   Tobacco Use    Smoking status: Not on file    Smokeless tobacco: Not on file   Substance and Sexual Activity    Alcohol use: Not on file    Drug use: Not on file    Sexual activity: Not on file   Other Topics Concern    Not on file   Social History Narrative    Not on file     Social Determinants of Health     Financial Resource Strain: Not on file   Food Insecurity: Not on file   Transportation Needs: Not on file   Physical Activity: Not on file   Stress: Not on file   Social Connections: Not on file   Intimate Partner Violence: Not on file   Housing Stability: Not on file       Medications:   Current Facility-Administered Medications   Medication Dose Route Frequency Provider Last Rate Last Admin    perflutren lipid microspheres (DEFINITY) injection 1.5 mL  1.5 mL IntraVENous ONCE PRN Benjamin Villagran MD        hydrALAZINE (APRESOLINE) injection 10 mg  10 mg IntraVENous Q30 Min PRN Wendi Stallworth MD        labetalol (NORMODYNE;TRANDATE) injection 10 mg  10 mg IntraVENous Q1H PRN Wendi Stallworth MD        polyethylene glycol (GLYCOLAX) packet 17 g  17 g Oral Daily Wendi Stallworth MD        sennosides-docusate sodium (SENOKOT-S) 8.6-50 MG tablet 2 tablet  2 tablet Oral Daily Wendi Stallworth MD        atorvastatin (LIPITOR) tablet 40 mg  40 mg Oral Nightly Wendi Stallworth MD        carvedilol (COREG)

## 2024-02-27 NOTE — PLAN OF CARE
Problem: Pain  Goal: Verbalizes/displays adequate comfort level or baseline comfort level  2/27/2024 0928 by Keara Stringer, RN  Outcome: Progressing     Problem: Safety - Adult  Goal: Free from fall injury  2/27/2024 0928 by Keara Stringer, RN  Outcome: Progressing     Problem: Skin/Tissue Integrity  Goal: Absence of new skin breakdown  Description: 1.  Monitor for areas of redness and/or skin breakdown  2.  Assess vascular access sites hourly  3.  Every 4-6 hours minimum:  Change oxygen saturation probe site  4.  Every 4-6 hours:  If on nasal continuous positive airway pressure, respiratory therapy assess nares and determine need for appliance change or resting period.  2/27/2024 0928 by Keara Stringer, RN  Outcome: Progressing     Problem: Safety - Medical Restraint  Goal: Remains free of injury from restraints (Restraint for Interference with Medical Device)  Description: INTERVENTIONS:  1. Determine that other, less restrictive measures have been tried or would not be effective before applying the restraint  2. Evaluate the patient's condition at the time of restraint application  3. Inform patient/family regarding the reason for restraint  4. Q2H: Monitor safety, psychosocial status, comfort, nutrition and hydration  2/27/2024 0928 by Keara Stringer, RN  Outcome: Progressing

## 2024-02-27 NOTE — PROCEDURES
Sheila Alanis is a 48 y.o. female patient.  1. Cerebrovascular accident (CVA), unspecified mechanism (HCC)    2. Atrial fibrillation with RVR (HCC)    3. Acute cerebrovascular accident (CVA) due to ischemia (HCC)      History reviewed. No pertinent past medical history.  Blood pressure 137/73, pulse (!) 112, temperature 98.1 °F (36.7 °C), temperature source Temporal, resp. rate 19, height 1.727 m (5' 8\"), weight 113.5 kg (250 lb 3.6 oz), SpO2 91 %.    Central Line    Date/Time: 2/27/2024 10:46 AM    Performed by: Trevor Red DO  Authorized by: Trevor Red DO  Consent: Verbal consent obtained.  Consent given by: parent  Patient understanding: patient states understanding of the procedure being performed  Patient consent: the patient's understanding of the procedure matches consent given  Patient identity confirmed: arm band  Indications: vascular access  Anesthesia: local infiltration    Anesthesia:  Local Anesthetic: lidocaine 1% with epinephrine  Anesthetic total: 10 mL    Sedation:  Patient sedated: no    Preparation: skin prepped with 2% chlorhexidine  Skin prep agent dried: skin prep agent completely dried prior to procedure  Sterile barriers: all five maximum sterile barriers used - cap, mask, sterile gown, sterile gloves, and large sterile sheet  Hand hygiene: hand hygiene performed prior to central venous catheter insertion  Location details: right internal jugular  Patient position: Trendelenburg  Catheter type: triple lumen  Catheter size: 7 Fr  Ultrasound guidance: yes  Sterile ultrasound techniques: sterile gel and sterile probe covers were used  Number of attempts: 1  Successful placement: yes  Post-procedure: line sutured and dressing applied  Assessment: blood return through all ports, free fluid flow and placement verified by x-ray  Patient tolerance: patient tolerated the procedure well with no immediate complications  Comments: Dr. Michael was present and available for the

## 2024-02-27 NOTE — FLOWSHEET NOTE
Dr. Cutler updated that amio bolus given and drip started @1544; pt still RVR 130s-140s, BP stable 132/75; will be to see pt shortly.

## 2024-02-27 NOTE — PROGRESS NOTES
fibrillation with rapid ventricular rate    EK2024: Atrial fibrillation with RVR, PVCs   Echocardiogram:   Limited study 2024  Interpretation Summary         Left Ventricle: Normal left ventricular systolic function with EF of 60 - 65%. Severe concentric hypertrophy.    Left Atrium: Left atrium is mildly dilated. Left atrial volume index is severely increased (>48 mL/m2). No thrombus.    No intracardiac thormbus.    Limited study to r/o thrombus    Prior study done 2023     Echo Findings    Left Ventricle Normal left ventricular systolic function with a visually estimated EF of 60 - 65%. Findings consistent with severe concentric hypertrophy.   Left Atrium Left atrium is mildly dilated. Left atrial volume index is severely increased (>48 mL/m2). No thrombus.   Right Atrium No thrombus.       Cardiac Catheterization:   --        I have independently reviewed all of the ECGs and rhythm strips per above     Assessment/Plan: This is a 48 y.o. female with     1.  Atrial fibrillation with rapid ventricular rate--history of hypertrophic cardiomyopathy, noncompliance with medications, uncontrolled hypertension  Acute stroke involving a large territory with complete occlusion of the left internal carotid artery after the carotid bulb.  - Rates controled on IV Amiodarone, Cardizem and Coreg.     2.  Stroke--related to acute complete occlusion of the left internal carotid artery with no visualization of intracranial left MCA or ANGE  Now post acute intervention/thrombectomy  CT of the head today shows infarct involving the posterior frontal and parietal lobes of the left cerebral hemisphere, intraparenchymal hemorrhage, no transtentorial or parafalcine herniation    3.  Hypertension, longstanding  Noncompliance with treatment and follow-up    4.  Hypertrophic cardiomyopathy--echo shows severe LVH  Patient being evaluated at  for ICD implantation    5.  Coagulopathy--review of notes shows either a factor  VIII deficiency or antiphospholipid antibody related disorder    5.  Diabetes--longstanding    Recommendations:    Reduce Amiodarone to 0.5mg/min after 24 hours at 1500 today.   Continue IV Cardizem.   Continue Coreg 25mg BID.   Systemic anticoagulation based on results of further neuroimaging  Monitor and control blood pressure     Thank you for allowing me to participate in your patient's care.  Please call me if there are any questions or concerns.      Discussed with Dr. Omayra Mendoza, APRN - CNP  Cardiac Electrophysiology  [facility]  [facility] [place]: [facility]    PHYSICIAN ADDENDUM:  I independently contributed to, made amendments as needed, and finalized the above documentation. I contributed >50% to the overall encounter time including review of testing, formulating a plan with the APC and communication with the other care team members and family.     Time of the day of service includes:  Preparing to see the patient (eg. Review of the medical record, such as tests).  Obtaining and/or reviewing separately obtained history.  Ordering prescription medications, tests, and/or procedures.  Communicating results to the patient/family/caregiver.  Counseling/educating the patient/family/caregiver.  Documenting clinical information in the patients electronic record.  Coordination of care for the patient.  Performing a medical appropriate exam and/or evaluation.    Arlene Cutler MD  Cardiac electrophysiology  [facility]

## 2024-02-27 NOTE — VIRTUAL HEALTH
[facility] Stroke and Telestroke Consult for  [facility] Stroke Alert through [employer] @ 21:10  2/26/2024 8:52 PM    Pt Name: Sheila Alanis  MRN: [id number]  YOB: 1975  Date of evaluation: 2/26/2024  Primary Care Physician: Potocki, Joseph A, DO  Reason for Evaluation: Stroke evaluation with Phone Consult, Discussion and Review of imaging    Sheila Alanis is a 48 y.o. female with unknown past medical history who presents with right sided weakness and language deficits.  Little was known about the patient upon presentation as she was an unknown and could not speak.  Patient had a head CT performed which showed no acute processes.  Stroke code called.      LKW: 2000  NIH:  25    Allergies  is allergic to codeine, fentanyl, levaquin [levofloxacin], penicillins, and sulfa antibiotics.  Medications  Prior to Admission medications    Not on File    Scheduled Meds:  Continuous Infusions:  PRN Meds:.  Past Medical History   has no past medical history on file.  Social History  Social History     Socioeconomic History    Marital status:      Spouse name: Not on file    Number of children: Not on file    Years of education: Not on file    Highest education level: Not on file   Occupational History    Not on file   Tobacco Use    Smoking status: Not on file    Smokeless tobacco: Not on file   Substance and Sexual Activity    Alcohol use: Not on file    Drug use: Not on file    Sexual activity: Not on file   Other Topics Concern    Not on file   Social History Narrative    Not on file     Social Determinants of Health     Financial Resource Strain: Not on file   Food Insecurity: Not on file   Transportation Needs: Not on file   Physical Activity: Not on file   Stress: Not on file   Social Connections: Not on file   Intimate Partner Violence: Not on file   Housing Stability: Not on file     Family History  No family history on file.    OBJECTIVE  BP (!) 163/130   Pulse

## 2024-02-27 NOTE — PROGRESS NOTES
Physical Therapy  Physical Therapy Attempt    Name: Sheila Alanis  : 1975  MRN: [id number]      Date of Service: 2024  Chart reviewed.  Spoke with RN - pt does not follow commands at this time.  Will re-attempt as able.    Carlos Herrera, PT, DPT  [id number]

## 2024-02-27 NOTE — PROGRESS NOTES
Intensive Care Unit  Critical Care Consult  Daily Progress Note   2/27/2024    Date of Admission: 2/26  Date of Admission to ICU: 2/26    EVENTS:   Presented to [facility] with right side weakness. Found to have L MCA occlusion. She has PMH of factor 8 deficiency. She was treated with TNK and transferred to [facility]. She was taken to IR for thrombectomy of L ICA/ANGE/MCA. Admitted to [facility]. She was found to be in new onset a fib, started on diltiazem, also started on nicardipine gtt.  She had a repeat CT this morning which revealed hemorrhagic conversion.  On exam, she is lethargic but does open eyes to pain and moves extremities.   She was admitted to [facility] 2/22 for N/V/D,sepsis d/t pneumonia, and a fib. She signed out AMA. She has been in a fib for a year, per her mom.    2/27 no events overnight. Repeat CT this morning with increased edema, started on 3%. TF started    PHYSICAL EXAM:    /73   Pulse (!) 112   Temp 98.1 °F (36.7 °C) (Temporal)   Resp 19   Ht 1.727 m (5' 8\")   Wt 113.5 kg (250 lb 3.6 oz)   SpO2 91%   BMI 38.05 kg/m²       Pain Description: none    GCS:    3 - Opens eyes to loud noise or command   5 - Pushes away noxious stimulus  2 - Moans, makes unintelligible sounds       Pupil size: Left 3 mm  Right 3 mm  Pupil reaction: Yes  Moves extremities to pain, does not follow commands    CONSTITUTIONAL: no acute distress, lying in hospital bed, appears comfortable.  NEUROLOGIC: PERRL  CARDIOVASCULAR: S1 S2, regular rate, regular rhythm, no murmur/gallop/rub. Monitor: a fib  PULMONARY: Breath sounds clear. No rhonchi/rales/wheezes. No use of accessory muscles, no retractions.  RENAL: clear yellow urine. Reynaga   ABDOMEN: soft, nontender, nondistended, nontympanic, normal bowel sounds   MUSCULOSKELETAL: moves all extremities purposefully   SKIN/EXTREMITIES: no rashes/ecchymosis, no edema/clubbing, warm/dry, good capillary refill       LINES:   PIV   A-line       History reviewed. No pertinent past medical  history.    ASSESSMENT/PLAN:     Principal Problem:    Acute cerebrovascular accident (CVA) due to ischemia (HCC)  Active Problems:    Atrial fibrillation with rapid ventricular response (HCC)    Hypertrophic cardiomyopathy (HCC)    History of bleeding disorder    Primary hypertension    Cerebrovascular accident (CVA) due to stenosis of left carotid artery (HCC)  Resolved Problems:    * No resolved hospital problems. *      Neuro:  L MCA CVA s/p TNK and thrombectomy L ICA/ANGE/MCA  with hemorrhagic conversion  Monitor neuro status.   Neurology following.   Neurosurgery following   [facility] for seizure prophylaxis.    Stroke protocol and education.  Repeat CT worsening edema  3% started  CV: a fib, uncontrolled. No acute issues. PMH: of HTN, hyperlipidemia, a fib  Monitor hemodynamics.   SBP goal less than 160 mmHg. MAP greater than 65 mmHg  PRN hydralazine & labetalol.   Statin.  2D Echo.   Cardene gtt.    Cardiology following.   Cardizem gtt weaning  Amiodarone started.  EP consulted  Pulm:  No acute issues.  PMH: LUCA, COPD, tobacco use,   Monitor RR & SpO2.   Encourage cough and deep breathing.   Supplemental oxygen PRN.  GI: dysphagia  Gastric tube. NPO.   Monitor bowel function.    Swallow eval when able   SLP for dysphagia, cognitive evaluation  Renal:  hypocalcemia.  Baseline Cr: 0.7  Monitor BUN & Cr.   Monitor electrolytes & replace as needed.    Monitor urine output.   ID: recent admission for sepsis pna  Afebrile    Leukocytosis  Blood cultures  Chest xray  Endocrine: Hyperglycemia.   Monitor BGL.   MSK: No acute issues. Right   ROM.   Turn & reposition.   PT/OT   Heme: factor 8 deficiency  Monitor CBC.     Bowel regimen: Glycolax, senna. Last BM PTA    Pain control:   -Acute Pain--I am managing the acute pain and prescription drug changes with multimodality pain control.    ICU prophylaxis:   Stress ulcer: PPI   VTE: SCDs  Mouth/Eye care: artificial tears. Peridex.   Reynaga: Keep in place for critical care

## 2024-02-27 NOTE — FLOWSHEET NOTE
Bedside swallow evaluation completed. After the first swallow of water, patient began to cough and choke. Surgical resident notified; patient unable to take PO medications. Order obtained for NGT insertion for medications.

## 2024-02-27 NOTE — PROGRESS NOTES
Comprehensive Nutrition Assessment    Type and Reason for Visit:  Initial (New TF Auto Assess)    Nutrition Recommendations/Plan:   Continue NPO.   Modify Current EN to appropriately meet estimated needs & monitor.    TF Recommendations:  Standard w/ Fiber (Jevity 1.5) @ 65ml/hr (Goal) Continuous x 24hr/d= 1560ml TV, 2340kcal, 100gm Pro, 1186ml freewater.     Flush per critical care mgmt.     Malnutrition Assessment:  Malnutrition Status:  Insufficient data (02/27/24 1413)    Context:  Acute Illness     Findings of the 6 clinical characteristics of malnutrition:  Energy Intake:  Mild decrease in energy intake (Comment) (since adm)  Weight Loss:  Unable to assess (2/2 poor EMR Wt hx pta)     Body Fat Loss:  No significant body fat loss     Muscle Mass Loss:  No significant muscle mass loss    Fluid Accumulation:  Unable to assess (2/2 multi-factorial)     Strength:  Not Performed    Nutrition Assessment:    Pt adm/tx from [facility] for AMS/stroke symptoms just pta.  PMHx HTN, AF.  Adm w/ Acute CVA, AHRF, hypertrophic cardiomyopathy, AF/RVR, s/p angiogram/thrombectomy 2/26.  Noted CT w/ hemorrhagic conversion 2/26, failed bedside swallow eval resulting in NG insert 2/26.  Repeat CT w/ increased edema today.  At risk d/t poor intake since adm d/t NPO status w/ need for EN support 2/2 CVA/Dysphagia.  Will provide goal EN rec's to appropriately meet current estimated needs and monitor.    Nutrition Related Findings:    AMSX4, non-verbal, poor dentition, Abd WDL, hypo BS, NG clamped, +1 edema, I/O's WNL, elevated BUN/BGL/ALT, hypophos Wound Type: None       Current Nutrition Intake & Therapies:    Average Meal Intake: NPO  Average Supplements Intake: NPO  ADULT TUBE FEEDING; Orogastric; Standard with Fiber; Continuous; 20; Yes; 10; Q 4 hours; 40; 30; Q 4 hours  Current Tube Feeding (TF) Orders:  Feeding Route: Orogastric  Formula: Standard with Fiber  Schedule: Continuous  Feeding Regimen: 40ml/hr; 960ml

## 2024-02-27 NOTE — PLAN OF CARE
Problem: Chronic Conditions and Co-morbidities  Goal: Patient's chronic conditions and co-morbidity symptoms are monitored and maintained or improved  Outcome: Progressing     Problem: Discharge Planning  Goal: Discharge to home or other facility with appropriate resources  Outcome: Progressing     Problem: Pain  Goal: Verbalizes/displays adequate comfort level or baseline comfort level  Outcome: Progressing     Problem: Safety - Adult  Goal: Free from fall injury  Outcome: Progressing     Problem: Skin/Tissue Integrity  Goal: Absence of new skin breakdown  Outcome: Progressing     Problem: Risk for Elopement  Goal: Patient will not exit the unit/facility without proper excort  Outcome: Progressing     Problem: Safety - Medical Restraint  Goal: Remains free of injury from restraints (Restraint for Interference with Medical Device)  2/27/2024 0135 by Shanique Majano, RN  Outcome: Progressing  2/26/2024 1553 by Janett Kraus, RN  Outcome: Progressing

## 2024-02-27 NOTE — PROGRESS NOTES
[facility] Hospitalist Progress Note    Synopsis: The patient is a 48-year-old female with a past medical history of hokum, Factor VIII deficiency, atrial fibrillation, DVT and TIAs who presented to [facility] for right-sided weakness.  Workup showed a left MCA stroke.  Patient was given TNK.  She was subsequently transferred to [facility] main campus.  Neuroendovascular surgery performed a thrombectomy.  Patient had a repeat CT of the head which showed a left putamen hemorrhage.  Of note patient also has A-fib RVR.  Patient is currently under ICU care.  She is on a Cardene drip.  Cardiology has also been consulted.    Subjective:  Patient is being followed for Atrial fibrillation with RVR ([facility]) [I48.91]  Cerebrovascular accident (CVA), unspecified mechanism (HCC) [I63.9]  Acute cerebrovascular accident (CVA) due to ischemia ([facility]) [I63.9]     Intubated  Nursing at bedside      ROS: denies fever, chills, cp, sob, n/v, HA unless stated above.      polyethylene glycol  17 g Oral Daily    sennosides-docusate sodium  2 tablet Oral Daily    atorvastatin  40 mg Oral Nightly    carvedilol  25 mg Oral BID    hydrALAZINE  75 mg Oral 3 times per day    levETIRAcetam  500 mg IntraVENous Q12H    sodium chloride flush  5-40 mL IntraVENous BID    famotidine (PEPCID) injection  20 mg IntraVENous BID     perflutren lipid microspheres, 1.5 mL, ONCE PRN  hydrALAZINE, 10 mg, Q30 Min PRN  labetalol, 10 mg, Q1H PRN  acetaminophen, 650 mg, Q4H PRN  ondansetron, 4 mg, Q6H PRN  sodium chloride flush, 5-40 mL, PRN         Objective:    /87   Pulse 92   Temp 98.4 °F (36.9 °C)   Resp 18   Ht 1.727 m (5' 8\")   Wt 113.5 kg (250 lb 3.6 oz)   SpO2 93%   BMI 38.05 kg/m²     General Appearance: Lethargic.  Opens eyes to pain  Skin: warm and dry  Head: normocephalic and atraumatic  Eyes: pupils equal, round, and reactive to light, extraocular eye movements intact, conjunctivae normal  Neck: neck supple and non

## 2024-02-27 NOTE — PROGRESS NOTES
[place] [facility]   INTENSIVE CARE UNIT     CRITICAL CARE ATTENDING PROGRESS NOTE    I have examined the patient, reviewed the record, and discussed the case with the APN/  Resident. I have reviewed all relevant labs and imaging data.    Please refer to the  APN/ resident's note. I agree with the  assessment and plan with the following corrections/ additions. The following summarizes my clinical findings and independent assessment.     CC: Left MCA stroke    HOSPITAL COURSE:  2/26-presented [facility] with right-sided weakness.  Found to have left MCA occlusion.  Underwent TNK and subsequent mechanical thrombectomy for left ICA, ANGE and MCA.  Postop she was found to be in A-fib and started on Cardizem drip.  2/27 started on 3% hypertonic saline    EXAM:  Not intubated  Pupils equal round reactive light  Does not follow commands  Localizes to pain  Open eyes to voice  Heart and A-fib lungs coarse  Abdomen soft nontender nondistended    ASSESSMENT:  Principal Problem:    Acute cerebrovascular accident (CVA) due to ischemia (HCC)  Active Problems:    Atrial fibrillation with rapid ventricular response (HCC)    Hypertrophic cardiomyopathy (HCC)    History of bleeding disorder    Primary hypertension    Cerebrovascular accident (CVA) (HCC)  Resolved Problems:    * No resolved hospital problems. *       PLAN:   -I have reviewed the following  labs:  CBC:   Lab Results   Component Value Date/Time    WBC 14.6 02/27/2024 04:01 AM    RBC 4.32 02/27/2024 04:01 AM    HGB 9.8 02/27/2024 04:01 AM    HCT 33.4 02/27/2024 04:01 AM    MCV 77.3 02/27/2024 04:01 AM    MCH 22.7 02/27/2024 04:01 AM    MCHC 29.3 02/27/2024 04:01 AM    RDW 18.2 02/27/2024 04:01 AM     02/27/2024 04:01 AM    MPV 10.0 02/27/2024 04:01 AM     CMP:    Lab Results   Component Value Date/Time     02/27/2024 04:01 AM    K 3.5 02/27/2024 04:01 AM     02/27/2024 04:01 AM    CO2 22 02/27/2024 04:01 AM    BUN 25 02/27/2024 04:01 AM     CREATININE 0.7 02/27/2024 04:01 AM    LABGLOM >60 02/27/2024 04:01 AM    GLUCOSE 168 02/27/2024 04:01 AM    PROT 5.6 02/27/2024 04:01 AM    LABALBU 3.3 02/27/2024 04:01 AM    CALCIUM 8.2 02/27/2024 04:01 AM    BILITOT 0.5 02/27/2024 04:01 AM    ALKPHOS 80 02/27/2024 04:01 AM    AST 18 02/27/2024 04:01 AM    ALT 36 02/27/2024 04:01 AM       -I have personally reviewed the CAT Scan imaging and my interpretation is large left cerebral infarct.    - Left MCA cerebrovascular event-status post TNK and thrombectomy with hemorrhagic conversion.  CT head from this morning shows worsening edema and shift  Start 3% hypertonic saline  Continue monitor neurostatus    CV: New onset A-hjy-yodmiitb Cardizem drip and amiodarone drip.  Continue Coreg  - Reviewed EP progress note from 2/27    Pulmonary: No respiratory status-at high risk for intubation    GI: N.p.o.-start tube feeds    FEN: Monitor renal function    ID: None    Heme: Monitor CBC    Endo: Monitor Blood Sugars. Target blood glucose less than 180 in the ICU.      DVT prophylaxis--SCDS,    GI Prophylaxis--Pepcid  Lines--right IJ triple-lumen catheter-2/27  CODE: FULL    DISPOSITION-Continue ICU Care    Critical care time exclusive of teaching and procedures = 39 min     I provided critical care to a patient with neurological insult (left MCA infarct with cerebral edema, new onset A-fib) requiring frequent and emergent imaging, lab studies, intensive monitoring and data review as well as urgent coordination with multiple specialists.  Pt needs continuous ICU monitoring because the patient is at risk for deterioration from a neurological standpoint.    Gwyn Michael MD, FACS  2/27/2024  1:17 PM    NOTE: This report was transcribed using voice recognition software. Every effort was made to ensure accuracy; however, inadvertent computerized transcription errors may be present.

## 2024-02-27 NOTE — PROGRESS NOTES
Patient reaches for NG with left hand when unrestrained. For safety, left soft wrist restraint continued.

## 2024-02-28 PROBLEM — Z51.5 PALLIATIVE CARE ENCOUNTER: Status: ACTIVE | Noted: 2024-02-28

## 2024-02-28 PROBLEM — R13.12 OROPHARYNGEAL DYSPHAGIA: Status: ACTIVE | Noted: 2024-02-28

## 2024-02-28 PROBLEM — Z71.89 GOALS OF CARE, COUNSELING/DISCUSSION: Status: ACTIVE | Noted: 2024-02-28

## 2024-02-28 LAB
ALBUMIN SERPL-MCNC: 3.4 G/DL (ref 3.5–5.2)
ALP SERPL-CCNC: 79 U/L (ref 35–104)
ALT SERPL-CCNC: 31 U/L (ref 0–32)
ANION GAP SERPL CALCULATED.3IONS-SCNC: 10 MMOL/L (ref 7–16)
AST SERPL-CCNC: 28 U/L (ref 0–31)
BASOPHILS # BLD: 0.03 K/UL (ref 0–0.2)
BASOPHILS NFR BLD: 0 % (ref 0–2)
BILIRUB SERPL-MCNC: 0.5 MG/DL (ref 0–1.2)
BUN SERPL-MCNC: 18 MG/DL (ref 6–20)
CA-I BLD-SCNC: 1.12 MMOL/L (ref 1.15–1.33)
CALCIUM SERPL-MCNC: 8.2 MG/DL (ref 8.6–10.2)
CHLORIDE SERPL-SCNC: 107 MMOL/L (ref 98–107)
CO2 SERPL-SCNC: 24 MMOL/L (ref 22–29)
CREAT SERPL-MCNC: 0.6 MG/DL (ref 0.5–1)
EOSINOPHIL # BLD: 0.04 K/UL (ref 0.05–0.5)
EOSINOPHILS RELATIVE PERCENT: 0 % (ref 0–6)
ERYTHROCYTE [DISTWIDTH] IN BLOOD BY AUTOMATED COUNT: 18.3 % (ref 11.5–15)
GFR SERPL CREATININE-BSD FRML MDRD: >60 ML/MIN/1.73M2
GLUCOSE BLD-MCNC: 159 MG/DL (ref 74–99)
GLUCOSE BLD-MCNC: 182 MG/DL (ref 74–99)
GLUCOSE BLD-MCNC: 229 MG/DL (ref 74–99)
GLUCOSE SERPL-MCNC: 181 MG/DL (ref 74–99)
HCT VFR BLD AUTO: 32.3 % (ref 34–48)
HGB BLD-MCNC: 9.5 G/DL (ref 11.5–15.5)
IMM GRANULOCYTES # BLD AUTO: 0.12 K/UL (ref 0–0.58)
IMM GRANULOCYTES NFR BLD: 1 % (ref 0–5)
LYMPHOCYTES NFR BLD: 1.99 K/UL (ref 1.5–4)
LYMPHOCYTES RELATIVE PERCENT: 15 % (ref 20–42)
MAGNESIUM SERPL-MCNC: 1.9 MG/DL (ref 1.6–2.6)
MCH RBC QN AUTO: 22.6 PG (ref 26–35)
MCHC RBC AUTO-ENTMCNC: 29.4 G/DL (ref 32–34.5)
MCV RBC AUTO: 76.9 FL (ref 80–99.9)
MONOCYTES NFR BLD: 0.79 K/UL (ref 0.1–0.95)
MONOCYTES NFR BLD: 6 % (ref 2–12)
NEUTROPHILS NFR BLD: 78 % (ref 43–80)
NEUTS SEG NFR BLD: 10.51 K/UL (ref 1.8–7.3)
PHOSPHATE SERPL-MCNC: 1.7 MG/DL (ref 2.5–4.5)
PLATELET # BLD AUTO: 325 K/UL (ref 130–450)
PMV BLD AUTO: 10.5 FL (ref 7–12)
POTASSIUM SERPL-SCNC: 3.6 MMOL/L (ref 3.5–5)
PROT SERPL-MCNC: 6 G/DL (ref 6.4–8.3)
RBC # BLD AUTO: 4.2 M/UL (ref 3.5–5.5)
SODIUM SERPL-SCNC: 138 MMOL/L (ref 132–146)
SODIUM SERPL-SCNC: 139 MMOL/L (ref 132–146)
SODIUM SERPL-SCNC: 140 MMOL/L (ref 132–146)
SODIUM SERPL-SCNC: 141 MMOL/L (ref 132–146)
SODIUM SERPL-SCNC: 141 MMOL/L (ref 132–146)
WBC OTHER # BLD: 13.5 K/UL (ref 4.5–11.5)

## 2024-02-28 PROCEDURE — 84295 ASSAY OF SERUM SODIUM: CPT

## 2024-02-28 PROCEDURE — 85025 COMPLETE CBC W/AUTO DIFF WBC: CPT

## 2024-02-28 PROCEDURE — 80053 COMPREHEN METABOLIC PANEL: CPT

## 2024-02-28 PROCEDURE — 6370000000 HC RX 637 (ALT 250 FOR IP): Performed by: NURSE PRACTITIONER

## 2024-02-28 PROCEDURE — 2000000000 HC ICU R&B

## 2024-02-28 PROCEDURE — 84100 ASSAY OF PHOSPHORUS: CPT

## 2024-02-28 PROCEDURE — 2500000003 HC RX 250 WO HCPCS: Performed by: SURGERY

## 2024-02-28 PROCEDURE — 99222 1ST HOSP IP/OBS MODERATE 55: CPT | Performed by: NURSE PRACTITIONER

## 2024-02-28 PROCEDURE — 6370000000 HC RX 637 (ALT 250 FOR IP): Performed by: STUDENT IN AN ORGANIZED HEALTH CARE EDUCATION/TRAINING PROGRAM

## 2024-02-28 PROCEDURE — 6360000002 HC RX W HCPCS: Performed by: NURSE PRACTITIONER

## 2024-02-28 PROCEDURE — 6360000002 HC RX W HCPCS: Performed by: CLINICAL NURSE SPECIALIST

## 2024-02-28 PROCEDURE — 2700000000 HC OXYGEN THERAPY PER DAY

## 2024-02-28 PROCEDURE — 2580000003 HC RX 258: Performed by: NURSE PRACTITIONER

## 2024-02-28 PROCEDURE — 2500000003 HC RX 250 WO HCPCS: Performed by: STUDENT IN AN ORGANIZED HEALTH CARE EDUCATION/TRAINING PROGRAM

## 2024-02-28 PROCEDURE — 6360000002 HC RX W HCPCS: Performed by: STUDENT IN AN ORGANIZED HEALTH CARE EDUCATION/TRAINING PROGRAM

## 2024-02-28 PROCEDURE — 37799 UNLISTED PX VASCULAR SURGERY: CPT

## 2024-02-28 PROCEDURE — 2580000003 HC RX 258: Performed by: INTERNAL MEDICINE

## 2024-02-28 PROCEDURE — 2580000003 HC RX 258: Performed by: STUDENT IN AN ORGANIZED HEALTH CARE EDUCATION/TRAINING PROGRAM

## 2024-02-28 PROCEDURE — 83735 ASSAY OF MAGNESIUM: CPT

## 2024-02-28 PROCEDURE — 82330 ASSAY OF CALCIUM: CPT

## 2024-02-28 PROCEDURE — 2580000003 HC RX 258: Performed by: SURGERY

## 2024-02-28 PROCEDURE — 99291 CRITICAL CARE FIRST HOUR: CPT | Performed by: NURSE PRACTITIONER

## 2024-02-28 PROCEDURE — 82962 GLUCOSE BLOOD TEST: CPT

## 2024-02-28 PROCEDURE — 99291 CRITICAL CARE FIRST HOUR: CPT | Performed by: SURGERY

## 2024-02-28 RX ORDER — FAMOTIDINE 20 MG/1
20 TABLET, FILM COATED ORAL 2 TIMES DAILY
Status: DISCONTINUED | OUTPATIENT
Start: 2024-02-28 | End: 2024-02-29

## 2024-02-28 RX ORDER — GLUCAGON 1 MG/ML
1 KIT INJECTION PRN
Status: DISCONTINUED | OUTPATIENT
Start: 2024-02-28 | End: 2024-02-29

## 2024-02-28 RX ORDER — BISACODYL 10 MG
10 SUPPOSITORY, RECTAL RECTAL ONCE
Status: COMPLETED | OUTPATIENT
Start: 2024-02-28 | End: 2024-02-28

## 2024-02-28 RX ORDER — ACETAMINOPHEN 325 MG/1
650 TABLET ORAL EVERY 4 HOURS PRN
Status: DISCONTINUED | OUTPATIENT
Start: 2024-02-28 | End: 2024-03-01 | Stop reason: HOSPADM

## 2024-02-28 RX ORDER — POLYETHYLENE GLYCOL 3350 17 G/17G
17 POWDER, FOR SOLUTION ORAL DAILY
Status: DISCONTINUED | OUTPATIENT
Start: 2024-02-29 | End: 2024-02-29

## 2024-02-28 RX ORDER — MAGNESIUM SULFATE IN WATER 40 MG/ML
2000 INJECTION, SOLUTION INTRAVENOUS ONCE
Status: COMPLETED | OUTPATIENT
Start: 2024-02-28 | End: 2024-02-28

## 2024-02-28 RX ORDER — ATORVASTATIN CALCIUM 40 MG/1
40 TABLET, FILM COATED ORAL NIGHTLY
Status: DISCONTINUED | OUTPATIENT
Start: 2024-02-28 | End: 2024-02-29

## 2024-02-28 RX ORDER — INSULIN LISPRO 100 [IU]/ML
0-4 INJECTION, SOLUTION INTRAVENOUS; SUBCUTANEOUS EVERY 4 HOURS
Status: DISCONTINUED | OUTPATIENT
Start: 2024-02-28 | End: 2024-02-29

## 2024-02-28 RX ORDER — CARVEDILOL 25 MG/1
25 TABLET ORAL 2 TIMES DAILY
Status: DISCONTINUED | OUTPATIENT
Start: 2024-02-28 | End: 2024-02-29

## 2024-02-28 RX ORDER — DEXTROSE MONOHYDRATE 100 MG/ML
INJECTION, SOLUTION INTRAVENOUS CONTINUOUS PRN
Status: DISCONTINUED | OUTPATIENT
Start: 2024-02-28 | End: 2024-02-29

## 2024-02-28 RX ADMIN — Medication 500 MG: at 16:39

## 2024-02-28 RX ADMIN — ATORVASTATIN CALCIUM 40 MG: 40 TABLET, FILM COATED ORAL at 21:46

## 2024-02-28 RX ADMIN — SODIUM CHLORIDE, PRESERVATIVE FREE 10 ML: 5 INJECTION INTRAVENOUS at 21:48

## 2024-02-28 RX ADMIN — POLYETHYLENE GLYCOL 3350 17 G: 17 POWDER, FOR SOLUTION ORAL at 08:09

## 2024-02-28 RX ADMIN — FAMOTIDINE 20 MG: 20 TABLET, FILM COATED ORAL at 08:09

## 2024-02-28 RX ADMIN — FAMOTIDINE 20 MG: 20 TABLET, FILM COATED ORAL at 21:46

## 2024-02-28 RX ADMIN — HYDRALAZINE HYDROCHLORIDE 75 MG: 50 TABLET ORAL at 21:47

## 2024-02-28 RX ADMIN — SODIUM CHLORIDE 10 MG/HR: 900 INJECTION, SOLUTION INTRAVENOUS at 18:49

## 2024-02-28 RX ADMIN — INSULIN LISPRO 2 UNITS: 100 INJECTION, SOLUTION INTRAVENOUS; SUBCUTANEOUS at 12:57

## 2024-02-28 RX ADMIN — LEVETIRACETAM 500 MG: 100 INJECTION INTRAVENOUS at 08:09

## 2024-02-28 RX ADMIN — CARVEDILOL 25 MG: 25 TABLET, FILM COATED ORAL at 08:09

## 2024-02-28 RX ADMIN — LEVETIRACETAM 500 MG: 100 INJECTION INTRAVENOUS at 20:27

## 2024-02-28 RX ADMIN — Medication 500 MG: at 12:56

## 2024-02-28 RX ADMIN — AMIODARONE HYDROCHLORIDE 0.5 MG/MIN: 50 INJECTION, SOLUTION INTRAVENOUS at 08:46

## 2024-02-28 RX ADMIN — Medication 500 MG: at 21:55

## 2024-02-28 RX ADMIN — CALCIUM GLUCONATE 2000 MG: 98 INJECTION, SOLUTION INTRAVENOUS at 08:18

## 2024-02-28 RX ADMIN — MAGNESIUM SULFATE HEPTAHYDRATE 2000 MG: 40 INJECTION, SOLUTION INTRAVENOUS at 08:20

## 2024-02-28 RX ADMIN — HYDRALAZINE HYDROCHLORIDE 75 MG: 50 TABLET ORAL at 12:56

## 2024-02-28 RX ADMIN — BISACODYL 10 MG: 10 SUPPOSITORY RECTAL at 12:57

## 2024-02-28 RX ADMIN — Medication 500 MG: at 08:09

## 2024-02-28 RX ADMIN — SODIUM ACETATE: 164 INJECTION, SOLUTION, CONCENTRATE INTRAVENOUS at 23:45

## 2024-02-28 RX ADMIN — SODIUM CHLORIDE, PRESERVATIVE FREE 10 ML: 5 INJECTION INTRAVENOUS at 08:10

## 2024-02-28 RX ADMIN — CARVEDILOL 25 MG: 25 TABLET, FILM COATED ORAL at 21:46

## 2024-02-28 RX ADMIN — SODIUM CHLORIDE 10 MG/HR: 900 INJECTION, SOLUTION INTRAVENOUS at 08:44

## 2024-02-28 RX ADMIN — LABETALOL HYDROCHLORIDE 10 MG: 5 INJECTION INTRAVENOUS at 16:24

## 2024-02-28 RX ADMIN — HYDRALAZINE HYDROCHLORIDE 75 MG: 50 TABLET ORAL at 07:12

## 2024-02-28 ASSESSMENT — PAIN SCALES - GENERAL
PAINLEVEL_OUTOF10: 3
PAINLEVEL_OUTOF10: 1
PAINLEVEL_OUTOF10: 1

## 2024-02-28 NOTE — PROGRESS NOTES
Physical Therapy    PT consult to evaluate/treat received and appreciated.  Pt chart reviewed and evaluation attempted.  Pt is currently not following commands.  Will check back as able.  Thank you.        Brando Dalal, PT, DPT   [id number]

## 2024-02-28 NOTE — PLAN OF CARE
Problem: Pain  Goal: Verbalizes/displays adequate comfort level or baseline comfort level  2/27/2024 0928 by Keara Stringer RN  Outcome: Progressing     Problem: Safety - Adult  Goal: Free from fall injury  2/27/2024 0928 by Keara Stringer RN  Outcome: Progressing     Problem: Skin/Tissue Integrity  Goal: Absence of new skin breakdown  Description: 1.  Monitor for areas of redness and/or skin breakdown  2.  Assess vascular access sites hourly  3.  Every 4-6 hours minimum:  Change oxygen saturation probe site  4.  Every 4-6 hours:  If on nasal continuous positive airway pressure, respiratory therapy assess nares and determine need for appliance change or resting period.  2/27/2024 0928 by Keara Stringer RN  Outcome: Progressing     Problem: Safety - Medical Restraint  Goal: Remains free of injury from restraints (Restraint for Interference with Medical Device)  Description: INTERVENTIONS:  1. Determine that other, less restrictive measures have been tried or would not be effective before applying the restraint  2. Evaluate the patient's condition at the time of restraint application  3. Inform patient/family regarding the reason for restraint  4. Q2H: Monitor safety, psychosocial status, comfort, nutrition and hydration  2/27/2024 1922 by Sheila Gorman RN  Outcome: Progressing  Flowsheets (Taken 2/27/2024 1922)  Remains free of injury from restraints (restraint for interference with medical device):   Every 2 hours: Monitor safety, psychosocial status, comfort, nutrition and hydration   Evaluate the patient's condition at the time of restraint application   Determine that other, less restrictive measures have been tried or would not be effective before applying the restraint   Inform patient/family regarding the reason for restraint  2/27/2024 0928 by Keara Stringer, RN  Outcome: Progressing

## 2024-02-28 NOTE — PLAN OF CARE
Problem: Chronic Conditions and Co-morbidities  Goal: Patient's chronic conditions and co-morbidity symptoms are monitored and maintained or improved  Outcome: Progressing     Problem: Discharge Planning  Goal: Discharge to home or other facility with appropriate resources  Outcome: Progressing     Problem: Pain  Goal: Verbalizes/displays adequate comfort level or baseline comfort level  Outcome: Progressing  Flowsheets  Taken 2/28/2024 0000  Verbalizes/displays adequate comfort level or baseline comfort level: Assess pain using appropriate pain scale  Taken 2/27/2024 2000  Verbalizes/displays adequate comfort level or baseline comfort level: Assess pain using appropriate pain scale     Problem: Safety - Adult  Goal: Free from fall injury  Outcome: Progressing     Problem: Skin/Tissue Integrity  Goal: Absence of new skin breakdown  Description: 1.  Monitor for areas of redness and/or skin breakdown  2.  Assess vascular access sites hourly  3.  Every 4-6 hours minimum:  Change oxygen saturation probe site  4.  Every 4-6 hours:  If on nasal continuous positive airway pressure, respiratory therapy assess nares and determine need for appliance change or resting period.  Outcome: Progressing     Problem: Risk for Elopement  Goal: Patient will not exit the unit/facility without proper excort  Outcome: Progressing     Problem: Safety - Medical Restraint  Goal: Remains free of injury from restraints (Restraint for Interference with Medical Device)  Description: INTERVENTIONS:  1. Determine that other, less restrictive measures have been tried or would not be effective before applying the restraint  2. Evaluate the patient's condition at the time of restraint application  3. Inform patient/family regarding the reason for restraint  4. Q2H: Monitor safety, psychosocial status, comfort, nutrition and hydration  2/28/2024 0226 by Ana Garcia RN  Outcome: Progressing  Flowsheets (Taken 2/27/2024 2000)  Remains free of  injury from restraints (restraint for interference with medical device):   Determine that other, less restrictive measures have been tried or would not be effective before applying the restraint   Evaluate the patient's condition at the time of restraint application   Inform patient/family regarding the reason for restraint   Every 2 hours: Monitor safety, psychosocial status, comfort, nutrition and hydration  2/27/2024 1922 by Sheila Gorman RN  Outcome: Progressing  Flowsheets (Taken 2/27/2024 1922)  Remains free of injury from restraints (restraint for interference with medical device):   Every 2 hours: Monitor safety, psychosocial status, comfort, nutrition and hydration   Evaluate the patient's condition at the time of restraint application   Determine that other, less restrictive measures have been tried or would not be effective before applying the restraint   Inform patient/family regarding the reason for restraint     Problem: Nutrition Deficit:  Goal: Optimize nutritional status  Outcome: Progressing     Problem: Neurosensory - Adult  Goal: Achieves stable or improved neurological status  Outcome: Progressing  Goal: Absence of seizures  Outcome: Progressing  Goal: Remains free of injury related to seizures activity  Outcome: Progressing  Goal: Achieves maximal functionality and self care  Outcome: Progressing

## 2024-02-28 NOTE — PROGRESS NOTES
[facility] Intensive Care Unit  Critical Care  Daily Progress Note 2/28/2024    Date of Admission: 02/26/2024    CC:  Follow up for stroke    HOSPITAL COURSE/OVERNIGHT EVENTS:    Presented to [facility] with right side weakness. Found to have L MCA occlusion. She has PMH of factor 8 deficiency. She was treated with TNK and transferred to [facility]. She was taken to IR for thrombectomy of L ICA/ANGE/MCA. Admitted to [facility]. She was found to be in new onset a fib, started on diltiazem, also started on nicardipine gtt.  She had a repeat CT this morning which revealed hemorrhagic conversion.  On exam, she is lethargic but does open eyes to pain and moves extremities.   She was admitted to [facility] 2/22 for N/V/D,sepsis d/t pneumonia, and a fib. She signed out AMA. She has been in a fib for a year, per her mom.     2/27 no events overnight. Repeat CT this morning with increased edema, started on 3%. TF started  2/28  No new issues.  Did not require any prn antihypertensive agents.  Continues on 3% NaCl.  MRI brain showed continued evolution left MCA stroke increased midline 7 mm left to right shift.      PHYSICAL EXAM:   BP (!) 137/93   Pulse 88   Temp 97.9 °F (36.6 °C) (Temporal)   Resp 15   Ht 1.727 m (5' 8\")   Wt 113.5 kg (250 lb 3.6 oz)   SpO2 97%   BMI 38.05 kg/m²     Intake/Output Summary (Last 24 hours) at 2/28/2024 1158  Last data filed at 2/28/2024 1100  Gross per 24 hour   Intake 2135.28 ml   Output 3465 ml   Net -1329.72 ml      General appearance:  Comfortable.     NEUROLOGIC:   RASS Score:  - 2  GCS:  6  1 - Does not open eyes   4 - Moves part of body but does not remove noxious stimulus  1 - Makes no noise       Pupil size:  Left 2 mm  Right 2 mm  Pupil reaction: Yes   PERRLA  Wiggles fingers: Left   No  Right No  Hand grasp:   Left: none.   Right  none  Wiggles toes: Left   No Right   No   Plantar flexion:  Left  none Right   none    CONSTITUTIONAL: No acute distress, lying in hospital bed.    CARDIOVASCULAR:  with other team members & physicians at least 30 minutes so far today.  Please feel free to call with questions or concerns.      Electronically signed by Brianna Gomez RN MSN APRN-NP BC NP  CCNS CCRN 2/28/2024 11:58 AM

## 2024-02-28 NOTE — PROGRESS NOTES
[facility]   INTENSIVE CARE UNIT     CRITICAL CARE ATTENDING PROGRESS NOTE    I have examined the patient, reviewed the record, and discussed the case with the APN/  Resident. I have reviewed all relevant labs and imaging data.    Please refer to the  APN/ resident's note. I agree with the  assessment and plan with the following corrections/ additions. The following summarizes my clinical findings and independent assessment.     CC: Left MCA stroke    HOSPITAL COURSE:  2/26-presented [facility] with right-sided weakness.  Found to have left MCA occlusion.  Underwent TNK and subsequent mechanical thrombectomy for left ICA, ANGE and MCA.  Postop she was found to be in A-fib and started on Cardizem drip.  2/27 started on 3% hypertonic saline  2/28 remains on 3% hypertonic saline, no improvement mental status    EXAM:  Not intubated  Pupils equal round reactive light  Does not follow commands  Localizes to pain  Open eyes to voice  Heart and A-fib lungs coarse  Abdomen soft nontender nondistended    ASSESSMENT:  Principal Problem:    Acute cerebrovascular accident (CVA) due to ischemia (HCC)  Active Problems:    Atrial fibrillation with RVR (HCC)    Hypertrophic cardiomyopathy (HCC)    History of bleeding disorder    Primary hypertension    Cerebrovascular accident (CVA) (HCC)    Oropharyngeal dysphagia    Palliative care encounter    Goals of care, counseling/discussion  Resolved Problems:    * No resolved hospital problems. *       PLAN:   -I have reviewed the following  labs:  CBC:   Lab Results   Component Value Date/Time    WBC 13.5 02/28/2024 03:47 AM    RBC 4.20 02/28/2024 03:47 AM    HGB 9.5 02/28/2024 03:47 AM    HCT 32.3 02/28/2024 03:47 AM    MCV 76.9 02/28/2024 03:47 AM    MCH 22.6 02/28/2024 03:47 AM    MCHC 29.4 02/28/2024 03:47 AM    RDW 18.3 02/28/2024 03:47 AM     02/28/2024 03:47 AM    MPV 10.5 02/28/2024 03:47 AM     CMP:    Lab Results   Component Value Date/Time

## 2024-02-28 NOTE — PROGRESS NOTES
Speech Language Pathology  NAME:  Sheila Alanis  :  1975  DATE: 2024  ROOM:  3813/3813-A    Pt unavailable for Speech/ Language/ Cognitive Evaluation and Clinical Swallow Evaluation services due to:    Other: Pt unable to appropriately participate in SLP interventions at this time.     Spoke with NP, will sign off at this time, please re-consult as appropriate. Thank you.

## 2024-02-28 NOTE — PROGRESS NOTES
[facility] Hospitalist Progress Note    Synopsis: The patient is a 48-year-old female with a past medical history of hokum, Factor VIII deficiency, atrial fibrillation, DVT and TIAs who presented to [facility] for right-sided weakness.  Workup showed a left MCA stroke.  Patient was given TNK.  She was subsequently transferred to [facility] main campus.  Neuroendovascular surgery performed a thrombectomy.  Patient had a repeat CT of the head which showed a left putamen hemorrhage.  Of note patient also has A-fib RVR.  Patient is currently under ICU care.  She is on a Cardene drip.  Cardiology has also been consulted.    Subjective:  Patient is being followed for Atrial fibrillation with RVR ([facility]) [I48.91]  Cerebrovascular accident (CVA), unspecified mechanism ([facility]) [I63.9]  Acute cerebrovascular accident (CVA) due to ischemia ([facility]) [I63.9]     Somnolent  No distress      ROS: denies fever, chills, cp, sob, n/v, HA unless stated above.      magnesium sulfate  2,000 mg IntraVENous Once    potassium & sodium phosphates  2 packet Per NG tube 4x Daily    sennosides  5 mL Per NG tube Nightly    famotidine  20 mg Per NG tube BID    calcium gluconate  2,000 mg IntraVENous Once    polyethylene glycol  17 g Oral Daily    atorvastatin  40 mg Oral Nightly    carvedilol  25 mg Oral BID    hydrALAZINE  75 mg Oral 3 times per day    levETIRAcetam  500 mg IntraVENous Q12H    sodium chloride flush  5-40 mL IntraVENous BID     perflutren lipid microspheres, 1.5 mL, ONCE PRN  hydrALAZINE, 10 mg, Q30 Min PRN  labetalol, 10 mg, Q1H PRN  acetaminophen, 650 mg, Q4H PRN  ondansetron, 4 mg, Q6H PRN  sodium chloride flush, 5-40 mL, PRN         Objective:    BP (!) 170/114   Pulse 87   Temp 98 °F (36.7 °C) (Temporal)   Resp 16   Ht 1.727 m (5' 8\")   Wt 113.5 kg (250 lb 3.6 oz)   SpO2 98%   BMI 38.05 kg/m²     General Appearance: Lethargic.  Opens eyes to pain  Skin: warm and dry  Head: normocephalic and

## 2024-02-28 NOTE — CONSULTS
Palliative Care Department  [phone]  Palliative Care Initial Consult  Provider Alivia German, [facility] - YAZMIN     Sheila Alanis  [id number]  Hospital Day: 4  Date of Initial Consult: 2/28/24  Referring Provider: Gwyn Michael MD   Palliative Medicine was consulted for assistance with: Goals of care    HPI:   Sheila Alnais is a 48 y.o. with a medical history of none on file who was admitted on 2/25/2024 from home with a CHIEF COMPLAINT of strokelike symptoms.   Patient was found unresponsive around 8 PM by her boyfriend with right hemiparesis. Patient was given TNK at [facility] for neuro evaluation. Imaging revealed large vessel occlusion and she went for thrombectomy once at [facility]. Transferred to SICU for close monitoring. 2/27 MRI of brain showed continued evolution of acute left MCA distribution infarct with 7 mm left to right midline shift which has increased compared to most recent CT, intraparenchymal body products in left temporal lobe.  Postop patient developed A-fib and was started on Cardizem drip. Palliative medicine consulted for further assistance.  ASSESSMENT/PLAN:     Pertinent Hospital Diagnoses     CVA s/p mechanical thrombectomy of left ICA, ANGE, MCA  Acute respiratory failure with hypoxia  A-fib with rapid ventricular response    Palliative Care Encounter / Counseling Regarding Goals of Care  Please see detailed goals of care discussion as below  At this time, Sheila Alanis, Does Not have capacity for medical decision-making.  Capacity is time limited and situation/question specific  During encounter Hamida was surrogate medical decision-maker  Outcome of goals of care meeting:   Continue full code and all aggressive medical management  Family okay with intubation and PEG placement if indicated   Code status Full Code  Advanced Directives: no POA or living will in [facility]  Surrogate/Legal NOK:  Hamida Hallepifaniowilberto (mother): [phone]  Andre Figueroa (boyfriend):    Constitutional: Somnolent  Lungs: CTA bilaterally, no audible rhonchi or wheezes noted, respirations unlabored, no retractions  Heart: RRR, no murmur, rub, or gallop noted during exam  Abd:  Soft, non tender, non distended, bowel sounds present  Neuro: Somnolent, not following commands    Objective data reviewed: labs, images, records, medication use, vitals, and chart    Discussed patient and the plan of care with the other IDT members: Palliative Medicine IDT Team, Primary Team, Floor Nurse, and Family    Time/Communication  Greater than 50% of time spent, total 55 minutes in counseling and coordination of care at the bedside regarding goals of care and diagnosis and prognosis.    Thank you for allowing Palliative Medicine to participate in the care of Sheila Alanis.

## 2024-02-28 NOTE — PROGRESS NOTES
When unrestrained pt reaches for NGT with left hand. Left soft wrist restraint continued for safety.

## 2024-02-28 NOTE — CARE COORDINATION
2/28 Care Coordination:Pt remains in [facility]. S/P  left MCA stroke. Patient was given TNK. She was subsequently transferred to [facility] main [facility]. Neuroendovascular surgery performed a thrombectomy.  Pt remains on IV Amiodarone and Cardizem drips, Afib. Pt does not follow commands, In restraints, NG TF's. Will obtain a Palliative Care consult. With Current status unclear on discharge needs. No family here. Will await Palliative care to see pt and goals of care are established.  CM/SW will continue to follow for discharge planning.   Del AGUDELO,RN--BC  [phone]

## 2024-02-28 NOTE — PLAN OF CARE
Problem: Neurosensory - Adult  Goal: Achieves stable or improved neurological status  2/28/2024 1015 by Keara Stringer RN  Outcome: Not Progressing     Problem: Neurosensory - Adult  Goal: Achieves maximal functionality and self care  2/28/2024 1015 by Keara Stringer RN  Outcome: Not Progressing     Problem: Safety - Medical Restraint  Goal: Remains free of injury from restraints (Restraint for Interference with Medical Device)  Description: INTERVENTIONS:  1. Determine that other, less restrictive measures have been tried or would not be effective before applying the restraint  2. Evaluate the patient's condition at the time of restraint application  3. Inform patient/family regarding the reason for restraint  4. Q2H: Monitor safety, psychosocial status, comfort, nutrition and hydration  2/28/2024 1015 by Keara Stringer RN  Outcome: Progressing     Problem: Nutrition Deficit:  Goal: Optimize nutritional status  2/28/2024 1015 by Keara Stringer RN  Outcome: Progressing     Problem: Neurosensory - Adult  Goal: Achieves stable or improved neurological status  2/28/2024 1015 by Keara Stringer RN  Outcome: Not Progressing  2/28/2024 0226 by Ana Garcia RN  Outcome: Progressing  Flowsheets (Taken 2/27/2024 2000)  Achieves stable or improved neurological status:   Assess for and report changes in neurological status   Initiate measures to prevent increased intracranial pressure   Maintain blood pressure and fluid volume within ordered parameters to optimize cerebral perfusion and minimize risk of hemorrhage   Monitor temperature, glucose, and sodium. Initiate appropriate interventions as ordered  Goal: Achieves maximal functionality and self care  2/28/2024 1015 by Keara Stringer RN  Outcome: Not Progressing  2/28/2024 0226 by Ana Garcia RN  Outcome: Progressing  Flowsheets (Taken 2/27/2024 2000)  Achieves maximal functionality and self care:   Monitor swallowing and airway patency with patient

## 2024-02-28 NOTE — PROGRESS NOTES
Patient displaying involuntary movements of the left arm, placing the patient at risk for dislodging support lines and NGT. Soft left wrist restraint continued at this time for patient safety.

## 2024-02-29 ENCOUNTER — APPOINTMENT (OUTPATIENT)
Dept: GENERAL RADIOLOGY | Age: 49
End: 2024-02-29
Payer: COMMERCIAL

## 2024-02-29 LAB
ALBUMIN SERPL-MCNC: 4 G/DL (ref 3.5–5.2)
ALP SERPL-CCNC: 96 U/L (ref 35–104)
ALT SERPL-CCNC: 26 U/L (ref 0–32)
ANION GAP SERPL CALCULATED.3IONS-SCNC: 17 MMOL/L (ref 7–16)
AST SERPL-CCNC: 29 U/L (ref 0–31)
BASOPHILS # BLD: 0.05 K/UL (ref 0–0.2)
BASOPHILS NFR BLD: 0 % (ref 0–2)
BILIRUB SERPL-MCNC: 0.8 MG/DL (ref 0–1.2)
BUN SERPL-MCNC: 11 MG/DL (ref 6–20)
CA-I BLD-SCNC: 1.17 MMOL/L (ref 1.15–1.33)
CALCIUM SERPL-MCNC: 8.9 MG/DL (ref 8.6–10.2)
CHLORIDE SERPL-SCNC: 106 MMOL/L (ref 98–107)
CO2 SERPL-SCNC: 22 MMOL/L (ref 22–29)
CREAT SERPL-MCNC: 0.6 MG/DL (ref 0.5–1)
EOSINOPHIL # BLD: 0.04 K/UL (ref 0.05–0.5)
EOSINOPHILS RELATIVE PERCENT: 0 % (ref 0–6)
ERYTHROCYTE [DISTWIDTH] IN BLOOD BY AUTOMATED COUNT: 18.2 % (ref 11.5–15)
GFR SERPL CREATININE-BSD FRML MDRD: >60 ML/MIN/1.73M2
GLUCOSE BLD-MCNC: 164 MG/DL (ref 74–99)
GLUCOSE BLD-MCNC: 165 MG/DL (ref 74–99)
GLUCOSE BLD-MCNC: 178 MG/DL (ref 74–99)
GLUCOSE BLD-MCNC: 209 MG/DL (ref 74–99)
GLUCOSE SERPL-MCNC: 174 MG/DL (ref 74–99)
HCT VFR BLD AUTO: 38.7 % (ref 34–48)
HGB BLD-MCNC: 11.6 G/DL (ref 11.5–15.5)
IMM GRANULOCYTES # BLD AUTO: 0.17 K/UL (ref 0–0.58)
IMM GRANULOCYTES NFR BLD: 1 % (ref 0–5)
LYMPHOCYTES NFR BLD: 1.87 K/UL (ref 1.5–4)
LYMPHOCYTES RELATIVE PERCENT: 11 % (ref 20–42)
MAGNESIUM SERPL-MCNC: 1.9 MG/DL (ref 1.6–2.6)
MCH RBC QN AUTO: 22.8 PG (ref 26–35)
MCHC RBC AUTO-ENTMCNC: 30 G/DL (ref 32–34.5)
MCV RBC AUTO: 76.2 FL (ref 80–99.9)
MONOCYTES NFR BLD: 1.03 K/UL (ref 0.1–0.95)
MONOCYTES NFR BLD: 6 % (ref 2–12)
NEUTROPHILS NFR BLD: 82 % (ref 43–80)
NEUTS SEG NFR BLD: 14.06 K/UL (ref 1.8–7.3)
PHOSPHATE SERPL-MCNC: 3.3 MG/DL (ref 2.5–4.5)
PLATELET # BLD AUTO: 417 K/UL (ref 130–450)
PMV BLD AUTO: 10.2 FL (ref 7–12)
POTASSIUM SERPL-SCNC: 3.6 MMOL/L (ref 3.5–5)
PROT SERPL-MCNC: 7 G/DL (ref 6.4–8.3)
RBC # BLD AUTO: 5.08 M/UL (ref 3.5–5.5)
SODIUM SERPL-SCNC: 138 MMOL/L (ref 132–146)
SODIUM SERPL-SCNC: 143 MMOL/L (ref 132–146)
SODIUM SERPL-SCNC: 145 MMOL/L (ref 132–146)
WBC OTHER # BLD: 17.2 K/UL (ref 4.5–11.5)

## 2024-02-29 PROCEDURE — 87070 CULTURE OTHR SPECIMN AEROBIC: CPT

## 2024-02-29 PROCEDURE — 2580000003 HC RX 258: Performed by: STUDENT IN AN ORGANIZED HEALTH CARE EDUCATION/TRAINING PROGRAM

## 2024-02-29 PROCEDURE — 99232 SBSQ HOSP IP/OBS MODERATE 35: CPT | Performed by: NURSE PRACTITIONER

## 2024-02-29 PROCEDURE — 2580000003 HC RX 258: Performed by: INTERNAL MEDICINE

## 2024-02-29 PROCEDURE — 31720 CLEARANCE OF AIRWAYS: CPT

## 2024-02-29 PROCEDURE — 71045 X-RAY EXAM CHEST 1 VIEW: CPT

## 2024-02-29 PROCEDURE — 36592 COLLECT BLOOD FROM PICC: CPT

## 2024-02-29 PROCEDURE — 83735 ASSAY OF MAGNESIUM: CPT

## 2024-02-29 PROCEDURE — 6360000002 HC RX W HCPCS: Performed by: NURSE PRACTITIONER

## 2024-02-29 PROCEDURE — 99232 SBSQ HOSP IP/OBS MODERATE 35: CPT | Performed by: INTERNAL MEDICINE

## 2024-02-29 PROCEDURE — 6370000000 HC RX 637 (ALT 250 FOR IP): Performed by: NURSE PRACTITIONER

## 2024-02-29 PROCEDURE — 6360000002 HC RX W HCPCS: Performed by: CLINICAL NURSE SPECIALIST

## 2024-02-29 PROCEDURE — 82962 GLUCOSE BLOOD TEST: CPT

## 2024-02-29 PROCEDURE — 87205 SMEAR GRAM STAIN: CPT

## 2024-02-29 PROCEDURE — 2580000003 HC RX 258: Performed by: NURSE PRACTITIONER

## 2024-02-29 PROCEDURE — 84295 ASSAY OF SERUM SODIUM: CPT

## 2024-02-29 PROCEDURE — 2500000003 HC RX 250 WO HCPCS: Performed by: STUDENT IN AN ORGANIZED HEALTH CARE EDUCATION/TRAINING PROGRAM

## 2024-02-29 PROCEDURE — 2700000000 HC OXYGEN THERAPY PER DAY

## 2024-02-29 PROCEDURE — 80053 COMPREHEN METABOLIC PANEL: CPT

## 2024-02-29 PROCEDURE — 85025 COMPLETE CBC W/AUTO DIFF WBC: CPT

## 2024-02-29 PROCEDURE — 82330 ASSAY OF CALCIUM: CPT

## 2024-02-29 PROCEDURE — 84100 ASSAY OF PHOSPHORUS: CPT

## 2024-02-29 PROCEDURE — 37799 UNLISTED PX VASCULAR SURGERY: CPT

## 2024-02-29 PROCEDURE — 99291 CRITICAL CARE FIRST HOUR: CPT | Performed by: SURGERY

## 2024-02-29 PROCEDURE — 2580000003 HC RX 258: Performed by: SURGERY

## 2024-02-29 PROCEDURE — 6360000002 HC RX W HCPCS: Performed by: SURGERY

## 2024-02-29 PROCEDURE — 6360000002 HC RX W HCPCS: Performed by: STUDENT IN AN ORGANIZED HEALTH CARE EDUCATION/TRAINING PROGRAM

## 2024-02-29 PROCEDURE — 2000000000 HC ICU R&B

## 2024-02-29 RX ORDER — LORAZEPAM 2 MG/ML
1 INJECTION INTRAMUSCULAR EVERY 30 MIN PRN
Status: DISCONTINUED | OUTPATIENT
Start: 2024-02-29 | End: 2024-03-01 | Stop reason: HOSPADM

## 2024-02-29 RX ORDER — LORAZEPAM 2 MG/ML
1 INJECTION INTRAMUSCULAR EVERY 6 HOURS PRN
Status: DISCONTINUED | OUTPATIENT
Start: 2024-02-29 | End: 2024-02-29

## 2024-02-29 RX ORDER — 0.9 % SODIUM CHLORIDE 0.9 %
500 INTRAVENOUS SOLUTION INTRAVENOUS ONCE
Status: COMPLETED | OUTPATIENT
Start: 2024-02-29 | End: 2024-02-29

## 2024-02-29 RX ORDER — GLYCOPYRROLATE 0.2 MG/ML
0.2 INJECTION INTRAMUSCULAR; INTRAVENOUS EVERY 4 HOURS PRN
Status: DISCONTINUED | OUTPATIENT
Start: 2024-02-29 | End: 2024-03-01 | Stop reason: HOSPADM

## 2024-02-29 RX ADMIN — SODIUM CHLORIDE 10 MG/HR: 900 INJECTION, SOLUTION INTRAVENOUS at 02:20

## 2024-02-29 RX ADMIN — HYDRALAZINE HYDROCHLORIDE 75 MG: 50 TABLET ORAL at 05:34

## 2024-02-29 RX ADMIN — LEVETIRACETAM 500 MG: 100 INJECTION INTRAVENOUS at 09:27

## 2024-02-29 RX ADMIN — INSULIN LISPRO 1 UNITS: 100 INJECTION, SOLUTION INTRAVENOUS; SUBCUTANEOUS at 12:06

## 2024-02-29 RX ADMIN — POLYETHYLENE GLYCOL 3350 17 G: 17 POWDER, FOR SOLUTION ORAL at 09:28

## 2024-02-29 RX ADMIN — HYDROMORPHONE HYDROCHLORIDE 0.5 MG: 1 INJECTION, SOLUTION INTRAMUSCULAR; INTRAVENOUS; SUBCUTANEOUS at 17:12

## 2024-02-29 RX ADMIN — LORAZEPAM 1 MG: 2 INJECTION INTRAMUSCULAR; INTRAVENOUS at 17:00

## 2024-02-29 RX ADMIN — LORAZEPAM 1 MG: 2 INJECTION INTRAMUSCULAR; INTRAVENOUS at 21:48

## 2024-02-29 RX ADMIN — SODIUM CHLORIDE 500 ML: 9 INJECTION, SOLUTION INTRAVENOUS at 15:44

## 2024-02-29 RX ADMIN — HYDRALAZINE HYDROCHLORIDE 75 MG: 50 TABLET ORAL at 13:27

## 2024-02-29 RX ADMIN — LEVETIRACETAM 500 MG: 100 INJECTION INTRAVENOUS at 20:31

## 2024-02-29 RX ADMIN — LORAZEPAM 1 MG: 2 INJECTION INTRAMUSCULAR; INTRAVENOUS at 19:33

## 2024-02-29 RX ADMIN — HYDROMORPHONE HYDROCHLORIDE 0.5 MG: 1 INJECTION, SOLUTION INTRAMUSCULAR; INTRAVENOUS; SUBCUTANEOUS at 18:22

## 2024-02-29 RX ADMIN — HYDROMORPHONE HYDROCHLORIDE 0.25 MG: 1 INJECTION, SOLUTION INTRAMUSCULAR; INTRAVENOUS; SUBCUTANEOUS at 15:54

## 2024-02-29 RX ADMIN — HYDROMORPHONE HYDROCHLORIDE 0.25 MG: 1 INJECTION, SOLUTION INTRAMUSCULAR; INTRAVENOUS; SUBCUTANEOUS at 15:38

## 2024-02-29 RX ADMIN — AMIODARONE HYDROCHLORIDE 0.5 MG/MIN: 50 INJECTION, SOLUTION INTRAVENOUS at 16:37

## 2024-02-29 RX ADMIN — SODIUM CHLORIDE, PRESERVATIVE FREE 10 ML: 5 INJECTION INTRAVENOUS at 09:28

## 2024-02-29 RX ADMIN — SODIUM CHLORIDE 15 MG/HR: 900 INJECTION, SOLUTION INTRAVENOUS at 14:26

## 2024-02-29 RX ADMIN — LABETALOL HYDROCHLORIDE 10 MG: 5 INJECTION INTRAVENOUS at 15:07

## 2024-02-29 RX ADMIN — HYDROMORPHONE HYDROCHLORIDE 0.5 MG: 1 INJECTION, SOLUTION INTRAMUSCULAR; INTRAVENOUS; SUBCUTANEOUS at 21:49

## 2024-02-29 RX ADMIN — LORAZEPAM 1 MG: 2 INJECTION INTRAMUSCULAR; INTRAVENOUS at 18:22

## 2024-02-29 RX ADMIN — FAMOTIDINE 20 MG: 20 TABLET, FILM COATED ORAL at 09:28

## 2024-02-29 RX ADMIN — CEFTRIAXONE SODIUM 2000 MG: 2 INJECTION, POWDER, FOR SOLUTION INTRAMUSCULAR; INTRAVENOUS at 10:58

## 2024-02-29 RX ADMIN — LORAZEPAM 1 MG: 2 INJECTION INTRAMUSCULAR; INTRAVENOUS at 17:38

## 2024-02-29 RX ADMIN — AMIODARONE HYDROCHLORIDE 0.5 MG/MIN: 50 INJECTION, SOLUTION INTRAVENOUS at 01:10

## 2024-02-29 RX ADMIN — GLYCOPYRROLATE 0.2 MG: 0.2 INJECTION INTRAMUSCULAR; INTRAVENOUS at 19:34

## 2024-02-29 RX ADMIN — HYDROMORPHONE HYDROCHLORIDE 0.5 MG: 1 INJECTION, SOLUTION INTRAMUSCULAR; INTRAVENOUS; SUBCUTANEOUS at 19:33

## 2024-02-29 RX ADMIN — CARVEDILOL 25 MG: 25 TABLET, FILM COATED ORAL at 09:28

## 2024-02-29 ASSESSMENT — PAIN SCALES - GENERAL
PAINLEVEL_OUTOF10: 0

## 2024-02-29 NOTE — PROGRESS NOTES
Pt assessed, began abdominal breathing heavily. NP Brianna at bedside. Coordinating with hospice nurse for end of life orders high RR, high HR, decreasing O2 saturation.

## 2024-02-29 NOTE — PLAN OF CARE
Problem: Chronic Conditions and Co-morbidities  Goal: Patient's chronic conditions and co-morbidity symptoms are monitored and maintained or improved  Outcome: Progressing     Problem: Discharge Planning  Goal: Discharge to home or other facility with appropriate resources  Outcome: Progressing     Problem: Pain  Goal: Verbalizes/displays adequate comfort level or baseline comfort level  Outcome: Progressing     Problem: Safety - Adult  Goal: Free from fall injury  Outcome: Progressing     Problem: Skin/Tissue Integrity  Goal: Absence of new skin breakdown  Outcome: Progressing     Problem: Risk for Elopement  Goal: Patient will not exit the unit/facility without proper excort  Outcome: Progressing  Flowsheets  Taken 2/28/2024 1600 by Kiersten Byrne RN  Nursing Interventions for Elopement Risk:   Collaborate with family members/caregivers to mitigate the elopement risk   Collaborate with treatment team for drug withdrawal symptoms treatment   Collaborate with treatment team for nicotine replacement   Assist with personal care needs such as toileting, eating, dressing, as needed to reduce the risk of wandering  Taken 2/28/2024 1400 by Keara Stringer, RN  Nursing Interventions for Elopement Risk:   Collaborate with family members/caregivers to mitigate the elopement risk   Collaborate with treatment team for drug withdrawal symptoms treatment   Collaborate with treatment team for nicotine replacement   Assist with personal care needs such as toileting, eating, dressing, as needed to reduce the risk of wandering  Taken 2/28/2024 1200 by Keara Stringer, RN  Nursing Interventions for Elopement Risk:   Collaborate with family members/caregivers to mitigate the elopement risk   Collaborate with treatment team for drug withdrawal symptoms treatment   Collaborate with treatment team for nicotine replacement   Assist with personal care needs such as toileting, eating, dressing, as needed to reduce the risk of wandering

## 2024-02-29 NOTE — PROGRESS NOTES
Palliative Care Department  [phone]  Palliative Care Progress Note  Provider Alivia German, APRN - CNP     Sheila Alanis  [id number]  Hospital Day: 5  Date of Initial Consult: 2/28/24  Referring Provider: Gwyn Michael MD   Palliative Medicine was consulted for assistance with: Goals of care    HPI:   Sheila Alanis is a 48 y.o. with a medical history of none on file who was admitted on 2/25/2024 from home with a CHIEF COMPLAINT of strokelike symptoms.   Patient was found unresponsive around 8 PM by her boyfriend with right hemiparesis. Patient was given TNK at [facility] for neuro evaluation. Imaging revealed large vessel occlusion and she went for thrombectomy once at [facility]. Transferred to SICU for close monitoring. 2/27 MRI of brain showed continued evolution of acute left MCA distribution infarct with 7 mm left to right midline shift which has increased compared to most recent CT, intraparenchymal body products in left temporal lobe.  Postop patient developed A-fib and was started on Cardizem drip. Palliative medicine consulted for further assistance.  ASSESSMENT/PLAN:     Pertinent Hospital Diagnoses     CVA s/p mechanical thrombectomy of left ICA, ANGE, MCA  Acute respiratory failure with hypoxia  A-fib with rapid ventricular response    Palliative Care Encounter / Counseling Regarding Goals of Care  Please see detailed goals of care discussion as below  At this time, Sheila Alanis, Does Not have capacity for medical decision-making. Capacity is time limited and situation/question specific  During encounter Hamida was surrogate medical decision-maker  Outcome of goals of care meeting:   Family discussing code status/comfort care options  Code status Full Code  Advanced Directives: no POA or living will in [facility]  Surrogate/Legal NOK:  Hamida Kelly (mother): [phone]  Andre Figueroa (boyfriend): [phone]    Spiritual assessment: no spiritual distress identified  Bereavement  and grief: to be determined  Referrals to: none today  SUBJECTIVE:     Current medical issues leading to Palliative Medicine involvement include   Active Hospital Problems    Diagnosis Date Noted    Oropharyngeal dysphagia [R13.12] 02/28/2024    Palliative care encounter [Z51.5] 02/28/2024    Goals of care, counseling/discussion [Z71.89] 02/28/2024    Hypertrophic cardiomyopathy (HCC) [I42.2] 02/27/2024    History of bleeding disorder [Z86.2] 02/27/2024    Primary hypertension [I10] 02/27/2024    Cerebrovascular accident (CVA) (HCC) [I63.9] 02/27/2024    Atrial fibrillation with RVR (HCC) [I48.91] 02/26/2024    Acute cerebrovascular accident (CVA) due to ischemia (HCC) [I63.9] 02/25/2024       Details of Conversation:    Chart reviewed. Case reviewed with Brianna JACOB. Update received from nursing. Patient seen resting in bed somnolent. No family present at bedside. Spoke with mother, bother and aunt on the phone. Discussed current condition to include MCA s/p thrombectomy with poor prognosis. Discussed code status options to include comfort care/hospice care. Family would like some time to discuss privately and will call palliative medicine back with final decisions.  Extensive amount of time spent providing emotional support and answering all questions.  Will continue to follow for ongoing goals of care discussions as well as support for the patient and family.    Addendum:  Call back received from mother, Hamida. Hamida states that she has been able to speak with family and decision has been made to change CODE STATUS to limited-DNR CCA/DNI. She would also like to speak with hospice for further planning. Hospice liaison and nursing updated on plan.  Will await hospice conversation for final planning. Will continue to follow    OBJECTIVE:   Prognosis: Poor    Physical Exam:  BP (!) 153/120   Pulse (!) 122   Temp 99.9 °F (37.7 °C) (Axillary)   Resp 21   Ht 1.727 m (5' 8\")   Wt 113.5 kg (250 lb 3.6

## 2024-02-29 NOTE — PROGRESS NOTES
Consult received and chart reviewed. Visit made to the bedside. No family present at this time. Will place call to mom who is in [place].     Electronically signed by Ann-Marie Campbell RN on 2/29/2024 at 3:40 PM  [phone]

## 2024-02-29 NOTE — PROGRESS NOTES
Visit made to the bedside. Pt in poor condition, high RR, high HR, Low O2 level. While speaking with family the pt's mother informed hospice that pt was an organ donor. Call placed to [facility]. They explained to [facility] liaison that because pt is not intubated she can not be an organ donor but pt can still be tissue donor upon cardiac death. [facility] stated they were updating the bedside nurse. Call placed to [facility] for medication changes. Pt will go to  when bed availability. Updated bedside nurse.     Electronically signed by Ann-Marie Campbell RN on 2/29/2024 at 5:26 PM  [phone]

## 2024-02-29 NOTE — FLOWSHEET NOTE
[facility] CALLED AND NOTIFIED OF PT\"S POOR PROGNOSIS, [facility] STATES TO CALL BACK WITH CARDIAC TIME OF DEATH SINCE PT IS NOT ON VENTILATOR

## 2024-02-29 NOTE — PLAN OF CARE
Problem: Safety - Medical Restraint  Goal: Remains free of injury from restraints (Restraint for Interference with Medical Device)  Description: INTERVENTIONS:  1. Determine that other, less restrictive measures have been tried or would not be effective before applying the restraint  2. Evaluate the patient's condition at the time of restraint application  3. Inform patient/family regarding the reason for restraint  4. Q2H: Monitor safety, psychosocial status, comfort, nutrition and hydration  2/29/2024 1136 by Kiersten Byrne, RN  Outcome: Completed  Flowsheets (Taken 2/29/2024 1013)  Remains free of injury from restraints (restraint for interference with medical device):   Determine that other, less restrictive measures have been tried or would not be effective before applying the restraint   Evaluate the patient's condition at the time of restraint application   Inform patient/family regarding the reason for restraint   Every 2 hours: Monitor safety, psychosocial status, comfort, nutrition and hydration  2/29/2024 0140 by Shanique Majano RN  Outcome: Progressing  Flowsheets (Taken 2/28/2024 1600 by Kiersten Byrne RN)  Remains free of injury from restraints (restraint for interference with medical device):   Determine that other, less restrictive measures have been tried or would not be effective before applying the restraint   Evaluate the patient's condition at the time of restraint application   Inform patient/family regarding the reason for restraint   Every 2 hours: Monitor safety, psychosocial status, comfort, nutrition and hydration

## 2024-02-29 NOTE — PROGRESS NOTES
OCCUPATIONAL THERAPY    Date:2024  Patient Name: Sheila Alanis  MRN: [id number]  : 1975  Room: [facility]3/3813-A              Chart reviewed. Pt not appropriate for therapy at this time.  Will discontinue OT order; please re-order when indicated. Thank you for consult.    Blessing Watt, OTR/L [id number]

## 2024-02-29 NOTE — PROGRESS NOTES
[facility]   INTENSIVE CARE UNIT     CRITICAL CARE ATTENDING PROGRESS NOTE    I have examined the patient, reviewed the record, and discussed the case with the APN/  Resident. I have reviewed all relevant labs and imaging data.    Please refer to the  APN/ resident's note. I agree with the  assessment and plan with the following corrections/ additions. The following summarizes my clinical findings and independent assessment.     CC: Left MCA stroke    HOSPITAL COURSE:  2/26-presented [facility] with right-sided weakness.  Found to have left MCA occlusion.  Underwent TNK and subsequent mechanical thrombectomy for left ICA, ANGE and MCA.  Postop she was found to be in A-fib and started on Cardizem drip.  2/27 started on 3% hypertonic saline  2/28 remains on 3% hypertonic saline, no improvement mental status  2/29 remains minimally responsive    EXAM:  Not intubated  Pupils equal round reactive light  Randomly moves left arm  Moves to pain  Heart and A-fib lungs coarse  Abdomen soft nontender nondistended    ASSESSMENT:  Principal Problem:    Acute cerebrovascular accident (CVA) due to ischemia (HCC)  Active Problems:    Atrial fibrillation with RVR (HCC)    Hypertrophic cardiomyopathy (HCC)    History of bleeding disorder    Primary hypertension    Cerebrovascular accident (CVA) (HCC)    Oropharyngeal dysphagia    Palliative care encounter    Goals of care, counseling/discussion  Resolved Problems:    * No resolved hospital problems. *       PLAN:   -I have reviewed the following  labs:  CBC:   Lab Results   Component Value Date/Time    WBC 17.2 02/29/2024 05:16 AM    RBC 5.08 02/29/2024 05:16 AM    HGB 11.6 02/29/2024 05:16 AM    HCT 38.7 02/29/2024 05:16 AM    MCV 76.2 02/29/2024 05:16 AM    MCH 22.8 02/29/2024 05:16 AM    MCHC 30.0 02/29/2024 05:16 AM    RDW 18.2 02/29/2024 05:16 AM     02/29/2024 05:16 AM    MPV 10.2 02/29/2024 05:16 AM     CMP:    Lab Results   Component Value Date/Time      02/29/2024 05:16 AM    K 3.6 02/29/2024 05:16 AM     02/29/2024 05:16 AM    CO2 22 02/29/2024 05:16 AM    BUN 11 02/29/2024 05:16 AM    CREATININE 0.6 02/29/2024 05:16 AM    LABGLOM >60 02/29/2024 05:16 AM    GLUCOSE 174 02/29/2024 05:16 AM    PROT 7.0 02/29/2024 05:16 AM    LABALBU 4.0 02/29/2024 05:16 AM    CALCIUM 8.9 02/29/2024 05:16 AM    BILITOT 0.8 02/29/2024 05:16 AM    ALKPHOS 96 02/29/2024 05:16 AM    AST 29 02/29/2024 05:16 AM    ALT 26 02/29/2024 05:16 AM          - Left MCA cerebrovascular event-status post TNK and thrombectomy with hemorrhagic conversion.  CT head from this morning shows worsening edema and shift  Continue 3% hypertonic saline  Continue monitor neurostatus    CV: New onset [id number] Cardizem drip and amiodarone drip.  Continue Coreg      Pulmonary: Acute hypoxic respiratory failure-remains on 10 L nasal cannula-monitor respiratory status-at high risk for intubation    GI: N.p.o.-continue tube feeds  Refeeding syndrome-replace phosphorus    FEN: Monitor renal function  Target hyernatremia in order to decrease cerebral edema  Last sodium 145-continue hypertonic saline at 40 cc/h  Overall fluid balance negative    ID: Rising white blood cell count/thick secretions noted-send tracheal aspirate.  Start Rocephin 2 g IV every 24  Check chest x-ray    Heme: Monitor CBC    Endo: Monitor Blood Sugars. Target blood glucose less than 180 in the ICU.      DVT prophylaxis--SCDS,    GI Prophylaxis--Pepcid  Lines--right IJ triple-lumen catheter-2/27  CODE: FULL-reviewed palliative medicine progress note from 2/he 8    DISPOSITION-Continue ICU Care    Critical care time exclusive of teaching and procedures =37 min     I provided critical care to a patient with neurological insult (left MCA infarct with cerebral edema, new onset A-fib) requiring frequent and emergent imaging, lab studies, intensive monitoring and data review as well as urgent coordination with multiple

## 2024-02-29 NOTE — PROGRESS NOTES
02/29/24 1330   Encounter Summary   Encounter Overview/Reason  Spiritual/Emotional Needs   Service Provided For: Patient;Family   Referral/Consult From: Palliative Care;Hospice   Support System Parent;Family members   Last Encounter  02/29/24  (P-DL)   Complexity of Encounter High   Spiritual/Emotional needs   Type Spiritual Support   Grief, Loss, and Adjustments   Type Hospice   Palliative Care   Type Palliative Care, Initial/Spiritual Assessment;Palliative Care, Family Care   Assessment/Intervention/Outcome   Assessment Unable to assess  (pt unable to respond)   Intervention Read/Provided Scripture;Prayer (assurance of)/[place]     Sheila is unable to respond prayers and Psalm 23 recited at bedside. I call her Mom and her plan to arrive tomorrow night. Prayers were said and she appreciated.    Chaplain Mercedes Morgan, [facility], [facility] Contact at Ext: [phone]

## 2024-02-29 NOTE — PROGRESS NOTES
[facility]  Inpatient Progress Report  PATIENT: Sheila Alanis  MEDICAL RECORD NUMBER: [id number]  DATE OF SERVICE:  2024  ATTENDING ELECTROPHYSIOLOGIST: Arlene Cutler MD   PRIMARY ELECTROPHYSIOLOGIST: Arlene Cutler MD  REFERRING PHYSICIAN: No ref. provider found and Potocki, Joseph A, DO  CHIEF COMPLAINT: Altered mental status, strokelike symptoms    HPI: This is a 48 y.o. female with a history of longstanding hypertension, DM, Obesity, bleeding disorder ,Factor VIII deficiency/antiphospholipid AB , Tobacco abuse, HLD, TIA 2018, depression, + family hx (mother  in 50's CAD/DM), and medication noncompliance.   Patient is followed by Dr. Kuo and -Dr. Aguirre for hypertrophic cardiomyopathy, need for ICD implant.  She has had multiple emergency room and hospital visits in the past 3 to 4 months   On 2023 she was seen at [facility] for slurred speech and strokelike symptoms.  Noted of chest pain.  Wearing LifeVest.  No focal neurological deficits noted on exam.  Admission recommended, patient eloped.  2023 for slurred speech and strokelike symptoms with associated chest pain.  Admission recommended, patient again left AMA.  She presented again on  to [facility] with nausea, emesis, diarrhea and myalgias.  She also complained of poor appetite, generalized fatigue, shortness of breath, productive cough and subjective fever/chills. She was found to have acute hypoxic respiratory failure in the setting of pneumonia, as well as new onset AF/AFl with RVR and uncontrolled hypertension.  She left A again 2 to 3 days later.  She now presents with changes in mental status and new neurological symptoms.  She was diagnosed with an acute ischemic left ICA, left MCA and left ANGE stroke and right hemiplegia as well as aphasia.  She has undergone acute intervention--Catheter-based cervical/cerebral angiogram with left  LVH.  Normal RV size and function.  TTE 05/02/2023 (Dr. Ndiaye): Technically adequate study.  Severe concentric LVH.  Grade 2 diastolic dysfunction.  Absence of valvular heart disease.  EF 65-70%  Lexiscan MPS 05/02/2023: The myocardial perfusion imaging was normal with soft tissue attenuation.  Gated SPECT LVEF was calculated to be 65% with normal myocardial wall motion.    No family history on file.    Social History     Tobacco Use    Smoking status: Not on file    Smokeless tobacco: Not on file   Substance Use Topics    Alcohol use: Not on file       Current Facility-Administered Medications   Medication Dose Route Frequency Provider Last Rate Last Admin    cefTRIAXone (ROCEPHIN) 2,000 mg in sterile water 20 mL IV syringe  2,000 mg IntraVENous Q24H Gwyn Michael MD        sennosides (SENOKOT) 8.8 MG/5ML syrup 5 mL  5 mL Per NG tube Nightly Brianna Gomez APRN - CNS        famotidine (PEPCID) tablet 20 mg  20 mg Per NG tube BID Brianna Gomez APRN - CNS   20 mg at 02/29/24 0928    insulin lispro (HUMALOG) injection vial 0-4 Units  0-4 Units SubCUTAneous Q4H Brianna Gomez APRN - CNS   2 Units at 02/28/24 1257    glucose chewable tablet 16 g  4 tablet Oral PRN Brianna Gomez APRN - CNS        dextrose bolus 10% 125 mL  125 mL IntraVENous PRN Brianna Gomez APRN - CNS        Or    dextrose bolus 10% 250 mL  250 mL IntraVENous PRN Brianna Gomez APRN - CNS        glucagon injection 1 mg  1 mg SubCUTAneous PRN Brianna Gomez APRN - CNS        dextrose 10 % infusion   IntraVENous Continuous PRN Brianna Gomez APRN - CNS        hydrALAZINE (APRESOLINE) tablet 75 mg  75 mg Per NG tube 3 times per day Brianna Gomez APRN - CNS   75 mg at 02/29/24 0534    carvedilol (COREG) tablet 25 mg  25 mg Per NG tube BID Brianna Gomez APRN - CNS   25 mg at 02/29/24 0928    atorvastatin (LIPITOR) tablet 40 mg  40 mg Per NG tube Nightly Brianna Gomez APRN - CNS   40 mg at 02/28/24 1854    polyethylene glycol

## 2024-02-29 NOTE — PROGRESS NOTES
Hospice consult noted. Agency choices reviewed with pts mother Hamida. She chooses [facility]. Referral made to [facility] per family choice.

## 2024-02-29 NOTE — PROGRESS NOTES
Hospice nurse was a bedside. Coordinated with family for end of life care.     Hospice nurse states to leave all drips as they are for now. Give PRNs as needed to try and keep patient comfortable. Plan to transfer to hospice house in morning.

## 2024-02-29 NOTE — PROGRESS NOTES
Received update from hospice nurse. Patient is uncomfortable, agitated. New orders placed.     REGULO Martin - CNP  Palliative Medicine

## 2024-02-29 NOTE — PROGRESS NOTES
[facility] Intensive Care Unit  Critical Care  Daily Progress Note 2/29/2024    Date of Admission: 02/26/2024    CC:  Follow up for stroke    HOSPITAL COURSE/OVERNIGHT EVENTS:    Presented to [facility] with right side weakness. Found to have L MCA occlusion. She has PMH of factor 8 deficiency. She was treated with TNK and transferred to [facility]. She was taken to IR for thrombectomy of L ICA/ANGE/MCA. Admitted to [facility]. She was found to be in new onset a fib, started on diltiazem, also started on nicardipine gtt.  She had a repeat CT this morning which revealed hemorrhagic conversion.  On exam, she is lethargic but does open eyes to pain and moves extremities.   She was admitted to [facility] 2/22 for N/V/D,sepsis d/t pneumonia, and a fib. She signed out AMA. She has been in a fib for a year, per her mom.     2/27 no events overnight. Repeat CT this morning with increased edema, started on 3%. TF started  2/28  No new issues.  Did not require any prn antihypertensive agents.  Continues on 3% NaCl.  MRI brain showed continued evolution left MCA stroke increased midline 7 mm left to right shift.    2/29  No acute issues overnight.  Required 1 dose of antihypertensive agents. Remains on amiodarone, Cardizem & 3% NaCl.    PHYSICAL EXAM:   BP (!) 134/106   Pulse (!) 112   Temp 97.8 °F (36.6 °C) (Temporal)   Resp 22   Ht 1.727 m (5' 8\")   Wt 113.5 kg (250 lb 3.6 oz)   SpO2 92%   BMI 38.05 kg/m²     Intake/Output Summary (Last 24 hours) at 2/29/2024 0639  Last data filed at 2/29/2024 0618  Gross per 24 hour   Intake 1483.38 ml   Output 4075 ml   Net -2591.62 ml      General appearance:  Comfortable.     NEUROLOGIC:   RASS Score:  - 2  GCS:  6  1 - Does not open eyes   4 - Moves part of body but does not remove noxious stimulus  1 - Makes no noise       Pupil size:  Left 3 mm  Right 3 mm  Pupil reaction: Yes   PERRLA  Wiggles fingers: Left   No  Right No  Hand grasp:   Left: none.   Right  none  Wiggles toes: Left   No Right   No    Plantar flexion:  Left  none Right   none    CONSTITUTIONAL: No acute distress, lying in hospital bed.    CARDIOVASCULAR: Monitor: Afib.  S1 S2.  Irregular rate, irregular rhythm.  No murmur/gallop/rub.  PULMONARY: Bilateral clear breath sounds   No rhonchi/rales/wheezes, no use of accessory muscles.  O2:  10 L nc.     RENAL: Indwelling urinary catheter  Urine color yellow.     Fluid balance previous 24 hours:  - 2.5 L  .    ABDOMEN: Soft, nontender, nondistended, nontympanic, normal bowel sounds. Diet:  NPO.  NGT.   Tube feedings:  Standard with fiber at 40 ml per hour.  No reported vomiting.  Last BM:  PTA  MUSCULOSKELETAL:  Withdraws to pain in all extremities.    SKIN/EXTREMITIES: No rashes/ecchymosis, no edema/clubbing, warm/dry, good capillary refill.      LINES:   Peripheral  Right IJ TLC. Day 3  Left radial arterial line.  Day 5.  good curve.      LABS:      Recent Labs     02/27/24  0401 02/28/24  0347 02/29/24  0516   WBC 14.6* 13.5* 17.2*   HGB 9.8* 9.5* 11.6   HCT 33.4* 32.3* 38.7   MCV 77.3* 76.9* 76.2*    325 417       Recent Labs     02/27/24  0401 02/27/24  0834 02/27/24  1727 02/28/24  0347 02/28/24  1136 02/28/24  1441 02/28/24  2150 02/29/24  0257     --    < > 141   < > 138 140 138   K 3.5  --   --  3.6  --   --   --   --    CO2 22  --   --  24  --   --   --   --    PHOS  --  2.2*  --  1.7*  --   --   --   --    BUN 25*  --   --  18  --   --   --   --    CREATININE 0.7  --   --  0.6  --   --   --   --     < > = values in this interval not displayed.       Recent Labs     02/26/24  1156 02/27/24  0401 02/28/24  0347   PROT  --  5.6* 6.0*   INR 1.5  --   --      Diagnostic imaging:  Reviewed.       ASSESSMENT/PLAN:       Principal Problem:    Acute cerebrovascular accident (CVA) due to ischemia (HCC)  Active Problems:    Atrial fibrillation with RVR (HCC)    Hypertrophic cardiomyopathy (HCC)    History of bleeding disorder    Primary hypertension    Cerebrovascular accident (CVA)

## 2024-02-29 NOTE — PROGRESS NOTES
Subjective:      Sheila Alanis post op follow up , thrombectomy by  on 2/25-2/26 overnight    Patient is clinically stable     Review of Systems  Pertinent items are noted in HPI.      Objective:      BP (!) 138/102   Pulse (!) 123   Temp 99.9 °F (37.7 °C) (Axillary)   Resp 15   Ht 1.727 m (5' 8\")   Wt 113.5 kg (250 lb 3.6 oz)   SpO2 100%   BMI 38.05 kg/m²     NIH Stroke Scale/Score at time of initial evaluation:    1A: Level of Consciousness 2 - not alert, requires repeated stimulation to attend, or is obtunded and requires strong or painful stimulation to make movements (not stereotyped)    1B: Ask Month and Age 2 - answers neither question correctly   1C: Tell Patient To Open and Close Eyes, then Hand  Squeeze 2 - performs neither task correctly   2: Test Horizontal Extraocular Movements 2 - forced deviation, or total gaze paresis not overcome by oculocephalic maneuver   3: Test Visual Fields 2 - complete hemianopia   4: Test Facial Palsy 2 - partial paralysis (total or near total paralysis of the lower face)   5A: Test Left Arm Motor Drift 0 - no drift, limb holds 90 (or 45) degrees for full 10 seconds   5B: Test Right Arm Motor Drift 4 - no movement   6A: Test Left Leg Motor Drift 2 - some effort against gravity; leg falls to bed by 5 seconds but has some effort against gravity   6B: Test Right Leg Motor Drift 4 - no movement   7: Test Limb Ataxia (FNF/Heel-Shin) 0 - absent   8: Test Sensation 2 - severe to total sensory loss; patient is not aware of being touched in face, arm, leg   9: Test Language/Aphasia 3 - mute, global aphasia; no usable speech or auditory comprehension   10: Test Dysarthria 2 - severe; patient speech is so slurred as to be unintelligible in the absence of or our of proportion to any dysphagia, or is mute/anarthric    11: Test Extinction/Inattention 2   Total 27              Lab Review  Lab Results   Component Value Date/Time    CHOL 163 02/26/2024 04:32 AM

## 2024-02-29 NOTE — PROGRESS NOTES
Physical Therapy  Physical Therapy Attempt    Name: Sheila Alanis  : 1975  MRN: [id number]      Date of Service: 2024  Chart reviewed.  Pt is not appropriate for skilled PT at this time.  Will discontinue PT order.  Please re-consult when pt can actively participate in skilled interventions.     Carlos Herrera, PT, DPT  [id number]

## 2024-02-29 NOTE — PROGRESS NOTES
Called mother and was speaking with several family members. One family was yelling to give the pt something. Explained pt's code status and comfort care. Family agreeable to changing code status to DNRCC and starting comfort medications. Spoke with NP. JW going to the bedside as there is now some family at the bedside.     Electronically signed by Ann-Marie Campbell RN on 2/29/2024 at 3:38 PM  [phone]

## 2024-02-29 NOTE — PROGRESS NOTES
Subjective:      Sheila Alanis post op follow up , thrombectomy by  on 2/25-2/26 overnight    Patient is not progressing as expected  Cerebral edema having an negative effect on her recovery .    Review of Systems  Pertinent items are noted in HPI.      Objective:      BP (!) 156/118   Pulse (!) 127   Temp (!) 101.1 °F (38.4 °C) (Axillary)   Resp 21   Ht 1.727 m (5' 8\")   Wt 113.5 kg (250 lb 3.6 oz)   SpO2 97%   BMI 38.05 kg/m²     NIH Stroke Scale/Score at time of initial evaluation:    1A: Level of Consciousness 3   1B: Ask Month and Age 2 - answers neither question correctly   1C: Tell Patient To Open and Close Eyes, then Hand  Squeeze 2 - performs neither task correctly   2: Test Horizontal Extraocular Movements 2 - forced deviation, or total gaze paresis not overcome by oculocephalic maneuver   3: Test Visual Fields 2 - complete hemianopia   4: Test Facial Palsy 2 - partial paralysis (total or near total paralysis of the lower face)   5A: Test Left Arm Motor Drift 3 - no effort against gravity, limb falls   5B: Test Right Arm Motor Drift 4 - no movement   6A: Test Left Leg Motor Drift 3 - no effort against gravity; leg falls to bed immediately   6B: Test Right Leg Motor Drift 4 - no movement   7: Test Limb Ataxia (FNF/Heel-Shin) 0 - absent   8: Test Sensation 2 - severe to total sensory loss; patient is not aware of being touched in face, arm, leg   9: Test Language/Aphasia 3 - mute, global aphasia; no usable speech or auditory comprehension   10: Test Dysarthria 2 - severe; patient speech is so slurred as to be unintelligible in the absence of or our of proportion to any dysphagia, or is mute/anarthric    11: Test Extinction/Inattention 2   Total 31              Lab Review  Lab Results   Component Value Date/Time    CHOL 163 02/26/2024 04:32 AM    TRIG 118 02/26/2024 04:32 AM    HDL 25 02/26/2024 04:32 AM          Imaging Review      CT head 2/27  Significant swelling, mild

## 2024-02-29 NOTE — PROGRESS NOTES
[facility] Hospitalist Progress Note    Synopsis: The patient is a 48-year-old female with a past medical history of hokum, Factor VIII deficiency, atrial fibrillation, DVT and TIAs who presented to [facility] for right-sided weakness.  Workup showed a left MCA stroke.  Patient was given TNK.  She was subsequently transferred to [facility] main campus.  Neuroendovascular surgery performed a thrombectomy.  Patient had a repeat CT of the head which showed a left putamen hemorrhage.  Of note patient also has A-fib RVR.  Patient is currently under ICU care.  She is on a Cardene drip.  Cardiology has also been consulted.    Subjective:  Patient is being followed for Atrial fibrillation with RVR ([facility]) [I48.91]  Cerebrovascular accident (CVA), unspecified mechanism (HCC) [I63.9]  Acute cerebrovascular accident (CVA) due to ischemia (HCC) [I63.9]     Seen earlier  somnolent      ROS: denies fever, chills, cp, sob, n/v, HA unless stated above.      cefTRIAXone (ROCEPHIN) IV  2,000 mg IntraVENous Q24H    sennosides  5 mL Per NG tube Nightly    famotidine  20 mg Per NG tube BID    hydrALAZINE  75 mg Per NG tube 3 times per day    carvedilol  25 mg Per NG tube BID    atorvastatin  40 mg Per NG tube Nightly    polyethylene glycol  17 g Per NG tube Daily    levETIRAcetam  500 mg IntraVENous Q12H    sodium chloride flush  5-40 mL IntraVENous BID     glycopyrrolate, 0.2 mg, Q4H PRN  LORazepam, 1 mg, Q6H PRN  HYDROmorphone, 0.25 mg, Q2H PRN   Or  HYDROmorphone, 0.5 mg, Q2H PRN  acetaminophen, 650 mg, Q4H PRN  hydrALAZINE, 10 mg, Q30 Min PRN  labetalol, 10 mg, Q1H PRN  ondansetron, 4 mg, Q6H PRN  sodium chloride flush, 5-40 mL, PRN         Objective:    BP (!) 176/96   Pulse (!) 131   Temp (!) 101 °F (38.3 °C) (Axillary)   Resp (!) 31   Ht 1.727 m (5' 8\")   Wt 113.5 kg (250 lb 3.6 oz)   SpO2 (!) 84%   BMI 38.05 kg/m²     General Appearance: Lethargic.  Opens eyes to pain  Skin: warm and dry  Head:

## 2024-03-01 VITALS
HEIGHT: 68 IN | DIASTOLIC BLOOD PRESSURE: 110 MMHG | TEMPERATURE: 102.9 F | RESPIRATION RATE: 8 BRPM | BODY MASS INDEX: 37.92 KG/M2 | HEART RATE: 108 BPM | OXYGEN SATURATION: 85 % | WEIGHT: 250.22 LBS | SYSTOLIC BLOOD PRESSURE: 130 MMHG

## 2024-03-01 LAB
F5 P.R506Q BLD/T QL: NEGATIVE
MICROORGANISM SPEC CULT: NORMAL
MICROORGANISM/AGENT SPEC: NORMAL
SPECIMEN DESCRIPTION: NORMAL
SPECIMEN SOURCE: NORMAL

## 2024-03-01 PROCEDURE — 6360000002 HC RX W HCPCS: Performed by: NURSE PRACTITIONER

## 2024-03-01 RX ADMIN — GLYCOPYRROLATE 0.2 MG: 0.2 INJECTION INTRAMUSCULAR; INTRAVENOUS at 02:06

## 2024-03-01 RX ADMIN — HYDROMORPHONE HYDROCHLORIDE 0.5 MG: 1 INJECTION, SOLUTION INTRAMUSCULAR; INTRAVENOUS; SUBCUTANEOUS at 08:51

## 2024-03-01 RX ADMIN — GLYCOPYRROLATE 0.2 MG: 0.2 INJECTION INTRAMUSCULAR; INTRAVENOUS at 08:50

## 2024-03-01 RX ADMIN — HYDROMORPHONE HYDROCHLORIDE 0.5 MG: 1 INJECTION, SOLUTION INTRAMUSCULAR; INTRAVENOUS; SUBCUTANEOUS at 02:06

## 2024-03-01 RX ADMIN — LORAZEPAM 1 MG: 2 INJECTION INTRAMUSCULAR; INTRAVENOUS at 04:25

## 2024-03-01 RX ADMIN — HYDROMORPHONE HYDROCHLORIDE 0.5 MG: 1 INJECTION, SOLUTION INTRAMUSCULAR; INTRAVENOUS; SUBCUTANEOUS at 04:25

## 2024-03-01 RX ADMIN — LORAZEPAM 1 MG: 2 INJECTION INTRAMUSCULAR; INTRAVENOUS at 02:05

## 2024-03-01 NOTE — FLOWSHEET NOTE
Support services center notified that patient is ready to transfer; transfer completed in Epic. Nurse Collado for room 5408 also updated to patient status and pending arrival.

## 2024-03-01 NOTE — PROGRESS NOTES
[facility] Hospitalist Progress Note    Synopsis: The patient is a 48-year-old female with a past medical history of hokum, Factor VIII deficiency, atrial fibrillation, DVT and TIAs who presented to [facility] for right-sided weakness.  Workup showed a left MCA stroke.  Patient was given TNK.  She was subsequently transferred to [facility] main campus.  Neuroendovascular surgery performed a thrombectomy.  Patient had a repeat CT of the head which showed a left putamen hemorrhage.  Of note patient also has A-fib RVR.  Patient is currently under ICU care.  She is on a Cardene drip.  Cardiology has also been consulted.    Subjective:  Patient is being followed for Atrial fibrillation with RVR (formerly [facility]) [I48.91]  Cerebrovascular accident (CVA), unspecified mechanism (HCC) [I63.9]  Acute cerebrovascular accident (CVA) due to ischemia (HCC) [I63.9]     Patient seen earlier  Family members at bedside  Decision has been made to make patient hospice      ROS: denies fever, chills, cp, sob, n/v, HA unless stated above.      sodium chloride flush  5-40 mL IntraVENous BID     glycopyrrolate, 0.2 mg, Q4H PRN  HYDROmorphone, 0.25 mg, Q30 Min PRN   Or  HYDROmorphone, 0.5 mg, Q30 Min PRN  LORazepam, 1 mg, Q30 Min PRN  acetaminophen, 650 mg, Q4H PRN  hydrALAZINE, 10 mg, Q30 Min PRN  ondansetron, 4 mg, Q6H PRN  sodium chloride flush, 5-40 mL, PRN         Objective:    BP (!) 130/110   Pulse (!) 108   Temp (!) 102.9 °F (39.4 °C) (Axillary)   Resp (!) 8   Ht 1.727 m (5' 8\")   Wt 113.5 kg (250 lb 3.6 oz)   SpO2 (!) 85%   BMI 38.05 kg/m²     General Appearance: Lethargic.  Opens eyes to pain  Skin: warm and dry  Head: normocephalic and atraumatic  Eyes: pupils equal, round, and reactive to light, extraocular eye movements intact, conjunctivae normal  Neck: neck supple and non tender without mass   Pulmonary/Chest: clear to auscultation bilaterally- no wheezes, rales or rhonchi, normal air movement, no  basal ganglia on the left has   decreased in density. No new intracranial hemorrhage.   3. No evidence of transtentorial or para falcine herniation.         XR CHEST ABDOMEN NG PLACEMENT   Final Result   Small hiatal hernia peers to be present.  Enteric tube with distal tip   projecting over the stomach bubble in the central upper abdomen.         XR CHEST PORTABLE   Final Result   1. No acute cardiopulmonary process.   2. Improvement in the appearance of the right middle lobe compared to the   prior CT chest  view.         CT HEAD WO CONTRAST   Final Result   Large evolving vascular insult of the left cerebral hemisphere with   associated cerebral edema and sulcal effacement. No significant change in   size of the parenchymal hematoma. Trace rightward midline shift, not   significantly changed.         CT HEAD WO CONTRAST   Final Result   Addendum (preliminary) 1 of 1   ADDENDUM:   I have discussed the results with Dr. Avalos via telephone.         Final   1. 10 mm parenchymal hemorrhage left putamen.   2. Evolving left cerebral encephalomalacia.      RECOMMENDATIONS:   Careful clinical correlation and follow up recommended.            IR MECHANICAL ART THROMBECTOMY INTRACRANIAL    (Results Pending)   IR VNICE CATH PLACE VERTEBRAL ART LEFT W ANGIO    (Results Pending)   IR ARCH UNI CAR CERV CEREBRAL    (Results Pending)         Assessment and plan    Left MCA occlusion status post TNK and status post thrombectomy -neuroendovascular surgery following patient, discussed with neurointerventionist.  Clinical prognosis appears to be poor.  Patient on 3% saline currently.Palliative care if patient does not improve .apparently mother is next of kin. MRI worse. Mid line shift present.  Hospice care currently   left putamen hemorrhage -patient advised to be off any blood thinners or anticoagulation.  Patient is on Keppra 500 mg twice a day for seizure prophylaxis neurosurgery consult  A-fib RVR -on Cardizem drip.

## 2024-03-01 NOTE — PROGRESS NOTES
Visit made to the bedside. Pt is agonal breathing with periods of apnea approximately 8-10 sec long. Pt is not stable for transport. [facility] will continue to follow. Nursing aware.    Electronically signed by Ann-Marie Campbell RN on 3/1/2024 at 9:48 AM  [phone]

## 2024-03-01 NOTE — PLAN OF CARE
Problem: Chronic Conditions and Co-morbidities  Goal: Patient's chronic conditions and co-morbidity symptoms are monitored and maintained or improved  Outcome: Not Progressing     Problem: Nutrition Deficit:  Goal: Optimize nutritional status  Outcome: Not Progressing     Problem: Neurosensory - Adult  Goal: Achieves stable or improved neurological status  Outcome: Not Progressing  Goal: Achieves maximal functionality and self care  Outcome: Not Progressing     Problem: Discharge Planning  Goal: Discharge to home or other facility with appropriate resources  Outcome: Progressing     Problem: Pain  Goal: Verbalizes/displays adequate comfort level or baseline comfort level  Outcome: Progressing     Problem: Safety - Adult  Goal: Free from fall injury  Outcome: Progressing     Problem: Skin/Tissue Integrity  Goal: Absence of new skin breakdown  Outcome: Progressing     Problem: Neurosensory - Adult  Goal: Absence of seizures  Outcome: Progressing  Goal: Remains free of injury related to seizures activity  Outcome: Progressing     Problem: Risk for Elopement  Goal: Patient will not exit the unit/facility without proper excort  Outcome: Completed     Problem: Safety - Medical Restraint  Goal: Remains free of injury from restraints (Restraint for Interference with Medical Device)  2/29/2024 1136 by Kiersten Byrne RN  Outcome: Completed  Flowsheets (Taken 2/29/2024 1013)  Remains free of injury from restraints (restraint for interference with medical device):   Determine that other, less restrictive measures have been tried or would not be effective before applying the restraint   Evaluate the patient's condition at the time of restraint application   Inform patient/family regarding the reason for restraint   Every 2 hours: Monitor safety, psychosocial status, comfort, nutrition and hydration     Problem: Chronic Conditions and Co-morbidities  Goal: Patient's chronic conditions and co-morbidity symptoms are monitored and  maintained or improved  Outcome: Not Progressing     Problem: Nutrition Deficit:  Goal: Optimize nutritional status  Outcome: Not Progressing     Problem: Neurosensory - Adult  Goal: Achieves stable or improved neurological status  Outcome: Not Progressing  Goal: Achieves maximal functionality and self care  Outcome: Not Progressing

## 2024-03-02 LAB
MICROORGANISM SPEC CULT: NORMAL
MICROORGANISM SPEC CULT: NORMAL
SERVICE CMNT-IMP: NORMAL
SERVICE CMNT-IMP: NORMAL
SPECIMEN DESCRIPTION: NORMAL
SPECIMEN DESCRIPTION: NORMAL

## 2024-03-18 NOTE — PROGRESS NOTES
Pap today Not able to do the EMB  Will schedule [facility]   See back here pre-op PT. walk in with mother for 2nd round of rabies vaccine.

## 2024-11-12 NOTE — OR NURSING
Across the ANGE  
Across the Left ANGE  
Across the MCA  
Across the MCA   
Crossed the ICA occulusion   
Sheath pulled and 8F Angioseal used to close arterial puncture. Puncture site cleansed and dry dressing applied. No bleeding, swelling or complications noted, no change in pulses.   
across the ICA; across the MCA.   
across the MCA  
no